# Patient Record
Sex: MALE | Race: WHITE | NOT HISPANIC OR LATINO | URBAN - METROPOLITAN AREA
[De-identification: names, ages, dates, MRNs, and addresses within clinical notes are randomized per-mention and may not be internally consistent; named-entity substitution may affect disease eponyms.]

---

## 2018-01-10 ENCOUNTER — EMERGENCY (EMERGENCY)
Facility: HOSPITAL | Age: 78
LOS: 1 days | Discharge: HOME | End: 2018-01-10
Attending: EMERGENCY MEDICINE | Admitting: EMERGENCY MEDICINE
Payer: MEDICARE

## 2018-01-10 VITALS
SYSTOLIC BLOOD PRESSURE: 153 MMHG | RESPIRATION RATE: 20 BRPM | OXYGEN SATURATION: 97 % | DIASTOLIC BLOOD PRESSURE: 91 MMHG | HEART RATE: 70 BPM

## 2018-01-10 VITALS
RESPIRATION RATE: 20 BRPM | OXYGEN SATURATION: 99 % | WEIGHT: 179.9 LBS | HEART RATE: 87 BPM | DIASTOLIC BLOOD PRESSURE: 99 MMHG | SYSTOLIC BLOOD PRESSURE: 186 MMHG | HEIGHT: 71 IN | TEMPERATURE: 98 F

## 2018-01-10 LAB
ALBUMIN SERPL ELPH-MCNC: 3.7 G/DL — SIGNIFICANT CHANGE UP (ref 3.3–5)
ALP SERPL-CCNC: 125 U/L — HIGH (ref 30–120)
ALT FLD-CCNC: 26 U/L DA — SIGNIFICANT CHANGE UP (ref 10–60)
ANION GAP SERPL CALC-SCNC: 6 MMOL/L — SIGNIFICANT CHANGE UP (ref 5–17)
AST SERPL-CCNC: 26 U/L — SIGNIFICANT CHANGE UP (ref 10–40)
BASOPHILS # BLD AUTO: 0.1 K/UL — SIGNIFICANT CHANGE UP (ref 0–0.2)
BASOPHILS NFR BLD AUTO: 1.1 % — SIGNIFICANT CHANGE UP (ref 0–2)
BILIRUB SERPL-MCNC: 0.4 MG/DL — SIGNIFICANT CHANGE UP (ref 0.2–1.2)
BUN SERPL-MCNC: 16 MG/DL — SIGNIFICANT CHANGE UP (ref 7–23)
CALCIUM SERPL-MCNC: 10.1 MG/DL — SIGNIFICANT CHANGE UP (ref 8.4–10.5)
CHLORIDE SERPL-SCNC: 105 MMOL/L — SIGNIFICANT CHANGE UP (ref 96–108)
CK MB CFR SERPL CALC: 3.1 NG/ML — SIGNIFICANT CHANGE UP (ref 0–3.6)
CK SERPL-CCNC: 132 U/L — SIGNIFICANT CHANGE UP (ref 39–308)
CO2 SERPL-SCNC: 29 MMOL/L — SIGNIFICANT CHANGE UP (ref 22–31)
CREAT SERPL-MCNC: 1.14 MG/DL — SIGNIFICANT CHANGE UP (ref 0.5–1.3)
EOSINOPHIL # BLD AUTO: 0.1 K/UL — SIGNIFICANT CHANGE UP (ref 0–0.5)
EOSINOPHIL NFR BLD AUTO: 1.2 % — SIGNIFICANT CHANGE UP (ref 0–6)
GLUCOSE SERPL-MCNC: 125 MG/DL — HIGH (ref 70–99)
HCT VFR BLD CALC: 42.4 % — SIGNIFICANT CHANGE UP (ref 39–50)
HGB BLD-MCNC: 13.7 G/DL — SIGNIFICANT CHANGE UP (ref 13–17)
LYMPHOCYTES # BLD AUTO: 1.8 K/UL — SIGNIFICANT CHANGE UP (ref 1–3.3)
LYMPHOCYTES # BLD AUTO: 20.3 % — SIGNIFICANT CHANGE UP (ref 13–44)
MCHC RBC-ENTMCNC: 24.6 PG — LOW (ref 27–34)
MCHC RBC-ENTMCNC: 32.4 GM/DL — SIGNIFICANT CHANGE UP (ref 32–36)
MCV RBC AUTO: 76 FL — LOW (ref 80–100)
MONOCYTES # BLD AUTO: 0.6 K/UL — SIGNIFICANT CHANGE UP (ref 0–0.9)
MONOCYTES NFR BLD AUTO: 6.4 % — SIGNIFICANT CHANGE UP (ref 2–14)
NEUTROPHILS # BLD AUTO: 6.4 K/UL — SIGNIFICANT CHANGE UP (ref 1.8–7.4)
NEUTROPHILS NFR BLD AUTO: 70.9 % — SIGNIFICANT CHANGE UP (ref 43–77)
NT-PROBNP SERPL-SCNC: 950 PG/ML — HIGH (ref 0–450)
PLATELET # BLD AUTO: 240 K/UL — SIGNIFICANT CHANGE UP (ref 150–400)
POTASSIUM SERPL-MCNC: 4 MMOL/L — SIGNIFICANT CHANGE UP (ref 3.5–5.3)
POTASSIUM SERPL-SCNC: 4 MMOL/L — SIGNIFICANT CHANGE UP (ref 3.5–5.3)
PROT SERPL-MCNC: 7.5 G/DL — SIGNIFICANT CHANGE UP (ref 6–8.3)
RBC # BLD: 5.58 M/UL — SIGNIFICANT CHANGE UP (ref 4.2–5.8)
RBC # FLD: 14.3 % — SIGNIFICANT CHANGE UP (ref 10.3–14.5)
SODIUM SERPL-SCNC: 140 MMOL/L — SIGNIFICANT CHANGE UP (ref 135–145)
TROPONIN I SERPL-MCNC: 0.03 NG/ML — SIGNIFICANT CHANGE UP (ref 0.02–0.06)
WBC # BLD: 9 K/UL — SIGNIFICANT CHANGE UP (ref 3.8–10.5)
WBC # FLD AUTO: 9 K/UL — SIGNIFICANT CHANGE UP (ref 3.8–10.5)

## 2018-01-10 PROCEDURE — 82553 CREATINE MB FRACTION: CPT

## 2018-01-10 PROCEDURE — 84484 ASSAY OF TROPONIN QUANT: CPT

## 2018-01-10 PROCEDURE — 96374 THER/PROPH/DIAG INJ IV PUSH: CPT

## 2018-01-10 PROCEDURE — 82550 ASSAY OF CK (CPK): CPT

## 2018-01-10 PROCEDURE — 99284 EMERGENCY DEPT VISIT MOD MDM: CPT

## 2018-01-10 PROCEDURE — 71046 X-RAY EXAM CHEST 2 VIEWS: CPT | Mod: 26

## 2018-01-10 PROCEDURE — 71046 X-RAY EXAM CHEST 2 VIEWS: CPT

## 2018-01-10 PROCEDURE — 93010 ELECTROCARDIOGRAM REPORT: CPT

## 2018-01-10 PROCEDURE — 93005 ELECTROCARDIOGRAM TRACING: CPT

## 2018-01-10 PROCEDURE — 83880 ASSAY OF NATRIURETIC PEPTIDE: CPT

## 2018-01-10 PROCEDURE — 99284 EMERGENCY DEPT VISIT MOD MDM: CPT | Mod: 25

## 2018-01-10 PROCEDURE — 80053 COMPREHEN METABOLIC PANEL: CPT

## 2018-01-10 PROCEDURE — 85027 COMPLETE CBC AUTOMATED: CPT

## 2018-01-10 RX ORDER — LABETALOL HCL 100 MG
10 TABLET ORAL ONCE
Qty: 0 | Refills: 0 | Status: COMPLETED | OUTPATIENT
Start: 2018-01-10 | End: 2018-01-10

## 2018-01-10 RX ADMIN — Medication 10 MILLIGRAM(S): at 19:02

## 2018-01-10 NOTE — ED PROVIDER NOTE - OBJECTIVE STATEMENT
76 y/o M with hx of HTN, HLD presents with c/o shortness of breath x 3 days. Pt states that his symptoms are intermittent and feels that way when he is home and sees his  wife's pictures on the walls. Pt states that he feels better now in the ED. Pt states that he went to [facility] MD today, had cxr and given lisinopril 40mg daily for high BP and sent to ED. Pt states that he filled the rx and took one dose today. Pt states that he lives alone at home. Denies SI/HI, cough, fever, chills, n/v, CP, recent travel, sick contacts or other symptoms. 76 y/o M with hx of HTN, HLD presents with c/o shortness of breath x 3 days. Pt states that his symptoms are intermittent and feels that way when he is home and sees his  wife's pictures on the walls. Pt states that he feels better now in the ED. Pt states that he went to [facility] MD today, had cxr and given lisinopril 40mg daily for high BP and sent to ED. Pt states that he filled the rx and took one dose today. Pt states that he lives alone at home. Denies SI/HI, cough, fever, chills, n/v, CP, recent travel, headache, dizziness, vision changes, numbness, tingling, weakness, sick contacts or other symptoms.

## 2018-01-10 NOTE — ED ADULT NURSE REASSESSMENT NOTE - NS ED NURSE REASSESS COMMENT FT1
labetalol 10mg administered secondary to elevated b/p. Placed on continuous cardiac monitoring. NSR 70's noted. See VS
B/P DECREASED AS PER DR CABRERA PT MAY LEAVE WITH D/C INSTRUCTIONS
PT STATES I JUST REALLY MISS MY WIFE SHE TOOK GOOD CARE OF ME IF ISAID I WAS HUNGRY EVEN IF IT WAS TWO AM SHE WOULD GET UP AND COOK ME BALL AND EGGS/ PT GIVEN REASSURANCE/ REEDUCATED CONCERNING MEDICATION COMPLIANCE /PT STATES I WILL GO HOME AND TAKE MY MEDICINE FROM NOW ON I KNOW THAT'S WHAT SHE WOULD OF WANTED ME TO DO

## 2018-01-10 NOTE — ED PROVIDER NOTE - MEDICAL DECISION MAKING DETAILS
76 yo m with SOB x 3 days, intermittent and only when seeing his  wife's pictures at home, no si/hi; lives alone, ex-smoker, lungs cta b, pt took his htn med today; will get labs, one set cards, ekg, cxr, , re-assess

## 2018-01-10 NOTE — ED PROVIDER NOTE - PROGRESS NOTE DETAILS
Pt seen by Vazquez, pt given list of private agencies for HHA, but pt refused and pt does not meet medicare qualifications. Pt examined by ED attending, Dr. Mims who agreed with disposition and plan. Pt seen by Vazquez pt given list of private agencies for HHA, but pt refused and pt does not meet medicare qualifications.  Pt has psychiatrist that he sees in [place]. Will have give psych clinic information if another psychiatrist preferred. Pt seen by , Vazquez pt given list of private agencies for HHA, but pt refused and pt does not meet medicare qualifications.  Pt has psychiatrist that he sees in [place]. Will have give psych clinic information if another psychiatrist preferred. Pt did not want to talk to psychiatrist in ED. Pt re-assessed, feeling better. will d/c home and f/u pmd and psychiatry clinic. Pt with elevated BP in ED, pt is noncompliant with his HTN meds and does not see his PMD regularly. Pt took lisinopril 40mg this am. Will give one dose of IV labetalol in ED since /115 and d/c home, f/u pmd , educated on medication compliance and risk of untreated HTN explained to pt - pt understood and agreed to f/u with pmd and be compliant with meds. Pt seen by , Vazquez, pt given list of private agencies for HHA, but pt refused and pt does not meet medicare qualifications.  Pt has psychiatrist that he sees in [place]. Will have give psych clinic information if another psychiatrist preferred.   I spoke to psychiatrist, Dr. Moser who advised pt does not need any acute psych intervention at this time if no complaints of SI/HI. Pt can f/u with his outpt psychiatrist. Pt also given list of other psych facilities nearby. Pt with elevated BP in ED, pt is noncompliant with his HTN meds and does not see his PMD regularly. Pt took lisinopril 40mg this am. Will give one dose of IV labetalol in ED since /115, monitor and d/c home, f/u pmd , educated on medication compliance and risk of untreated HTN explained to pt - pt understood and agreed to f/u with pmd and be compliant with meds.

## 2018-01-10 NOTE — ED PROVIDER NOTE - CARE PLAN
Principal Discharge DX:	Shortness of breath Principal Discharge DX:	Shortness of breath  Secondary Diagnosis:	Depression Principal Discharge DX:	Shortness of breath  Secondary Diagnosis:	Depression  Secondary Diagnosis:	Hypertension

## 2018-01-10 NOTE — ED ADULT NURSE NOTE - OBJECTIVE STATEMENT
Strong peripheral pulses complains of shortness of breath x 3 days which he thinks is related to panic attacks.  pt states he has been thinking about his  wife a lot and finds himself SOB. No labored breathing or use of accessory muscle noted, O2 Saturation 99. Pt speaks clearly in complete sentences. history of htn and is adherent with his medication (lisinopril).  Plan of care discussed with MD and pt. Pending MD evaluation and orders.

## 2018-01-10 NOTE — ED PROVIDER NOTE - ATTENDING CONTRIBUTION TO CARE
Pt is a 76 yo male who presents to the ED with a cc of SOB.  PMHx of HTN, HLD, depression, anxiety.  Pt reports that he has been experiencing SOB over the last 3 days.  He reports that he experiences these symptoms when he is at home and looks at pictures of his  wife.  Pt is tearful when talking about his wife.   He reports that he has been depressed since her passing but denies SI/HI.  Pt further admits to being noncompliant with his medications.  Denies fever, chills, N/V, CP, cough, abd pain, HA, visual changes, ext numbness/weakness.  He did have his flu vaccine this year.  He was seen at urgent care earlier today and was given prescription for Lisinopril but pt wanted further work up so he came to the ED.  On exam pt resting in bed NAD, PERRL, EOMI, heart RRR, lungs CTA, abd soft NT/ND.  Will check cbc, cmp, BNP, cardiac enzymes, EKG, chest x-ray.  Social wrk consulted to provide lists of home health care agencies as pt lives alone.  Agree with above plan of care.

## 2018-11-02 PROBLEM — E78.5 HYPERLIPIDEMIA, UNSPECIFIED: Chronic | Status: ACTIVE | Noted: 2018-01-10

## 2018-11-02 PROBLEM — I10 ESSENTIAL (PRIMARY) HYPERTENSION: Chronic | Status: ACTIVE | Noted: 2018-01-10

## 2018-11-07 ENCOUNTER — APPOINTMENT (OUTPATIENT)
Dept: RHEUMATOLOGY | Facility: CLINIC | Age: 78
End: 2018-11-07

## 2018-11-07 ENCOUNTER — EMERGENCY (EMERGENCY)
Facility: HOSPITAL | Age: 78
LOS: 1 days | Discharge: HOME | End: 2018-11-07
Attending: EMERGENCY MEDICINE | Admitting: EMERGENCY MEDICINE
Payer: MEDICARE

## 2018-11-07 VITALS
DIASTOLIC BLOOD PRESSURE: 90 MMHG | SYSTOLIC BLOOD PRESSURE: 183 MMHG | TEMPERATURE: 98 F | OXYGEN SATURATION: 98 % | HEART RATE: 65 BPM | RESPIRATION RATE: 16 BRPM

## 2018-11-07 VITALS
DIASTOLIC BLOOD PRESSURE: 124 MMHG | WEIGHT: 175.05 LBS | SYSTOLIC BLOOD PRESSURE: 181 MMHG | TEMPERATURE: 98 F | RESPIRATION RATE: 16 BRPM | HEIGHT: 70 IN | OXYGEN SATURATION: 98 % | HEART RATE: 91 BPM

## 2018-11-07 DIAGNOSIS — F43.21 ADJUSTMENT DISORDER WITH DEPRESSED MOOD: ICD-10-CM

## 2018-11-07 LAB
ALBUMIN SERPL ELPH-MCNC: 3.5 G/DL — SIGNIFICANT CHANGE UP (ref 3.3–5)
ALP SERPL-CCNC: 94 U/L — SIGNIFICANT CHANGE UP (ref 30–120)
ALT FLD-CCNC: 25 U/L DA — SIGNIFICANT CHANGE UP (ref 10–60)
ANION GAP SERPL CALC-SCNC: 8 MMOL/L — SIGNIFICANT CHANGE UP (ref 5–17)
APTT BLD: 25.7 SEC — LOW (ref 27.5–36.3)
AST SERPL-CCNC: 18 U/L — SIGNIFICANT CHANGE UP (ref 10–40)
BASOPHILS # BLD AUTO: 0.04 K/UL — SIGNIFICANT CHANGE UP (ref 0–0.2)
BASOPHILS NFR BLD AUTO: 0.5 % — SIGNIFICANT CHANGE UP (ref 0–2)
BILIRUB SERPL-MCNC: 0.5 MG/DL — SIGNIFICANT CHANGE UP (ref 0.2–1.2)
BUN SERPL-MCNC: 17 MG/DL — SIGNIFICANT CHANGE UP (ref 7–23)
CALCIUM SERPL-MCNC: 9.6 MG/DL — SIGNIFICANT CHANGE UP (ref 8.4–10.5)
CHLORIDE SERPL-SCNC: 105 MMOL/L — SIGNIFICANT CHANGE UP (ref 96–108)
CO2 SERPL-SCNC: 26 MMOL/L — SIGNIFICANT CHANGE UP (ref 22–31)
CREAT SERPL-MCNC: 1.22 MG/DL — SIGNIFICANT CHANGE UP (ref 0.5–1.3)
D DIMER BLD IA.RAPID-MCNC: 157 NG/ML DDU — SIGNIFICANT CHANGE UP
EOSINOPHIL # BLD AUTO: 0.09 K/UL — SIGNIFICANT CHANGE UP (ref 0–0.5)
EOSINOPHIL NFR BLD AUTO: 1 % — SIGNIFICANT CHANGE UP (ref 0–6)
GLUCOSE SERPL-MCNC: 147 MG/DL — HIGH (ref 70–99)
HCT VFR BLD CALC: 37.8 % — LOW (ref 39–50)
HGB BLD-MCNC: 12.2 G/DL — LOW (ref 13–17)
IMM GRANULOCYTES NFR BLD AUTO: 0.3 % — SIGNIFICANT CHANGE UP (ref 0–1.5)
INR BLD: 1.04 RATIO — SIGNIFICANT CHANGE UP (ref 0.88–1.16)
LYMPHOCYTES # BLD AUTO: 1.45 K/UL — SIGNIFICANT CHANGE UP (ref 1–3.3)
LYMPHOCYTES # BLD AUTO: 16.5 % — SIGNIFICANT CHANGE UP (ref 13–44)
MCHC RBC-ENTMCNC: 24.7 PG — LOW (ref 27–34)
MCHC RBC-ENTMCNC: 32.3 GM/DL — SIGNIFICANT CHANGE UP (ref 32–36)
MCV RBC AUTO: 76.7 FL — LOW (ref 80–100)
MONOCYTES # BLD AUTO: 0.46 K/UL — SIGNIFICANT CHANGE UP (ref 0–0.9)
MONOCYTES NFR BLD AUTO: 5.2 % — SIGNIFICANT CHANGE UP (ref 2–14)
NEUTROPHILS # BLD AUTO: 6.72 K/UL — SIGNIFICANT CHANGE UP (ref 1.8–7.4)
NEUTROPHILS NFR BLD AUTO: 76.5 % — SIGNIFICANT CHANGE UP (ref 43–77)
NT-PROBNP SERPL-SCNC: 260 PG/ML — SIGNIFICANT CHANGE UP (ref 0–450)
PLATELET # BLD AUTO: 205 K/UL — SIGNIFICANT CHANGE UP (ref 150–400)
POTASSIUM SERPL-MCNC: 3 MMOL/L — LOW (ref 3.5–5.3)
POTASSIUM SERPL-SCNC: 3 MMOL/L — LOW (ref 3.5–5.3)
PROT SERPL-MCNC: 6.6 G/DL — SIGNIFICANT CHANGE UP (ref 6–8.3)
PROTHROM AB SERPL-ACNC: 11.4 SEC — SIGNIFICANT CHANGE UP (ref 10–12.9)
RBC # BLD: 4.93 M/UL — SIGNIFICANT CHANGE UP (ref 4.2–5.8)
RBC # FLD: 15.9 % — HIGH (ref 10.3–14.5)
SODIUM SERPL-SCNC: 139 MMOL/L — SIGNIFICANT CHANGE UP (ref 135–145)
TROPONIN I SERPL-MCNC: 0.02 NG/ML — LOW (ref 0.02–0.06)
WBC # BLD: 8.79 K/UL — SIGNIFICANT CHANGE UP (ref 3.8–10.5)
WBC # FLD AUTO: 8.79 K/UL — SIGNIFICANT CHANGE UP (ref 3.8–10.5)

## 2018-11-07 PROCEDURE — 71046 X-RAY EXAM CHEST 2 VIEWS: CPT | Mod: 26

## 2018-11-07 PROCEDURE — 93010 ELECTROCARDIOGRAM REPORT: CPT

## 2018-11-07 PROCEDURE — 71046 X-RAY EXAM CHEST 2 VIEWS: CPT

## 2018-11-07 PROCEDURE — 80053 COMPREHEN METABOLIC PANEL: CPT

## 2018-11-07 PROCEDURE — 84484 ASSAY OF TROPONIN QUANT: CPT

## 2018-11-07 PROCEDURE — 85379 FIBRIN DEGRADATION QUANT: CPT

## 2018-11-07 PROCEDURE — 99284 EMERGENCY DEPT VISIT MOD MDM: CPT | Mod: 25

## 2018-11-07 PROCEDURE — 85730 THROMBOPLASTIN TIME PARTIAL: CPT

## 2018-11-07 PROCEDURE — 85027 COMPLETE CBC AUTOMATED: CPT

## 2018-11-07 PROCEDURE — 36415 COLL VENOUS BLD VENIPUNCTURE: CPT

## 2018-11-07 PROCEDURE — 99284 EMERGENCY DEPT VISIT MOD MDM: CPT

## 2018-11-07 PROCEDURE — 93005 ELECTROCARDIOGRAM TRACING: CPT

## 2018-11-07 PROCEDURE — 85610 PROTHROMBIN TIME: CPT

## 2018-11-07 PROCEDURE — 90792 PSYCH DIAG EVAL W/MED SRVCS: CPT | Mod: GT

## 2018-11-07 PROCEDURE — 83880 ASSAY OF NATRIURETIC PEPTIDE: CPT

## 2018-11-07 RX ORDER — ASPIRIN/CALCIUM CARB/MAGNESIUM 324 MG
1 TABLET ORAL
Qty: 0 | Refills: 0 | COMMUNITY

## 2018-11-07 RX ORDER — TIOTROPIUM BROMIDE 18 UG/1
1 CAPSULE ORAL; RESPIRATORY (INHALATION)
Qty: 0 | Refills: 0 | COMMUNITY

## 2018-11-07 RX ORDER — CHOLECALCIFEROL (VITAMIN D3) 125 MCG
1 CAPSULE ORAL
Qty: 0 | Refills: 0 | COMMUNITY

## 2018-11-07 RX ORDER — METOPROLOL TARTRATE 50 MG
50 TABLET ORAL ONCE
Qty: 0 | Refills: 0 | Status: COMPLETED | OUTPATIENT
Start: 2018-11-07 | End: 2018-11-07

## 2018-11-07 RX ADMIN — Medication 50 MILLIGRAM(S): at 14:45

## 2018-11-07 RX ADMIN — Medication 1 MILLIGRAM(S): at 14:45

## 2018-11-07 RX ADMIN — Medication 0.1 MILLIGRAM(S): at 17:57

## 2018-11-07 NOTE — ED PROVIDER NOTE - PROGRESS NOTE DETAILS
Pt examined by ED attending, Dr. Orr who agreed with disposition and plan. Spoke to psychiatrist at telepsych, discussed case, will consult pt soon.  , Vazquez also paged to speak to pt. Spoke to psychiatrist, pt is cleared for discharge, pt has appt with outpt psych next week. Spoke to son, Austin, discussed case and results, he is out of town for work, will take father for outpt psych appt next Tuesday and f/u pmd.

## 2018-11-07 NOTE — ED ADULT NURSE REASSESSMENT NOTE - NS ED NURSE REASSESS COMMENT FT1
Patient agreeable to speak to psychiatrist via tele psych.
Patient's blood pressure became elevated, patient reminiscing about his dead wife. Offered to call  of therapist patient declines. States he has spoken to them before. Priscilla Moser aware. Patient also states he is not sure if he took his blood pressure medication today. Will medicate as per orders.
Patient's blood pressure remains elevated.  Clonidine 0.1 mg. given. Resting in bed without distress.
Contacted   Ever on patient's behalf. Awaiting consult. Patient calmer at this time.

## 2018-11-07 NOTE — ED BEHAVIORAL HEALTH ASSESSMENT NOTE - SUMMARY
77 year-old  male, living alone,  2016, with recent psychiatric history grief 2016 (saw psychiatrist for 1 year), with no other prior psychiatric history, with no prior / current suicidal ideation, with no prior suicide attempt, with no prior in-patient hospitalization, no legal history, self-referred for shortness of breathe. Psychiatry consulted for depression in the setting of grief.    Patient symptoms indicative of Grief reaction with prolonged bereavement. Patient has no hopelessness or suicidal ideation. Patient has no prior suicide attempt. Patient is not manic or psychotic. Patient symptoms not indicated imminent risk for harm to self; not warranting involuntary in-patient hospitalization. Patient to follow-up with

## 2018-11-07 NOTE — ED BEHAVIORAL HEALTH ASSESSMENT NOTE - OTHER PAST PSYCHIATRIC HISTORY (INCLUDE DETAILS REGARDING ONSET, COURSE OF ILLNESS, INPATIENT/OUTPATIENT TREATMENT)
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

## 2018-11-07 NOTE — ED BEHAVIORAL HEALTH ASSESSMENT NOTE - CASE SUMMARY
Per chart review, pre-hospital/ED staff, and Son: the patient is a 76 yo, domiciled alone in a house, retired,  (about 3 years), , Male, Non-caregiver, no formal psychiatric diagnosis, no hx of hospitalizations, hx of making passive suicidal statement to the effect of “if nobody wants to be around here I guess I’ll just die,” no hx of of self injury or suicide attempts, no hx of violence, no hx of arrests, no active substance use, remote hx of alcohol use 25+years ago, no hx of DTS/withdrawal, PMH of enlarged prostate and high blood pressure, BIB self, referred by self, presenting with shortness of breath but became tearful speaking about  wife ( a few years), seeking evaluation in setting of being  a few years ago. Psychiatry consulted for depression in the setting of grief.    Patient symptoms indicative of Grief reaction with prolonged bereavement. Patient initially tearful in the ER however in a better mood later, cracking jokes about having to go to the "QQ doctor" and him needing a girlfriend. Patient has no hopelessness or suicidal ideation. Patient has no prior suicide attempt. Patient is not manic or psychotic. Pt declined voluntary hospitalization and did not meet criteria for involuntary commitment given not an imminent risk to self or others. He will follow up with dr Dickson on  at 13:15pm at the [facility] in [place]. Family was contacted, agreed to disposition did not voice safety concerns.

## 2018-11-07 NOTE — ED PROVIDER NOTE - ATTENDING CONTRIBUTION TO CARE
I, Dr Orr, have personally performed a face to face diagnostic evaluation on this patient with the PA/NP. I have reviewed the PA/NP's note and agree with the history, Physical exam and plan of care, As per history 78 y/o M with hx of HTN, HLD presents with c/o shortness of breath x 1 week. Pt states that his symptoms are intermittent and feels worse when he thinks about his  wife. Pt states that he took his medications this morning. It is non-exertional. Denies chest pain, headache, dizziness, cough, fever, body aches, n/v, vision changes, numbness, tingling, focal weakness or other symptoms. Pt denies SI/HI, drug abuse. Pt was seen for similar symptoms here few months ago.  Pt used to see a psychiatrist but not anymore and is not any psychiatric meds. States that his wife passed away 2 years ago and is still very depressed. Patient still feeling very depress about the death of wife Psych consult noted will discharge patient home fallow up with PMD.

## 2018-11-07 NOTE — ED PROVIDER NOTE - MEDICAL DECISION MAKING DETAILS
76 y/o M with hx of HTN, HLD c/o SOB x 1 week, when he thinks about his  wife, no fever/cp, no si/hi, will get labs, trop, ddimer, cxr, re-assess 78 y/o M with hx of HTN, HLD c/o SOB x 1 week, when he thinks about his  wife, no fever/cp, no si/hi, will get labs, trop, ddimer, cxr, Psych and SW consults, re-assess

## 2018-11-07 NOTE — ED BEHAVIORAL HEALTH ASSESSMENT NOTE - HPI (INCLUDE ILLNESS QUALITY, SEVERITY, DURATION, TIMING, CONTEXT, MODIFYING FACTORS, ASSOCIATED SIGNS AND SYMPTOMS)
77 year-old  male, living alone,  2016, with recent psychiatric history grief 2016 (saw psychiatrist for 1 year), with no other prior psychiatric history, with no prior / current suicidal ideation, with no prior suicide attempt, with no prior in-patient hospitalization, no legal history, self-referred for shortness of breathe. Psychiatry consulted for depression in the setting of grief.    Patient presenting euthymic, reports persistent depressed mood secondary to grief. Reports being  60 years prior wife passing away. Reports experiencing difficulty sleepin-5 hours nightly. Denies disturbances in appetite. Denies other depressive symptoms including hopelessness, poor concentration, guilt, anergia, anhedonia. Denies manic symptoms including elevated mood, increased irritability, mood lability, distractibility, grandiosity, pressured speech, increase in goal-directed activity, or decreased need for sleep. Denies psychotic symptoms including paranoia, ideas of reference, thought insertion/broadcasting, or auditory/visual/olfactory/tactile/gustatory hallucinations. Denies anxiety, excessive worrying. Denies PTSD. Patient is independent. Reports wanting a girlfriend, jokingly. Reports positive therapeutic relationships and strong social supports. Reports future orientation, with motivation to continue outpatient treatment. Engaged in safety planning. 77 year-old  male, living alone,  2016, with recent psychiatric history grief 2016 (saw psychiatrist for 1 year), with no other prior psychiatric history, with no prior / current suicidal ideation, with no prior suicide attempt, with no prior in-patient hospitalization, no legal history, self-referred for shortness of breathe. Psychiatry consulted for depression in the setting of grief.    Patient presenting euthymic, reports persistent depressed mood secondary to grief. Reports being  60 years prior wife passing away. Reports experiencing difficulty sleepin-5 hours nightly. Denies disturbances in appetite. Denies other depressive symptoms including hopelessness, poor concentration, guilt, anergia, anhedonia. Denies manic symptoms including elevated mood, increased irritability, mood lability, distractibility, grandiosity, pressured speech, increase in goal-directed activity, or decreased need for sleep. Denies psychotic symptoms including paranoia, ideas of reference, thought insertion/broadcasting, or auditory/visual/olfactory/tactile/gustatory hallucinations. Denies anxiety, excessive worrying. Denies PTSD. Patient is independent. Reports wanting a girlfriend, jokingly. Reports positive therapeutic relationships and strong social supports. Reports future orientation, with motivation to continue outpatient treatment. Engaged in safety planning.    Son provides collateral, corroborating history. Denies suicide attempt. Reports history of passive suicidal ideation but states it was attention seeking secondary to him feeling lonely since wife passing. Patient otherwise is independent. Denies safety concerns. 77 year-old  male, living alone,  2016, with recent psychiatric history grief 2016 (saw psychiatrist for 1 year), with no other prior psychiatric history, with no prior / current suicidal ideation, with no prior suicide attempt, with no prior in-patient hospitalization, no legal history, self-referred for shortness of breathe. Psychiatry consulted for depression in the setting of grief.    Patient presenting euthymic, reports persistent depressed mood secondary to grief. Reports being  60 years prior wife passing away. Reports experiencing difficulty sleepin-5 hours nightly. Denies disturbances in appetite. Denies other depressive symptoms including hopelessness, poor concentration, guilt, anergia, anhedonia. Denies manic symptoms including elevated mood, increased irritability, mood lability, distractibility, grandiosity, pressured speech, increase in goal-directed activity, or decreased need for sleep. Denies psychotic symptoms including paranoia, ideas of reference, thought insertion/broadcasting, or auditory/visual/olfactory/tactile/gustatory hallucinations. Denies anxiety, excessive worrying. Denies PTSD. Patient is independent. Reports wanting a girlfriend, jokingly. Reports positive therapeutic relationships and strong social supports. Reports future orientation, with motivation to continue outpatient treatment. Engaged in safety planning.    Son provides collateral, corroborating history. Denies suicide attempt. Reports history of passive suicidal ideation but states it was attention seeking secondary to him feeling lonely since wife passing. Patient otherwise is independent. Denies safety concerns.    Austin Darnell, [phone], Son, frequent contact, most recent contact was by phone earlier today, knowledgeable about patient hx and presenting issues, no acute safety concerns, will be taking patient to outpatient Psych appt next week and encouraging him to be in outpatient treatment. Per Son the HPI really starts a few years ago after patient’s wife . At baseline patient lives alone in a house, has 4 adult children, 2 of whom he is close with and speaks to regularly (1 lives locally and 1 lives in [place]), spends time socializing with a neighbor, and is retired. His baseline sx include feeling sad since his Wife  about 3 years ago and having difficulty falling asleep at night since that time, but no other mood/psychotic/anxiety sx, substance use, or thoughts of harming himself/others. Patient is not in treatment, and in the past saw a Psychiatrist 3 times  about 1 year ago due to this sadness and grief that he has felt since losing his Wife. Son states that about 1 year ago he took an anti-depressant briefly, but Son can’t recall the name of the medication. Son states that since patient’s Wife , patient has cried frequently, speaks about missing his Wife and has had difficulty falling asleep at night. Son reports that he has encouraged patient to sell home and either move in with one of his Son’s or move to a smaller home, patient declines and states that his Wife would not have wanted him to move out of this home that they shared. Patient has at times asked Son to move into the home with him but Son explains that he can’t, and patient has on a few occasions over the past few years said something to the effect of “if nobody wants to be around here I guess I’ll just die.” Son states that patient has never expressed any plan or intent, has no hx of self injury or suicide attempts, and states that when patient has made this statement he believes it has been in an attention seeking way to get a rise out of Son and Son has not been concerned that patient is truly having a thought about wanting to hurt himself. Patient has had no change to appetite or ADLS or caring for himself. No other mood/psychotic/anxiety sx, substance use or thoughts of harming others. Son denies safety concerns and states that he will be driving patient to an appointment he scheduled for patient with a Psychiatrist Dr. Dickson on  at 1:15 at [address] [facility].

## 2018-11-07 NOTE — ED ADULT NURSE NOTE - CHPI ED NUR SYMPTOMS NEG
no headache/no hemoptysis/no fever/no wheezing/no chills/no cough/no body aches/no diaphoresis/no chest pain

## 2018-11-07 NOTE — ED PROVIDER NOTE - OBJECTIVE STATEMENT
78 y/o M with hx of HTN, HLD presents with c/o shortness of breath x 1 week. Pt states that his symptoms are intermittent and feels worse when he thinks about his  wife. Pt states that he took his medications this morning. It is non-exertional. Denies chest pain, headache, dizziness, cough, fever, body aches, n/v, vision changes, numbness, tingling, focal weakness or other symptoms. Pt denies SI/HI, does not want to speak to a psychiatrist. 78 y/o M with hx of HTN, HLD presents with c/o shortness of breath x 1 week. Pt states that his symptoms are intermittent and feels worse when he thinks about his  wife. Pt states that he took his medications this morning. It is non-exertional. Denies chest pain, headache, dizziness, cough, fever, body aches, n/v, vision changes, numbness, tingling, focal weakness or other symptoms. Pt denies SI/HI, drug abuse. Pt was seen for similar symptoms here few months ago.  Pt used to see a psychiatrist but not anymore and is not any psychiatric meds. States that his wife passed away 2 years ago and is still very depressed.

## 2018-11-07 NOTE — ED BEHAVIORAL HEALTH ASSESSMENT NOTE - DESCRIPTION
Patient was calm and cooperative in the ED and did not exhibit any aggression. Patient did not require any PRN medications or any physical restraints.     Vital Signs Last 24 Hrs  T(C): 36.7 (07 Nov 2018 12:30), Max: 36.7 (07 Nov 2018 12:30)  T(F): 98 (07 Nov 2018 12:30), Max: 98 (07 Nov 2018 12:30)  HR: 86 (07 Nov 2018 14:42) (75 - 91)  BP: 197/101 (07 Nov 2018 14:42) (151/98 - 197/101)  BP(mean): --  RR: 20 (07 Nov 2018 14:42) (16 - 20)  SpO2: 99% (07 Nov 2018 14:42) (98% - 99%) HTN, HLD As per HPI Patient was calm and cooperative in the ED and did not exhibit any aggression. Patient did not require any PRN medications or any physical restraints.   Patient in ED for ~3hrs prior to Telepsych consult. Per ED PA (ED RN unavailable) and ED documentation, patient presented with complaint of shortness of breath for 1 week and had high blood in ED. Patient was given Patient became tearful when speaking about his wife who is  (passed away a few years ago), stated that he is still depressed about this, and PA states that when speaking about Wife patient’s blood pressure went up. Patient was given Lorazepam 1mg tab oral voluntarily, and Metoprolol succinate 50mg ER tablet 1 tablet. MICHAEL SEPULVEDA met with patient in ED and offered grief group counseling resources, but patient declined. Patient was given a blanket in ED and watched TV. Patient has been cooperative and interactive with staff.     Vital Signs Last 24 Hrs  T(C): 36.7 (2018 12:30), Max: 36.7 (2018 12:30)  T(F): 98 (2018 12:30), Max: 98 (2018 12:30)  HR: 86 (2018 14:42) (75 - 91)  BP: 197/101 (2018 14:42) (151/98 - 197/101)  BP(mean): --  RR: 20 (2018 14:42) (16 - 20)  SpO2: 99% (2018 14:42) (98% - 99%)

## 2018-11-07 NOTE — ED PROVIDER NOTE - CARE PLAN
Principal Discharge DX:	Shortness of breath Principal Discharge DX:	Depression  Secondary Diagnosis:	High blood pressure

## 2018-11-07 NOTE — ED PROVIDER NOTE - CHPI ED SYMPTOMS NEG
no diaphoresis/no wheezing/no hemoptysis/no body aches/no chest pain/no cough/no edema/no fever/no chills/no headache

## 2018-11-07 NOTE — ED BEHAVIORAL HEALTH ASSESSMENT NOTE - RISK ASSESSMENT
Low risk - risk factors including bereavement  protective factors including no suicidal ideation/intent/plan, no suicide attempt, no manic or psychotic symptoms, positive relationships, supports, future orientation, self sufficient

## 2018-11-07 NOTE — ED PROVIDER NOTE - CONDUCTED A DETAILED DISCUSSION WITH PATIENT AND/OR GUARDIAN REGARDING, MDM
radiology results/lab results radiology results/return to ED if symptoms worsen, persist or questions arise/need for outpatient follow-up/lab results

## 2018-11-11 ENCOUNTER — EMERGENCY (EMERGENCY)
Facility: HOSPITAL | Age: 78
LOS: 1 days | Discharge: HOME | End: 2018-11-11
Attending: EMERGENCY MEDICINE | Admitting: EMERGENCY MEDICINE
Payer: MEDICARE

## 2018-11-11 VITALS
SYSTOLIC BLOOD PRESSURE: 166 MMHG | OXYGEN SATURATION: 98 % | HEIGHT: 70 IN | RESPIRATION RATE: 24 BRPM | DIASTOLIC BLOOD PRESSURE: 120 MMHG | TEMPERATURE: 98 F | WEIGHT: 179.9 LBS | HEART RATE: 85 BPM

## 2018-11-11 VITALS
DIASTOLIC BLOOD PRESSURE: 90 MMHG | RESPIRATION RATE: 17 BRPM | TEMPERATURE: 98 F | HEART RATE: 66 BPM | SYSTOLIC BLOOD PRESSURE: 180 MMHG

## 2018-11-11 DIAGNOSIS — R69 ILLNESS, UNSPECIFIED: ICD-10-CM

## 2018-11-11 LAB
ALBUMIN SERPL ELPH-MCNC: 3.5 G/DL — SIGNIFICANT CHANGE UP (ref 3.3–5)
ALP SERPL-CCNC: 96 U/L — SIGNIFICANT CHANGE UP (ref 30–120)
ALT FLD-CCNC: 30 U/L DA — SIGNIFICANT CHANGE UP (ref 10–60)
ANION GAP SERPL CALC-SCNC: 12 MMOL/L — SIGNIFICANT CHANGE UP (ref 5–17)
APAP SERPL-MCNC: <1 UG/ML — LOW (ref 10–30)
APTT BLD: 29.8 SEC — SIGNIFICANT CHANGE UP (ref 27.5–36.3)
AST SERPL-CCNC: 24 U/L — SIGNIFICANT CHANGE UP (ref 10–40)
BASOPHILS # BLD AUTO: 0.04 K/UL — SIGNIFICANT CHANGE UP (ref 0–0.2)
BASOPHILS NFR BLD AUTO: 0.6 % — SIGNIFICANT CHANGE UP (ref 0–2)
BILIRUB SERPL-MCNC: 0.6 MG/DL — SIGNIFICANT CHANGE UP (ref 0.2–1.2)
BUN SERPL-MCNC: 11 MG/DL — SIGNIFICANT CHANGE UP (ref 7–23)
CALCIUM SERPL-MCNC: 10.5 MG/DL — SIGNIFICANT CHANGE UP (ref 8.4–10.5)
CHLORIDE SERPL-SCNC: 105 MMOL/L — SIGNIFICANT CHANGE UP (ref 96–108)
CO2 SERPL-SCNC: 26 MMOL/L — SIGNIFICANT CHANGE UP (ref 22–31)
CREAT SERPL-MCNC: 1.26 MG/DL — SIGNIFICANT CHANGE UP (ref 0.5–1.3)
D DIMER BLD IA.RAPID-MCNC: 376 NG/ML DDU — HIGH
EOSINOPHIL # BLD AUTO: 0.12 K/UL — SIGNIFICANT CHANGE UP (ref 0–0.5)
EOSINOPHIL NFR BLD AUTO: 1.7 % — SIGNIFICANT CHANGE UP (ref 0–6)
ETHANOL SERPL-MCNC: <3 MG/DL — SIGNIFICANT CHANGE UP (ref 0–3)
GLUCOSE SERPL-MCNC: 92 MG/DL — SIGNIFICANT CHANGE UP (ref 70–99)
HCT VFR BLD CALC: 40.5 % — SIGNIFICANT CHANGE UP (ref 39–50)
HGB BLD-MCNC: 13.1 G/DL — SIGNIFICANT CHANGE UP (ref 13–17)
IMM GRANULOCYTES NFR BLD AUTO: 0.3 % — SIGNIFICANT CHANGE UP (ref 0–1.5)
INR BLD: 1.02 RATIO — SIGNIFICANT CHANGE UP (ref 0.88–1.16)
LYMPHOCYTES # BLD AUTO: 1.41 K/UL — SIGNIFICANT CHANGE UP (ref 1–3.3)
LYMPHOCYTES # BLD AUTO: 19.6 % — SIGNIFICANT CHANGE UP (ref 13–44)
MCHC RBC-ENTMCNC: 25 PG — LOW (ref 27–34)
MCHC RBC-ENTMCNC: 32.3 GM/DL — SIGNIFICANT CHANGE UP (ref 32–36)
MCV RBC AUTO: 77.3 FL — LOW (ref 80–100)
MONOCYTES # BLD AUTO: 0.43 K/UL — SIGNIFICANT CHANGE UP (ref 0–0.9)
MONOCYTES NFR BLD AUTO: 6 % — SIGNIFICANT CHANGE UP (ref 2–14)
NEUTROPHILS # BLD AUTO: 5.18 K/UL — SIGNIFICANT CHANGE UP (ref 1.8–7.4)
NEUTROPHILS NFR BLD AUTO: 71.8 % — SIGNIFICANT CHANGE UP (ref 43–77)
NRBC # BLD: 0 /100 WBCS — SIGNIFICANT CHANGE UP (ref 0–0)
PLATELET # BLD AUTO: 256 K/UL — SIGNIFICANT CHANGE UP (ref 150–400)
POTASSIUM SERPL-MCNC: 3.5 MMOL/L — SIGNIFICANT CHANGE UP (ref 3.5–5.3)
POTASSIUM SERPL-SCNC: 3.5 MMOL/L — SIGNIFICANT CHANGE UP (ref 3.5–5.3)
PROT SERPL-MCNC: 7 G/DL — SIGNIFICANT CHANGE UP (ref 6–8.3)
PROTHROM AB SERPL-ACNC: 11.1 SEC — SIGNIFICANT CHANGE UP (ref 10–12.9)
RBC # BLD: 5.24 M/UL — SIGNIFICANT CHANGE UP (ref 4.2–5.8)
RBC # FLD: 15.9 % — HIGH (ref 10.3–14.5)
SALICYLATES SERPL-MCNC: <0.2 MG/DL — LOW (ref 2.8–20)
SODIUM SERPL-SCNC: 143 MMOL/L — SIGNIFICANT CHANGE UP (ref 135–145)
TROPONIN I SERPL-MCNC: 0.01 NG/ML — LOW (ref 0.02–0.06)
WBC # BLD: 7.2 K/UL — SIGNIFICANT CHANGE UP (ref 3.8–10.5)
WBC # FLD AUTO: 7.2 K/UL — SIGNIFICANT CHANGE UP (ref 3.8–10.5)

## 2018-11-11 PROCEDURE — 71275 CT ANGIOGRAPHY CHEST: CPT

## 2018-11-11 PROCEDURE — 84484 ASSAY OF TROPONIN QUANT: CPT

## 2018-11-11 PROCEDURE — 85610 PROTHROMBIN TIME: CPT

## 2018-11-11 PROCEDURE — 71045 X-RAY EXAM CHEST 1 VIEW: CPT | Mod: 26

## 2018-11-11 PROCEDURE — 80053 COMPREHEN METABOLIC PANEL: CPT

## 2018-11-11 PROCEDURE — 36415 COLL VENOUS BLD VENIPUNCTURE: CPT

## 2018-11-11 PROCEDURE — 71275 CT ANGIOGRAPHY CHEST: CPT | Mod: 26

## 2018-11-11 PROCEDURE — 90792 PSYCH DIAG EVAL W/MED SRVCS: CPT | Mod: GT

## 2018-11-11 PROCEDURE — 71045 X-RAY EXAM CHEST 1 VIEW: CPT

## 2018-11-11 PROCEDURE — 99284 EMERGENCY DEPT VISIT MOD MDM: CPT | Mod: 25

## 2018-11-11 PROCEDURE — 85379 FIBRIN DEGRADATION QUANT: CPT

## 2018-11-11 PROCEDURE — 80307 DRUG TEST PRSMV CHEM ANLYZR: CPT

## 2018-11-11 PROCEDURE — 85730 THROMBOPLASTIN TIME PARTIAL: CPT

## 2018-11-11 PROCEDURE — 93005 ELECTROCARDIOGRAM TRACING: CPT

## 2018-11-11 PROCEDURE — 93010 ELECTROCARDIOGRAM REPORT: CPT

## 2018-11-11 PROCEDURE — 99284 EMERGENCY DEPT VISIT MOD MDM: CPT

## 2018-11-11 PROCEDURE — 85027 COMPLETE CBC AUTOMATED: CPT

## 2018-11-11 RX ORDER — METOPROLOL TARTRATE 50 MG
50 TABLET ORAL ONCE
Qty: 0 | Refills: 0 | Status: COMPLETED | OUTPATIENT
Start: 2018-11-11 | End: 2018-11-11

## 2018-11-11 RX ORDER — ASPIRIN/CALCIUM CARB/MAGNESIUM 324 MG
325 TABLET ORAL ONCE
Qty: 0 | Refills: 0 | Status: COMPLETED | OUTPATIENT
Start: 2018-11-11 | End: 2018-11-11

## 2018-11-11 RX ADMIN — Medication 325 MILLIGRAM(S): at 08:47

## 2018-11-11 RX ADMIN — Medication 50 MILLIGRAM(S): at 08:45

## 2018-11-11 NOTE — ED BEHAVIORAL HEALTH ASSESSMENT NOTE - OTHER
euthymic; tearful at times when discussing wife but quickly composed self and was joking with evaluator Kasia Casas grief, recent episode of sadness/anxiety related to grief n/a

## 2018-11-11 NOTE — ED BEHAVIORAL HEALTH ASSESSMENT NOTE - DESCRIPTION
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  Patient in ED for approximately 3 hours prior to telepsychiatry consult. He originally came to the ED as a walk in, unaccompanied, with complaints of shortness of breath. Patient was medically cleared and given home medications (Asprin 325 MG and Lopressor 50 MG) voluntarily. He told ED staff that he has been increasingly lonely, depressed and anxious since his wife passed away three years ago. He was tearful when discussing his wife and children, stating that his son was unable to come visit with him today. No current self-injurious behavior or violence. Patient was seen by telepsychiatry in [facility] [place] on 11/7 for similar presentation as today and did not require psychiatric admission. His mood, speech and affect all appear normal. He was given breakfast and is cooperating with staff/ friendly. No involuntary medications or restraints required. No visitors during ED stay.     Vital Signs Last 24 Hrs  T(C): 36.6 (11 Nov 2018 11:45), Max: 36.8 (11 Nov 2018 08:06)  T(F): 97.8 (11 Nov 2018 11:45), Max: 98.2 (11 Nov 2018 08:06)  HR: 66 (11 Nov 2018 11:45) (64 - 85)  BP: 180/90 (11 Nov 2018 11:45) (166/120 - 190/100)  BP(mean): --  RR: 17 (11 Nov 2018 11:45) (17 - 24)  SpO2: 99% (11 Nov 2018 11:03) (95% - 99%) HTN, HLD

## 2018-11-11 NOTE — ED BEHAVIORAL HEALTH ASSESSMENT NOTE - RELATEDNESS
Pt back from CT. Pt states she cannot feel her right foot.    Upon assessment, pt able to move leg/foot. Wiggle toes. CMS intact. Pt able to feel my fingers on top of her foot.  While in room, pt states the feeling has returned.   Good

## 2018-11-11 NOTE — ED BEHAVIORAL HEALTH ASSESSMENT NOTE - HPI (INCLUDE ILLNESS QUALITY, SEVERITY, DURATION, TIMING, CONTEXT, MODIFYING FACTORS, ASSOCIATED SIGNS AND SYMPTOMS)
Patient is a 77 year-old  male, retired, living alone,  since 2016, with recent psychiatric history of grief since 2016 (saw psychiatrist for 1 year), with no other prior psychiatric history, with no prior / current suicidal ideation, with no prior suicide attempt, with no prior in-patient hospitalization, no legal history, no history violence/aggression, no substance abuse history. PMH HLD, HTN. Self-referred for shortness of breath. Psychiatry consulted for depression in the setting of grief.    Patient presenting euthymic, reporting periodic depressed mood occurring 1x per week secondary to grief. He denies currently feeling depressed. Patient reports being  60 years prior wife passing away and "she did everything for me." He stated over the last month when he thinks about her he becomes sad and "I get short of breath." He is close with his son who he usually calls but son was unable to come see him today so patient self presented to ER. Patient has upcoming psychiatrist appointment . He believes he needs to start speaking with his son about moving to a 55+ living community because he wants to meet people and stay busy.     Patient denies feeling depressed daily. He stated these episodes occur about 1x per week since death of wife. He endorses over the last month having episodes of shortness of breath 1x per week when he thinks about his wife and having trouble falling asleep, sleepin-5 hours nightly. Patient denies any hallucinations, does not report any delusional thought content, denies thought insertion/withdrawal, denies referential thought processes & is not paranoid on interview. Pt is linear, logical, organized and well related. Patient does not report nor exhibit any signs of maryanne, including irritable or elevated mood, grandiosity, pressured speech, risk-taking behaviors, increase in productivity or agitation. Patient denies any other depressive symptoms including anhedonia, changes in energy/concentration/appetite, preoccupation with death or feelings of guilt. Patient adamantly denies SI, intent or plan; denies any HI, violent thoughts.     Patient is independent. Reports wanting a girlfriend, jokingly. Reports positive therapeutic relationships and strong social supports specifically his son who lives near by and his neighbor Madi. He is future orientated, with motivation to start outpatient treatment and move to a 55+ community. He engaged in safety planning. Patient was AOx4, MSE w/n normal limits. However, patient did not recall speaking with tele psychiatrist 18.     Collateral obtained from patient’s son, Austin Darnell [phone], who confirms patient’s current presentation and past psychiatric history. Patient’s son states that patient was seen in [facility] ED on 18 for similar presentation as today (SOB and depression/ anxiety) not requiring psychiatric admission. Today, patient called son stating that he was having difficulty breathing again and wanted son to come over for the day. When son said he could not due to work patient told him he was going to go to the ED to “speak with someone.” Patient’s son notes that patient has been chronically lonely since his wife  3 years ago. He wants to move in with his son but son states that it’s not feasible at this time. Patient has been visited by various family/ friends and son is looking into connecting him with a senior center. Patient’s son reports that patient does not complain of any psychiatric symptoms when he is with others. His main complaints when he is alone are shortness of breath, rapid heartbeat, shaking hands, difficulty sleeping. No other observed symptoms including SI, HI, violence, maryanne, psychosis or substance abuse. Son states that at baseline patient is dependent on children, somewhat needy and becomes easily anxious when family are unable to visit with him multiple times throughout the day. Patient has a reported history of depression in the context of bereavement starting three years ago, no history of suicide attempts or psychiatric hospitalizations. No family history of suicide. No access to weapons or guns. Collateral believes patient is at baseline, is not an acute safety risk and does not require inpatient psychiatric admission at this time. Patient’s son will be taking patient to intake with private psychiatrist, Dr. Dickson, on 18 at 1:15 pm. Son denies safety concerns and states that he will be driving patient to his appointment.

## 2018-11-11 NOTE — ED ADULT NURSE NOTE - NSIMPLEMENTINTERV_GEN_ALL_ED
Implemented All Universal Safety Interventions:  [place] to call system. Call bell, personal items and telephone within reach. Instruct patient to call for assistance. Room bathroom lighting operational. Non-slip footwear when patient is off stretcher. Physically safe environment: no spills, clutter or unnecessary equipment. Stretcher in lowest position, wheels locked, appropriate side rails in place.

## 2018-11-11 NOTE — ED PROVIDER NOTE - OBJECTIVE STATEMENT
76 yo male with hx of depression and anxiety co sob, depression and anxiety for several days. Tearful and sad pt states he has had these episodes frequently since his wife . Seen in ER last week for same. Had psych eval which recommended outpt treatment but hasn't gone yet. Son who lives nearby is away for a few days. Pt denies CP, fever, cough, NVDC, or other symptom. Poor historian cannot name his PCP or medications.

## 2018-11-11 NOTE — ED BEHAVIORAL HEALTH ASSESSMENT NOTE - OTHER PAST PSYCHIATRIC HISTORY (INCLUDE DETAILS REGARDING ONSET, COURSE OF ILLNESS, INPATIENT/OUTPATIENT TREATMENT)
Recent psychiatric history of grief since 2016 (saw psychiatrist for 1 year), no past psychiatric hospitalizations, no reported suicide attempts, no history of self-injurious behavior, no violence, no prior arrests, no substance abuse, no inpatient rehab or withdrawal symptoms. Upcoming new psychiatrist appointment 11/13/18 at 1:15 PM.

## 2018-11-11 NOTE — ED ADULT NURSE REASSESSMENT NOTE - NS ED NURSE REASSESS COMMENT FT1
pt fed breakfast and tolerated calm and cooperative awaiting psych evaluation offers no c/o at present
pt tearful at times denies wanting to hurt self or others upset lonely wife  2 years ago pt denies any pains monitor nsr with rare pvc unifocal  denies dizziness pt pending labs and dispo
pt cleared by psych  pt re evaluated by md and to be d'c/d  pt discharged stable and ambulatory in nad at present d/c instruction reinforced and pt verbalized understanding vital signs as charted pt has appointment with psych Tuesday per pt

## 2018-11-11 NOTE — ED PROVIDER NOTE - MEDICAL DECISION MAKING DETAILS
76 yo male with chronic depression grief reaction co sob for several days. Recently seen in ER for same seen by Psych. Has not had outpt psych fu yet. Plan - med clearance, psych eval.

## 2018-11-11 NOTE — ED BEHAVIORAL HEALTH ASSESSMENT NOTE - RISK ASSESSMENT
patient does not present an imminent risk to self or others as evidence by no suicidal thoughts/plan/intent, no homicidal thoughts, no maryanne/hypomania, no psychosis, does not meet criteria for major depressive episode, no history of suicide attempts, no history of inpatient admissions, no aggression/violence, no legal issues, no substance abuse problems, future oriented, able to safety plan, treatment seeking, supportive family/friend, upcoming psychiatrist appointment and calm/cooperative throughout evaluation.    risk factors- loss of wife in 2016 resulting in prolonged bereavement and grief reaction, elderly  male, lives alone

## 2018-11-11 NOTE — ED PROVIDER NOTE - CHPI ED SYMPTOMS POS
[Good] : ~his/her~  mood as  good [No falls in past year] : Patient reported no falls in the past year [0] : 2) Feeling down, depressed, or hopeless: Not at all (0) [With Family] : lives with family [Student] : student [Single] : single [Fully functional (bathing, dressing, toileting, transferring, walking, feeding)] : Fully functional (bathing, dressing, toileting, transferring, walking, feeding) [Fully functional (using the telephone, shopping, preparing meals, housekeeping, doing laundry, using] : Fully functional and needs no help or supervision to perform IADLs (using the telephone, shopping, preparing meals, housekeeping, doing laundry, using transportation, managing medications and managing finances) [Smoke Detector] : smoke detector [Seat Belt] :  uses seat belt [Discussed at today's visit] : Advance Directives Discussed at today's visit [] : No [[id number]] : 0 [Reports changes in dental health] : Reports no changes in dental health ANXIETY/DEPRESSION/SADNESS

## 2018-11-11 NOTE — ED PROVIDER NOTE - CHPI ED SYMPTOMS NEG
no change in level of consciousness/no hallucinations/no paranoia/no disorientation/no weakness/no suicidal/no agitation/no homicidal/no weight loss

## 2018-11-11 NOTE — ED BEHAVIORAL HEALTH ASSESSMENT NOTE - SUMMARY
Patient is a 77 year-old  male, retired, living alone,  2016, with recent psychiatric history grief 2016 (saw psychiatrist for 1 year), with no other prior psychiatric history, with no prior / current suicidal ideation, with no prior suicide attempt, with no prior in-patient hospitalization, no legal history, no history violence/aggression, no substance abuse history. PMH HLD, HTN. Self-referred for shortness of breath. Psychiatry consulted for depression in the setting of grief.    Patient came to the ER in the context of SOB after feeling sad when thinking about wife and developed SOB. He stated he has been having these symptoms 1x per week over the last month where he becomes short of breath and sad when remembering wife. Per son and past chart review patient has had depressed mood over the last 3 years in the context of bereavement since death of wife. His symptoms are indicative of Grief reaction with prolonged bereavement. Patient denies SI/HI/SIB/intent/plan. He does not meet criteria for major depressive episode and is not acutely manic or psychotic. Patient is not seeking and refused inpatient psychiatric admission. He does not meet criteria for involuntary inpatient admission. Patient is future oriented, able to safety plan and treatment seeking. Patient to be discharged and will follow up with upcoming psychiatrist appointment 11/13/18 and son will drive him. Extensive safety planning performed. Patient and family agreeing verbally to return patient to ER or call 911 if symptoms worsen or patient has urges to harm self or others.

## 2018-11-13 ENCOUNTER — APPOINTMENT (OUTPATIENT)
Dept: PSYCHIATRY | Facility: CLINIC | Age: 78
End: 2018-11-13
Payer: MEDICARE

## 2018-11-13 DIAGNOSIS — I10 ESSENTIAL (PRIMARY) HYPERTENSION: ICD-10-CM

## 2018-11-13 DIAGNOSIS — F41.9 ANXIETY DISORDER, UNSPECIFIED: ICD-10-CM

## 2018-11-13 DIAGNOSIS — F32.9 ANXIETY DISORDER, UNSPECIFIED: ICD-10-CM

## 2018-11-13 PROCEDURE — 99205 OFFICE O/P NEW HI 60 MIN: CPT

## 2018-11-13 RX ORDER — CLONAZEPAM 0.5 MG/1
0.5 TABLET ORAL DAILY
Qty: 30 | Refills: 0 | Status: ACTIVE | COMMUNITY
Start: 2018-11-13 | End: 1900-01-01

## 2018-11-13 RX ORDER — ENALAPRIL MALEATE 20 MG/1
20 TABLET ORAL
Refills: 0 | Status: ACTIVE | COMMUNITY

## 2018-12-04 ENCOUNTER — APPOINTMENT (OUTPATIENT)
Dept: PSYCHIATRY | Facility: CLINIC | Age: 78
End: 2018-12-04

## 2018-12-11 ENCOUNTER — APPOINTMENT (OUTPATIENT)
Dept: PSYCHIATRY | Facility: CLINIC | Age: 78
End: 2018-12-11
Payer: MEDICARE

## 2018-12-11 PROCEDURE — 99213 OFFICE O/P EST LOW 20 MIN: CPT

## 2018-12-11 RX ORDER — MIRTAZAPINE 7.5 MG/1
7.5 TABLET, FILM COATED ORAL
Qty: 30 | Refills: 2 | Status: ACTIVE | COMMUNITY
Start: 2018-11-13 | End: 1900-01-01

## 2019-02-05 ENCOUNTER — APPOINTMENT (OUTPATIENT)
Dept: PSYCHIATRY | Facility: CLINIC | Age: 79
End: 2019-02-05

## 2019-04-26 ENCOUNTER — INPATIENT (INPATIENT)
Facility: HOSPITAL | Age: 79
LOS: 6 days | Discharge: HOME HEALTH | DRG: 305 | End: 2019-05-03
Attending: HOSPITALIST | Admitting: HOSPITALIST
Payer: MEDICARE

## 2019-04-26 VITALS
SYSTOLIC BLOOD PRESSURE: 210 MMHG | WEIGHT: 169.98 LBS | TEMPERATURE: 98 F | OXYGEN SATURATION: 97 % | RESPIRATION RATE: 26 BRPM | DIASTOLIC BLOOD PRESSURE: 110 MMHG | HEART RATE: 92 BPM | HEIGHT: 70 IN

## 2019-04-26 DIAGNOSIS — I16.0 HYPERTENSIVE URGENCY: ICD-10-CM

## 2019-04-26 LAB
ALBUMIN SERPL ELPH-MCNC: 3.6 G/DL — SIGNIFICANT CHANGE UP (ref 3.3–5)
ALP SERPL-CCNC: 120 U/L — SIGNIFICANT CHANGE UP (ref 30–120)
ALT FLD-CCNC: 26 U/L DA — SIGNIFICANT CHANGE UP (ref 10–60)
ANION GAP SERPL CALC-SCNC: 10 MMOL/L — SIGNIFICANT CHANGE UP (ref 5–17)
AST SERPL-CCNC: 23 U/L — SIGNIFICANT CHANGE UP (ref 10–40)
BASOPHILS # BLD AUTO: 0.06 K/UL — SIGNIFICANT CHANGE UP (ref 0–0.2)
BASOPHILS NFR BLD AUTO: 0.6 % — SIGNIFICANT CHANGE UP (ref 0–2)
BILIRUB SERPL-MCNC: 0.4 MG/DL — SIGNIFICANT CHANGE UP (ref 0.2–1.2)
BUN SERPL-MCNC: 13 MG/DL — SIGNIFICANT CHANGE UP (ref 7–23)
CALCIUM SERPL-MCNC: 9.7 MG/DL — SIGNIFICANT CHANGE UP (ref 8.4–10.5)
CHLORIDE SERPL-SCNC: 105 MMOL/L — SIGNIFICANT CHANGE UP (ref 96–108)
CO2 SERPL-SCNC: 26 MMOL/L — SIGNIFICANT CHANGE UP (ref 22–31)
CREAT SERPL-MCNC: 1.31 MG/DL — HIGH (ref 0.5–1.3)
EOSINOPHIL # BLD AUTO: 0.11 K/UL — SIGNIFICANT CHANGE UP (ref 0–0.5)
EOSINOPHIL NFR BLD AUTO: 1.1 % — SIGNIFICANT CHANGE UP (ref 0–6)
GLUCOSE SERPL-MCNC: 93 MG/DL — SIGNIFICANT CHANGE UP (ref 70–99)
HCT VFR BLD CALC: 40.1 % — SIGNIFICANT CHANGE UP (ref 39–50)
HGB BLD-MCNC: 12.7 G/DL — LOW (ref 13–17)
IMM GRANULOCYTES NFR BLD AUTO: 0.4 % — SIGNIFICANT CHANGE UP (ref 0–1.5)
LIDOCAIN IGE QN: 101 U/L — SIGNIFICANT CHANGE UP (ref 73–393)
LYMPHOCYTES # BLD AUTO: 2.24 K/UL — SIGNIFICANT CHANGE UP (ref 1–3.3)
LYMPHOCYTES # BLD AUTO: 23.3 % — SIGNIFICANT CHANGE UP (ref 13–44)
MCHC RBC-ENTMCNC: 24.5 PG — LOW (ref 27–34)
MCHC RBC-ENTMCNC: 31.7 GM/DL — LOW (ref 32–36)
MCV RBC AUTO: 77.3 FL — LOW (ref 80–100)
MONOCYTES # BLD AUTO: 0.64 K/UL — SIGNIFICANT CHANGE UP (ref 0–0.9)
MONOCYTES NFR BLD AUTO: 6.7 % — SIGNIFICANT CHANGE UP (ref 2–14)
NEUTROPHILS # BLD AUTO: 6.53 K/UL — SIGNIFICANT CHANGE UP (ref 1.8–7.4)
NEUTROPHILS NFR BLD AUTO: 67.9 % — SIGNIFICANT CHANGE UP (ref 43–77)
NRBC # BLD: 0 /100 WBCS — SIGNIFICANT CHANGE UP (ref 0–0)
PLATELET # BLD AUTO: 264 K/UL — SIGNIFICANT CHANGE UP (ref 150–400)
POTASSIUM SERPL-MCNC: 3.3 MMOL/L — LOW (ref 3.5–5.3)
POTASSIUM SERPL-SCNC: 3.3 MMOL/L — LOW (ref 3.5–5.3)
PROT SERPL-MCNC: 6.9 G/DL — SIGNIFICANT CHANGE UP (ref 6–8.3)
RBC # BLD: 5.19 M/UL — SIGNIFICANT CHANGE UP (ref 4.2–5.8)
RBC # FLD: 15.7 % — HIGH (ref 10.3–14.5)
SODIUM SERPL-SCNC: 141 MMOL/L — SIGNIFICANT CHANGE UP (ref 135–145)
TROPONIN I SERPL-MCNC: 0.05 NG/ML — SIGNIFICANT CHANGE UP (ref 0.02–0.06)
TROPONIN I SERPL-MCNC: 0.05 NG/ML — SIGNIFICANT CHANGE UP (ref 0.02–0.06)
WBC # BLD: 9.62 K/UL — SIGNIFICANT CHANGE UP (ref 3.8–10.5)
WBC # FLD AUTO: 9.62 K/UL — SIGNIFICANT CHANGE UP (ref 3.8–10.5)

## 2019-04-26 PROCEDURE — 93010 ELECTROCARDIOGRAM REPORT: CPT

## 2019-04-26 PROCEDURE — 71045 X-RAY EXAM CHEST 1 VIEW: CPT | Mod: 26

## 2019-04-26 PROCEDURE — 70450 CT HEAD/BRAIN W/O DYE: CPT | Mod: 26

## 2019-04-26 PROCEDURE — 99285 EMERGENCY DEPT VISIT HI MDM: CPT

## 2019-04-26 PROCEDURE — 99222 1ST HOSP IP/OBS MODERATE 55: CPT | Mod: AI

## 2019-04-26 RX ORDER — METOPROLOL TARTRATE 50 MG
1 TABLET ORAL
Qty: 0 | Refills: 0 | COMMUNITY

## 2019-04-26 RX ORDER — HYDRALAZINE HCL 50 MG
25 TABLET ORAL EVERY 8 HOURS
Qty: 0 | Refills: 0 | Status: DISCONTINUED | OUTPATIENT
Start: 2019-04-26 | End: 2019-05-03

## 2019-04-26 RX ORDER — ASPIRIN/CALCIUM CARB/MAGNESIUM 324 MG
81 TABLET ORAL DAILY
Qty: 0 | Refills: 0 | Status: DISCONTINUED | OUTPATIENT
Start: 2019-04-26 | End: 2019-05-03

## 2019-04-26 RX ORDER — METOPROLOL TARTRATE 50 MG
50 TABLET ORAL ONCE
Qty: 0 | Refills: 0 | Status: COMPLETED | OUTPATIENT
Start: 2019-04-26 | End: 2019-04-26

## 2019-04-26 RX ORDER — HALOPERIDOL DECANOATE 100 MG/ML
2 INJECTION INTRAMUSCULAR EVERY 12 HOURS
Qty: 0 | Refills: 0 | Status: DISCONTINUED | OUTPATIENT
Start: 2019-04-26 | End: 2019-05-03

## 2019-04-26 RX ORDER — ACETAMINOPHEN 500 MG
650 TABLET ORAL EVERY 6 HOURS
Qty: 0 | Refills: 0 | Status: DISCONTINUED | OUTPATIENT
Start: 2019-04-26 | End: 2019-05-03

## 2019-04-26 RX ORDER — ALPRAZOLAM 0.25 MG
0.25 TABLET ORAL ONCE
Qty: 0 | Refills: 0 | Status: DISCONTINUED | OUTPATIENT
Start: 2019-04-26 | End: 2019-04-26

## 2019-04-26 RX ORDER — ASPIRIN/CALCIUM CARB/MAGNESIUM 324 MG
1 TABLET ORAL
Qty: 0 | Refills: 0 | COMMUNITY

## 2019-04-26 RX ORDER — ALPRAZOLAM 0.25 MG
0.25 TABLET ORAL EVERY 6 HOURS
Qty: 0 | Refills: 0 | Status: DISCONTINUED | OUTPATIENT
Start: 2019-04-26 | End: 2019-04-29

## 2019-04-26 RX ORDER — LABETALOL HCL 100 MG
10 TABLET ORAL ONCE
Qty: 0 | Refills: 0 | Status: COMPLETED | OUTPATIENT
Start: 2019-04-26 | End: 2019-04-26

## 2019-04-26 RX ORDER — POTASSIUM CHLORIDE 20 MEQ
40 PACKET (EA) ORAL ONCE
Qty: 0 | Refills: 0 | Status: COMPLETED | OUTPATIENT
Start: 2019-04-26 | End: 2019-04-26

## 2019-04-26 RX ORDER — ALPRAZOLAM 0.25 MG
0.25 TABLET ORAL EVERY 12 HOURS
Qty: 0 | Refills: 0 | Status: DISCONTINUED | OUTPATIENT
Start: 2019-04-26 | End: 2019-04-26

## 2019-04-26 RX ORDER — AMLODIPINE BESYLATE 2.5 MG/1
5 TABLET ORAL ONCE
Qty: 0 | Refills: 0 | Status: COMPLETED | OUTPATIENT
Start: 2019-04-26 | End: 2019-04-26

## 2019-04-26 RX ORDER — HEPARIN SODIUM 5000 [USP'U]/ML
5000 INJECTION INTRAVENOUS; SUBCUTANEOUS EVERY 12 HOURS
Qty: 0 | Refills: 0 | Status: DISCONTINUED | OUTPATIENT
Start: 2019-04-26 | End: 2019-05-03

## 2019-04-26 RX ORDER — LABETALOL HCL 100 MG
100 TABLET ORAL
Qty: 0 | Refills: 0 | Status: DISCONTINUED | OUTPATIENT
Start: 2019-04-26 | End: 2019-04-29

## 2019-04-26 RX ADMIN — Medication 0.25 MILLIGRAM(S): at 23:46

## 2019-04-26 RX ADMIN — Medication 0.25 MILLIGRAM(S): at 18:04

## 2019-04-26 RX ADMIN — Medication 25 MILLIGRAM(S): at 22:52

## 2019-04-26 RX ADMIN — Medication 81 MILLIGRAM(S): at 22:52

## 2019-04-26 RX ADMIN — Medication 40 MILLIEQUIVALENT(S): at 22:52

## 2019-04-26 RX ADMIN — AMLODIPINE BESYLATE 5 MILLIGRAM(S): 2.5 TABLET ORAL at 22:52

## 2019-04-26 RX ADMIN — Medication 10 MILLIGRAM(S): at 21:58

## 2019-04-26 RX ADMIN — Medication 100 MILLIGRAM(S): at 22:52

## 2019-04-26 RX ADMIN — Medication 50 MILLIGRAM(S): at 18:04

## 2019-04-26 NOTE — ED PROVIDER NOTE - CONSTITUTIONAL, MLM
normal... Well appearing, well nourished, awake, alert, oriented to person, place, time/situation and in no apparent distress but tearful at bedside.

## 2019-04-26 NOTE — H&P ADULT - HISTORY OF PRESENT ILLNESS
77 y/o M pt w/ PMHx HTN, HLD, depression, anxiety admitted for HTNive urgency.  Given Labetalol iv and xanax with improvement in anxiety and BP.  Prior ER visits for similar symptoms.  Currently getting echo  No current cardiopulmonary symptoms or other complaints.  appears baseline  not anxious anymore.  Does not remember meds.  likely poor compliance.  No headache

## 2019-04-26 NOTE — ED PROVIDER NOTE - CLINICAL SUMMARY MEDICAL DECISION MAKING FREE TEXT BOX
79 y/o M presenting with anxiety and uncontrolled HTN, noncompliant with medications. Will obtain screening labs, EKG, and will medicate for symptoms. Pt is well known to the emergency department for frequent visits for similar complaints.

## 2019-04-26 NOTE — H&P ADULT - ASSESSMENT
78 male with    1. HTNive urgency: Some improvement with Labetalol iv - continue PO dosing - titrate as needed.  SBP goal 140-150s acceptable for now.  May consider Hydralazine or Amlodipine for optimal BP control.  f/up echo  vitals q4  telemonitor  CT brain and CXR unremarkable    2. Anxiety: Xanax prn.  outpt psych f/up per prior evals.    3. Hypokalemia: repleted    4. IMPROVE VTE Individual Risk Assessment          RISK                                                          Points    [  ] Previous VTE                                                3    [  ] Thrombophilia                                             2    [  ] Lower limb paralysis                                    2        (unable to hold up >15 seconds)      [  ] Current Cancer                                             2         (within 6 months)    [  ] Immobilization > 24 hrs                              1    [  ] ICU/CCU stay > 24 hours                            1    [x  ] Age > 60                                                    1    IMPROVE VTE Score 1  Heparin SQ for dvt ppx    5. Dispo: home in 1-2 days pending clinical improvement.  consider SW eval prior to discharge.

## 2019-04-26 NOTE — ED PROVIDER NOTE - ENMT, MLM
Airway patent, Nasal mucosa clear. Mouth with normal mucosa. Throat has no vesicles, no oropharyngeal exudates and uvula is midline.  Normocephalic, atraumatic

## 2019-04-26 NOTE — H&P ADULT - NSHPPHYSICALEXAM_GEN_ALL_CORE
PHYSICAL EXAM:  GENERAL: No apparent distress  HEAD:  Atraumatic, Normocephalic  EYES: conjunctiva and sclera clear  ENMT: Moist mucous membranes  NECK: Supple  CHEST/LUNG: Clear to auscultation; no rales/wheeze or rubs  HEART: Regular rate and rhythm, no murmurs, rubs or gallops  ABDOMEN: Soft, Nontender, Nondistended; Bowel sounds present  EXTREMITIES:  No clubbing, cyanosis or edema  SKIN: No rashes or lesions  NERVOUS SYSTEM:  Alert & Oriented; Bilateral lower extremity mobile, sensation to light touch intact

## 2019-04-26 NOTE — ED PROVIDER NOTE - PROGRESS NOTE DETAILS
recd sign out, labs check, bp not controlled, plan to admit  patient recd meds with some improvement, enzok jaime hospitalist plan to admit

## 2019-04-26 NOTE — ED ADULT NURSE NOTE - CHPI ED NUR SYMPTOMS NEG
no back pain/no vomiting/no nausea/no fever/no dizziness/no chest pain/no syncope/no shortness of breath/no congestion/no diaphoresis

## 2019-04-26 NOTE — ED ADULT NURSE NOTE - NSIMPLEMENTINTERV_GEN_ALL_ED
Implemented All Universal Safety Interventions:  [place] to call system. Call bell, personal items and telephone within reach. Instruct patient to call for assistance. Room bathroom lighting operational. Non-slip footwear when patient is off stretcher. Physically safe environment: no spills, clutter or unnecessary equipment. Stretcher in lowest position, wheels locked, appropriate side rails in place.

## 2019-04-26 NOTE — H&P ADULT - NSHPLABSRESULTS_GEN_ALL_CORE
WBC Count: 9.62 K/uL (04-26 @ 18:10)  Platelet Count - Automated: 264 K/uL (04-26 @ 18:10)  Hematocrit: 40.1 % (04-26 @ 18:10)  RBC Count: 5.19 M/uL (04-26 @ 18:10)  Hemoglobin: 12.7 g/dL <L> (04-26 @ 18:10)      04-26    141  |  105  |  13  ----------------------------<  93  3.3<L>   |  26  |  1.31<H>    Ca    9.7      26 Apr 2019 18:10    TPro  6.9  /  Alb  3.6  /  TBili  0.4  /  DBili  x   /  AST  23  /  ALT  26  /  AlkPhos  120  04-26          CARDIAC MARKERS ( 26 Apr 2019 21:23 )  .050 ng/mL / x     / x     / x     / x      CARDIAC MARKERS ( 26 Apr 2019 18:10 )  .050 ng/mL / x     / x     / x     / x                    Alkaline Phosphatase, Serum: 120 U/L (04-26 @ 18:10)  Bilirubin Total, Serum: 0.4 mg/dL (04-26 @ 18:10)  Aspartate Aminotransferase (AST/SGOT): 23 U/L (04-26 @ 18:10)  Albumin, Serum: 3.6 g/dL (04-26 @ 18:10)  Alanine Aminotransferase (ALT/SGPT): 26 U/L DA (04-26 @ 18:10)            EKG: LVH

## 2019-04-26 NOTE — ED ADULT TRIAGE NOTE - ARRIVAL FROM
Patient has upcoming visit 4/8 with  Mitzi JACKSON    Last seen in office 2/20/19    Will await office visit 4/8 to place order because at last office visit note states:    1. CLEARLY IN REVIEW OF HIS PATHOLOGY, ONGOING MANAGEMENT IS APPROPRIATE AND NECESSARY  2. WE AGREE SURGICAL INTERVENTIONS FOR THE SPINE ARE UNNECESSARY AT THIS TIME  3. HE IS OPPOSED TO ANY SIGNIFICANT MEDICATION THERAPY WHICH I FULLY SUPPORT   urgent Care

## 2019-04-27 LAB
ALBUMIN SERPL ELPH-MCNC: 3.3 G/DL — SIGNIFICANT CHANGE UP (ref 3.3–5)
ALP SERPL-CCNC: 106 U/L — SIGNIFICANT CHANGE UP (ref 30–120)
ALT FLD-CCNC: 23 U/L DA — SIGNIFICANT CHANGE UP (ref 10–60)
ANION GAP SERPL CALC-SCNC: 9 MMOL/L — SIGNIFICANT CHANGE UP (ref 5–17)
AST SERPL-CCNC: 21 U/L — SIGNIFICANT CHANGE UP (ref 10–40)
BILIRUB SERPL-MCNC: 0.3 MG/DL — SIGNIFICANT CHANGE UP (ref 0.2–1.2)
BUN SERPL-MCNC: 14 MG/DL — SIGNIFICANT CHANGE UP (ref 7–23)
CALCIUM SERPL-MCNC: 9.4 MG/DL — SIGNIFICANT CHANGE UP (ref 8.4–10.5)
CHLORIDE SERPL-SCNC: 105 MMOL/L — SIGNIFICANT CHANGE UP (ref 96–108)
CK SERPL-CCNC: 92 U/L — SIGNIFICANT CHANGE UP (ref 39–308)
CO2 SERPL-SCNC: 26 MMOL/L — SIGNIFICANT CHANGE UP (ref 22–31)
CREAT SERPL-MCNC: 1.26 MG/DL — SIGNIFICANT CHANGE UP (ref 0.5–1.3)
GLUCOSE SERPL-MCNC: 108 MG/DL — HIGH (ref 70–99)
HCT VFR BLD CALC: 37.3 % — LOW (ref 39–50)
HGB BLD-MCNC: 11.9 G/DL — LOW (ref 13–17)
MCHC RBC-ENTMCNC: 25 PG — LOW (ref 27–34)
MCHC RBC-ENTMCNC: 31.9 GM/DL — LOW (ref 32–36)
MCV RBC AUTO: 78.4 FL — LOW (ref 80–100)
NRBC # BLD: 0 /100 WBCS — SIGNIFICANT CHANGE UP (ref 0–0)
PLATELET # BLD AUTO: 227 K/UL — SIGNIFICANT CHANGE UP (ref 150–400)
POTASSIUM SERPL-MCNC: 3.6 MMOL/L — SIGNIFICANT CHANGE UP (ref 3.5–5.3)
POTASSIUM SERPL-SCNC: 3.6 MMOL/L — SIGNIFICANT CHANGE UP (ref 3.5–5.3)
PROT SERPL-MCNC: 6.2 G/DL — SIGNIFICANT CHANGE UP (ref 6–8.3)
RBC # BLD: 4.76 M/UL — SIGNIFICANT CHANGE UP (ref 4.2–5.8)
RBC # FLD: 15.9 % — HIGH (ref 10.3–14.5)
SODIUM SERPL-SCNC: 140 MMOL/L — SIGNIFICANT CHANGE UP (ref 135–145)
TROPONIN I SERPL-MCNC: 0.03 NG/ML — SIGNIFICANT CHANGE UP (ref 0.02–0.06)
WBC # BLD: 8.57 K/UL — SIGNIFICANT CHANGE UP (ref 3.8–10.5)
WBC # FLD AUTO: 8.57 K/UL — SIGNIFICANT CHANGE UP (ref 3.8–10.5)

## 2019-04-27 PROCEDURE — 99233 SBSQ HOSP IP/OBS HIGH 50: CPT | Mod: GC

## 2019-04-27 RX ORDER — LABETALOL HCL 100 MG
10 TABLET ORAL EVERY 12 HOURS
Qty: 0 | Refills: 0 | Status: DISCONTINUED | OUTPATIENT
Start: 2019-04-27 | End: 2019-04-27

## 2019-04-27 RX ORDER — HYDRALAZINE HCL 50 MG
10 TABLET ORAL EVERY 12 HOURS
Qty: 0 | Refills: 0 | Status: DISCONTINUED | OUTPATIENT
Start: 2019-04-27 | End: 2019-05-03

## 2019-04-27 RX ORDER — INFLUENZA VIRUS VACCINE 15; 15; 15; 15 UG/.5ML; UG/.5ML; UG/.5ML; UG/.5ML
0.5 SUSPENSION INTRAMUSCULAR ONCE
Qty: 0 | Refills: 0 | Status: COMPLETED | OUTPATIENT
Start: 2019-04-27 | End: 2019-04-27

## 2019-04-27 RX ADMIN — HEPARIN SODIUM 5000 UNIT(S): 5000 INJECTION INTRAVENOUS; SUBCUTANEOUS at 06:23

## 2019-04-27 RX ADMIN — Medication 1 MILLIGRAM(S): at 19:56

## 2019-04-27 RX ADMIN — Medication 25 MILLIGRAM(S): at 06:24

## 2019-04-27 RX ADMIN — Medication 0.25 MILLIGRAM(S): at 11:22

## 2019-04-27 RX ADMIN — Medication 81 MILLIGRAM(S): at 11:24

## 2019-04-27 RX ADMIN — HEPARIN SODIUM 5000 UNIT(S): 5000 INJECTION INTRAVENOUS; SUBCUTANEOUS at 17:48

## 2019-04-27 RX ADMIN — Medication 100 MILLIGRAM(S): at 17:49

## 2019-04-27 RX ADMIN — Medication 25 MILLIGRAM(S): at 21:58

## 2019-04-27 RX ADMIN — Medication 25 MILLIGRAM(S): at 15:19

## 2019-04-27 RX ADMIN — Medication 0.25 MILLIGRAM(S): at 18:23

## 2019-04-27 RX ADMIN — Medication 0.25 MILLIGRAM(S): at 05:00

## 2019-04-27 RX ADMIN — Medication 100 MILLIGRAM(S): at 06:24

## 2019-04-27 NOTE — PROGRESS NOTE ADULT - ASSESSMENT
78 male with    1. Hypertensive urgency: improved.   SBP goal 140-150s acceptable for now.  May consider Hydralazine or Amlodipine for optimal BP control.  f/up echo  Continue with telemonitor  CT brain and CXR unremarkable    2. Anxiety: Xanax prn.  outpt psych evaluation awaiting. .    3. Hypokalemia: replaced.    4. IMPROVE VTE Individual Risk Assessment          RISK                                                          Points    [  ] Previous VTE                                                3    [  ] Thrombophilia                                             2    [  ] Lower limb paralysis                                    2        (unable to hold up >15 seconds)      [  ] Current Cancer                                             2         (within 6 months)    [  ] Immobilization > 24 hrs                              1    [  ] ICU/CCU stay > 24 hours                            1    [x  ] Age > 60                                                    1    IMPROVE VTE Score 1  Heparin SQ for dvt ppx    5. Disposition. : home in 1-2 days pending clinical improvement.

## 2019-04-27 NOTE — PATIENT PROFILE ADULT - NSPROMUTANXFEARADDRESSFT_GEN_A_NUR
Encouraged to verbalize concerns/anxiety about his health condition.Need somebody to remind him to take meds and follow-up with PMD

## 2019-04-27 NOTE — PATIENT PROFILE ADULT - NSPROMUTANXFEARFT_GEN_A_NUR
Patient anxious re-high B/P not taking meds for days.Also concern about his car park somewhere he canit remember

## 2019-04-27 NOTE — PROGRESS NOTE ADULT - SUBJECTIVE AND OBJECTIVE BOX
Patient is a 78y old  Male who presents with a chief complaint of elevated BP (26 Apr 2019 23:18)      INTERVAL HPI/OVERNIGHT EVENTS: no  acute overnight events , pt is seen and examined at the bed side.     MEDICATIONS  (STANDING):  aspirin  chewable 81 milliGRAM(s) Oral daily  heparin  Injectable 5000 Unit(s) SubCutaneous every 12 hours  hydrALAZINE 25 milliGRAM(s) Oral every 8 hours  influenza   Vaccine 0.5 milliLiter(s) IntraMuscular once  labetalol 100 milliGRAM(s) Oral two times a day    MEDICATIONS  (PRN):  acetaminophen   Tablet .. 650 milliGRAM(s) Oral every 6 hours PRN Moderate Pain (4 - 6)  ALPRAZolam 0.25 milliGRAM(s) Oral every 6 hours PRN anxiety  haloperidol    Injectable 2 milliGRAM(s) IntraMuscular every 12 hours PRN agitation  hydrALAZINE Injectable 10 milliGRAM(s) IV Push every 12 hours PRN SBP>180  LORazepam   Injectable 1 milliGRAM(s) IntraMuscular every 12 hours PRN Agitation      Allergies    Novocain (Unknown)    Intolerances        REVIEW OF SYSTEMS:  CONSTITUTIONAL: No fever or chills  HEENT:  No headache, no sore throat  RESPIRATORY: No cough, wheezing, or shortness of breath  CARDIOVASCULAR: No chest pain, palpitations, or leg swelling  GASTROINTESTINAL: No nausea, vomiting, or diarrhea  GENITOURINARY: No dysuria, frequency, or hematuria  NEUROLOGICAL: no focal weakness or dizziness  SKIN:  No rashes or lesions   MUSCULOSKELETAL: no myalgias   PSYCHIATRIC: No depression or anxiety      Vital Signs Last 24 Hrs  T(C): 36.4 (27 Apr 2019 14:53), Max: 37.3 (27 Apr 2019 01:13)  T(F): 97.6 (27 Apr 2019 14:53), Max: 99.1 (27 Apr 2019 01:13)  HR: 74 (27 Apr 2019 14:53) (70 - 94)  BP: 151/84 (27 Apr 2019 14:53) (137/80 - 220/110)  BP(mean): 100 (26 Apr 2019 22:00) (100 - 132)  RR: 21 (27 Apr 2019 14:53) (15 - 26)  SpO2: 96% (27 Apr 2019 14:53) (96% - 99%)    PHYSICAL EXAM:  GENERAL: NAD  HEENT:  EOMI, mmm  CHEST/LUNG:  CTA b/l, no rales, wheezes, or rhonchi  HEART:  RRR, S1, S2, []murmur  ABDOMEN:  BS+, soft, NT, ND  EXTREMITIES: no edema or calf tenderness  NERVOUS SYSTEM: AA&Ox3 , no focal neurological deficit.     DIET:     Cultures:     LABS:                        11.9   8.57  )-----------( 227      ( 27 Apr 2019 07:17 )             37.3     CBC Full  -  ( 27 Apr 2019 07:17 )  WBC Count : 8.57 K/uL  Hemoglobin : 11.9 g/dL  Hematocrit : 37.3 %  Platelet Count - Automated : 227 K/uL  Mean Cell Volume : 78.4 fl  Mean Cell Hemoglobin : 25.0 pg  Mean Cell Hemoglobin Concentration : 31.9 gm/dL  Auto Neutrophil # : x  Auto Lymphocyte # : x  Auto Monocyte # : x  Auto Eosinophil # : x  Auto Basophil # : x  Auto Neutrophil % : x  Auto Lymphocyte % : x  Auto Monocyte % : x  Auto Eosinophil % : x  Auto Basophil % : x    27 Apr 2019 07:17    140    |  105    |  14     ----------------------------<  108    3.6     |  26     |  1.26     Ca    9.4        27 Apr 2019 07:17    TPro  6.2    /  Alb  3.3    /  TBili  0.3    /  DBili  x      /  AST  21     /  ALT  23     /  AlkPhos  106    27 Apr 2019 07:17        CAPILLARY BLOOD GLUCOSE              RADIOLOGY & ADDITIONAL TESTS:    Personally reviewed.     Consultant(s) Notes Reviewed:  [x] YES  [ ] NO    Care Discussed with [ ] Consultants  [x] Patient  [ ] Family  [x]      [ ] Other; RN  DVT ppx  Advanced directive

## 2019-04-28 PROCEDURE — 99233 SBSQ HOSP IP/OBS HIGH 50: CPT | Mod: GC

## 2019-04-28 RX ORDER — AMLODIPINE BESYLATE 2.5 MG/1
10 TABLET ORAL DAILY
Qty: 0 | Refills: 0 | Status: DISCONTINUED | OUTPATIENT
Start: 2019-04-28 | End: 2019-05-03

## 2019-04-28 RX ADMIN — Medication 20 MILLIGRAM(S): at 17:37

## 2019-04-28 RX ADMIN — HEPARIN SODIUM 5000 UNIT(S): 5000 INJECTION INTRAVENOUS; SUBCUTANEOUS at 17:36

## 2019-04-28 RX ADMIN — Medication 81 MILLIGRAM(S): at 12:23

## 2019-04-28 RX ADMIN — HEPARIN SODIUM 5000 UNIT(S): 5000 INJECTION INTRAVENOUS; SUBCUTANEOUS at 05:09

## 2019-04-28 RX ADMIN — Medication 25 MILLIGRAM(S): at 05:09

## 2019-04-28 RX ADMIN — Medication 25 MILLIGRAM(S): at 21:25

## 2019-04-28 RX ADMIN — AMLODIPINE BESYLATE 10 MILLIGRAM(S): 2.5 TABLET ORAL at 12:23

## 2019-04-28 RX ADMIN — Medication 100 MILLIGRAM(S): at 17:37

## 2019-04-28 RX ADMIN — Medication 100 MILLIGRAM(S): at 05:09

## 2019-04-28 RX ADMIN — Medication 25 MILLIGRAM(S): at 15:36

## 2019-04-28 RX ADMIN — Medication 1.25 MILLIGRAM(S): at 09:01

## 2019-04-28 RX ADMIN — Medication 0.25 MILLIGRAM(S): at 17:32

## 2019-04-28 NOTE — PROGRESS NOTE ADULT - ASSESSMENT
78 male with    1. Hypertensive urgency:  BP was very high this am , Enalapril iv was given .   SBP goal 140-150s .  Cardiology evaluation appreciated. Recommendation as follows   - Continue enalapril 20 mg bid  - Continue labetalol 100 mg bid  - Start amlodipine 10 mg daily  - Continue hydralazine 25 mg tid  - If BP improves, wean off hydralazine and increase dose of labetalol  - Echo with normal LV systolic function, no significant valve issues.   Continue with telemonitor  CT brain and CXR unremarkable    2. Anxiety: Xanax prn.  psych evaluation awaiting. .    3. Hypokalemia: replaced.    4. IMPROVE VTE Individual Risk Assessment          RISK                                                          Points    [  ] Previous VTE                                                3    [  ] Thrombophilia                                             2    [  ] Lower limb paralysis                                    2        (unable to hold up >15 seconds)      [  ] Current Cancer                                             2         (within 6 months)    [  ] Immobilization > 24 hrs                              1    [  ] ICU/CCU stay > 24 hours                            1    [x  ] Age > 60                                                    1    IMPROVE VTE Score 1  Heparin SQ for dvt ppx    5. Disposition. : home in 1-2 days pending clinical improvement.

## 2019-04-28 NOTE — CONSULT NOTE ADULT - ASSESSMENT
The patient is a 78 year old male with a history of HTN, HL, depression, anxiety who is admitted with shortness of breath and hypertensive urgency.    Plan:  - Continue enalapril 20 mg bid  - Continue labetalol 100 mg bid  - Start amlodipine 10 mg daily  - Continue hydralazine 25 mg tid  - If BP improves, wean off hydralazine and increase dose of labetalol  - Echo with normal LV systolic function, no signficant valve issues  - Outpatient follow-up for BP control

## 2019-04-28 NOTE — CONSULT NOTE ADULT - SUBJECTIVE AND OBJECTIVE BOX
History of Present Illness: The patient is a 78 year old male with a history of HTN, HL, depression, anxiety who is admitted with shortness of breath and hypertensive urgency. He noted the day of presentation feeling short of breath with minimal exertion. No chest pain, palpitations, lower extremity edema, nausea. He had not taken his blood pressure medications in 2 weeks.    Past Medical/Surgical History:  HTN, HL, depression, anxiety    Medications:  MEDICATIONS  (STANDING):  amLODIPine   Tablet 10 milliGRAM(s) Oral daily  aspirin  chewable 81 milliGRAM(s) Oral daily  enalapril 20 milliGRAM(s) Oral two times a day  heparin  Injectable 5000 Unit(s) SubCutaneous every 12 hours  hydrALAZINE 25 milliGRAM(s) Oral every 8 hours  influenza   Vaccine 0.5 milliLiter(s) IntraMuscular once  labetalol 100 milliGRAM(s) Oral two times a day    MEDICATIONS  (PRN):  acetaminophen   Tablet .. 650 milliGRAM(s) Oral every 6 hours PRN Moderate Pain (4 - 6)  ALPRAZolam 0.25 milliGRAM(s) Oral every 6 hours PRN anxiety  haloperidol    Injectable 2 milliGRAM(s) IntraMuscular every 12 hours PRN agitation  hydrALAZINE Injectable 10 milliGRAM(s) IV Push every 12 hours PRN SBP>180  LORazepam   Injectable 1 milliGRAM(s) IntraMuscular every 12 hours PRN Agitation      Family History: Non-contributory family history of premature cardiovascular atherosclerotic disease    Social History: No tobacco, alcohol or drug use    Review of Systems:  General: No fevers, chills, weight loss or gain  Skin: No rashes, color changes  Cardiovascular: No chest pain, orthopnea  Respiratory: No shortness of breath, cough  Gastrointestinal: No nausea, abdominal pain  Genitourinary: No incontinence, pain with urination  Musculoskeletal: No pain, swelling, decreased range of motion  Neurological: No headache, weakness  Psychiatric: No depression, anxiety  Endocrine: No weight loss or gain, increased thirst  All other systems are comprehensively negative.    Physical Exam:  Vitals:        Vital Signs Last 24 Hrs  T(C): 36.8 (28 Apr 2019 07:44), Max: 37.1 (27 Apr 2019 23:15)  T(F): 98.2 (28 Apr 2019 07:44), Max: 98.8 (27 Apr 2019 23:15)  HR: 84 (28 Apr 2019 07:44) (73 - 87)  BP: 193/103 (28 Apr 2019 07:44) (146/95 - 193/103)  BP(mean): --  RR: 16 (28 Apr 2019 07:44) (16 - 21)  SpO2: 99% (28 Apr 2019 07:44) (96% - 99%)  General: NAD  HEENT: MMM  Neck: No JVD, no carotid bruit  Lungs: CTAB  CV: RRR, nl S1/S2, no M/R/G  Abdomen: S/NT/ND, +BS  Extremities: No LE edema, no cyanosis  Neuro: AAOx3, non-focal  Skin: No rash    Labs:                        11.9   8.57  )-----------( 227      ( 27 Apr 2019 07:17 )             37.3     04-27    140  |  105  |  14  ----------------------------<  108<H>  3.6   |  26  |  1.26    Ca    9.4      27 Apr 2019 07:17    TPro  6.2  /  Alb  3.3  /  TBili  0.3  /  DBili  x   /  AST  21  /  ALT  23  /  AlkPhos  106  04-27    CARDIAC MARKERS ( 27 Apr 2019 07:17 )  .034 ng/mL / x     / 92 U/L / x     / x      CARDIAC MARKERS ( 26 Apr 2019 21:23 )  .050 ng/mL / x     / x     / x     / x      CARDIAC MARKERS ( 26 Apr 2019 18:10 )  .050 ng/mL / x     / x     / x     / x              ECG: NSR, normal axis, LVH

## 2019-04-28 NOTE — PROGRESS NOTE ADULT - SUBJECTIVE AND OBJECTIVE BOX
Patient is a 78y old  Male who presents with a chief complaint of elevated BP (27 Apr 2019 15:48)      INTERVAL HPI/OVERNIGHT EVENTS: no acute overnight events.     MEDICATIONS  (STANDING):  amLODIPine   Tablet 10 milliGRAM(s) Oral daily  aspirin  chewable 81 milliGRAM(s) Oral daily  enalapril 20 milliGRAM(s) Oral two times a day  heparin  Injectable 5000 Unit(s) SubCutaneous every 12 hours  hydrALAZINE 25 milliGRAM(s) Oral every 8 hours  influenza   Vaccine 0.5 milliLiter(s) IntraMuscular once  labetalol 100 milliGRAM(s) Oral two times a day    MEDICATIONS  (PRN):  acetaminophen   Tablet .. 650 milliGRAM(s) Oral every 6 hours PRN Moderate Pain (4 - 6)  ALPRAZolam 0.25 milliGRAM(s) Oral every 6 hours PRN anxiety  haloperidol    Injectable 2 milliGRAM(s) IntraMuscular every 12 hours PRN agitation  hydrALAZINE Injectable 10 milliGRAM(s) IV Push every 12 hours PRN SBP>180  LORazepam   Injectable 1 milliGRAM(s) IntraMuscular every 12 hours PRN Agitation      Allergies    Novocain (Unknown)    Intolerances        REVIEW OF SYSTEMS:  CONSTITUTIONAL: No fever or chills  HEENT:  No headache, no sore throat  RESPIRATORY: No cough, wheezing, or shortness of breath  CARDIOVASCULAR: No chest pain, palpitations, or leg swelling  GASTROINTESTINAL: No nausea, vomiting, or diarrhea  GENITOURINARY: No dysuria, frequency, or hematuria  NEUROLOGICAL: no focal weakness or dizziness  SKIN:  No rashes or lesions   MUSCULOSKELETAL: no myalgias   PSYCHIATRIC: No depression or anxiety      Vital Signs Last 24 Hrs  T(C): 36.8 (28 Apr 2019 07:44), Max: 37.1 (27 Apr 2019 23:15)  T(F): 98.2 (28 Apr 2019 07:44), Max: 98.8 (27 Apr 2019 23:15)  HR: 84 (28 Apr 2019 07:44) (73 - 87)  BP: 193/103 (28 Apr 2019 07:44) (146/95 - 193/103)  BP(mean): --  RR: 16 (28 Apr 2019 07:44) (16 - 21)  SpO2: 99% (28 Apr 2019 07:44) (96% - 99%)    PHYSICAL EXAM:  GENERAL: NAD  HEENT:  EOMI, mmm  CHEST/LUNG:  CTA b/l, no rales, wheezes, or rhonchi  HEART:  RRR, S1, S2, []murmur  ABDOMEN:  BS+, soft, NT, ND  EXTREMITIES: no edema or calf tenderness  NERVOUS SYSTEM: AA&Ox3    DIET:     Cultures:     LABS:    CBC Full  -  ( 27 Apr 2019 07:17 )  WBC Count : 8.57 K/uL  Hemoglobin : 11.9 g/dL  Hematocrit : 37.3 %  Platelet Count - Automated : 227 K/uL  Mean Cell Volume : 78.4 fl  Mean Cell Hemoglobin : 25.0 pg  Mean Cell Hemoglobin Concentration : 31.9 gm/dL  Auto Neutrophil # : x  Auto Lymphocyte # : x  Auto Monocyte # : x  Auto Eosinophil # : x  Auto Basophil # : x  Auto Neutrophil % : x  Auto Lymphocyte % : x  Auto Monocyte % : x  Auto Eosinophil % : x  Auto Basophil % : x      Ca    9.4        27 Apr 2019 07:17          CAPILLARY BLOOD GLUCOSE              RADIOLOGY & ADDITIONAL TESTS:    Personally reviewed.     Consultant(s) Notes Reviewed:  [x] YES  [ ] NO    Care Discussed with [ ] Consultants  [x] Patient  [ ] Family  [x]      [ ] Other; RN  DVT ppx  Advanced directive

## 2019-04-29 ENCOUNTER — TRANSCRIPTION ENCOUNTER (OUTPATIENT)
Age: 79
End: 2019-04-29

## 2019-04-29 LAB
ANION GAP SERPL CALC-SCNC: 7 MMOL/L — SIGNIFICANT CHANGE UP (ref 5–17)
BUN SERPL-MCNC: 11 MG/DL — SIGNIFICANT CHANGE UP (ref 7–23)
CALCIUM SERPL-MCNC: 9.9 MG/DL — SIGNIFICANT CHANGE UP (ref 8.4–10.5)
CHLORIDE SERPL-SCNC: 106 MMOL/L — SIGNIFICANT CHANGE UP (ref 96–108)
CO2 SERPL-SCNC: 30 MMOL/L — SIGNIFICANT CHANGE UP (ref 22–31)
CREAT SERPL-MCNC: 1.1 MG/DL — SIGNIFICANT CHANGE UP (ref 0.5–1.3)
GLUCOSE SERPL-MCNC: 99 MG/DL — SIGNIFICANT CHANGE UP (ref 70–99)
HCT VFR BLD CALC: 39.1 % — SIGNIFICANT CHANGE UP (ref 39–50)
HGB BLD-MCNC: 12.2 G/DL — LOW (ref 13–17)
MCHC RBC-ENTMCNC: 24.9 PG — LOW (ref 27–34)
MCHC RBC-ENTMCNC: 31.2 GM/DL — LOW (ref 32–36)
MCV RBC AUTO: 79.8 FL — LOW (ref 80–100)
NRBC # BLD: 0 /100 WBCS — SIGNIFICANT CHANGE UP (ref 0–0)
PLATELET # BLD AUTO: 224 K/UL — SIGNIFICANT CHANGE UP (ref 150–400)
POTASSIUM SERPL-MCNC: 4.2 MMOL/L — SIGNIFICANT CHANGE UP (ref 3.5–5.3)
POTASSIUM SERPL-SCNC: 4.2 MMOL/L — SIGNIFICANT CHANGE UP (ref 3.5–5.3)
RBC # BLD: 4.9 M/UL — SIGNIFICANT CHANGE UP (ref 4.2–5.8)
RBC # FLD: 16.2 % — HIGH (ref 10.3–14.5)
SODIUM SERPL-SCNC: 143 MMOL/L — SIGNIFICANT CHANGE UP (ref 135–145)
WBC # BLD: 8.17 K/UL — SIGNIFICANT CHANGE UP (ref 3.8–10.5)
WBC # FLD AUTO: 8.17 K/UL — SIGNIFICANT CHANGE UP (ref 3.8–10.5)

## 2019-04-29 PROCEDURE — 90792 PSYCH DIAG EVAL W/MED SRVCS: CPT

## 2019-04-29 PROCEDURE — 99233 SBSQ HOSP IP/OBS HIGH 50: CPT

## 2019-04-29 RX ORDER — CLONAZEPAM 1 MG
0.25 TABLET ORAL
Qty: 0 | Refills: 0 | Status: DISCONTINUED | OUTPATIENT
Start: 2019-04-29 | End: 2019-05-01

## 2019-04-29 RX ORDER — LABETALOL HCL 100 MG
1 TABLET ORAL
Qty: 60 | Refills: 0
Start: 2019-04-29 | End: 2019-05-28

## 2019-04-29 RX ORDER — MIRTAZAPINE 45 MG/1
15 TABLET, ORALLY DISINTEGRATING ORAL AT BEDTIME
Qty: 0 | Refills: 0 | Status: DISCONTINUED | OUTPATIENT
Start: 2019-04-29 | End: 2019-05-01

## 2019-04-29 RX ORDER — AMLODIPINE BESYLATE 2.5 MG/1
1 TABLET ORAL
Qty: 30 | Refills: 0
Start: 2019-04-29 | End: 2019-05-28

## 2019-04-29 RX ORDER — ASPIRIN/CALCIUM CARB/MAGNESIUM 324 MG
1 TABLET ORAL
Qty: 0 | Refills: 0 | DISCHARGE
Start: 2019-04-29

## 2019-04-29 RX ORDER — LABETALOL HCL 100 MG
200 TABLET ORAL
Qty: 0 | Refills: 0 | Status: DISCONTINUED | OUTPATIENT
Start: 2019-04-29 | End: 2019-05-03

## 2019-04-29 RX ORDER — CLONAZEPAM 1 MG
0.25 TABLET ORAL THREE TIMES A DAY
Qty: 0 | Refills: 0 | Status: DISCONTINUED | OUTPATIENT
Start: 2019-04-29 | End: 2019-04-29

## 2019-04-29 RX ORDER — HYDRALAZINE HCL 50 MG
1 TABLET ORAL
Qty: 90 | Refills: 0
Start: 2019-04-29 | End: 2019-05-28

## 2019-04-29 RX ADMIN — Medication 200 MILLIGRAM(S): at 18:15

## 2019-04-29 RX ADMIN — HEPARIN SODIUM 5000 UNIT(S): 5000 INJECTION INTRAVENOUS; SUBCUTANEOUS at 06:05

## 2019-04-29 RX ADMIN — AMLODIPINE BESYLATE 10 MILLIGRAM(S): 2.5 TABLET ORAL at 06:06

## 2019-04-29 RX ADMIN — Medication 20 MILLIGRAM(S): at 06:06

## 2019-04-29 RX ADMIN — Medication 100 MILLIGRAM(S): at 06:06

## 2019-04-29 RX ADMIN — Medication 0.25 MILLIGRAM(S): at 11:38

## 2019-04-29 RX ADMIN — Medication 0.25 MILLIGRAM(S): at 15:33

## 2019-04-29 RX ADMIN — Medication 25 MILLIGRAM(S): at 06:06

## 2019-04-29 RX ADMIN — Medication 25 MILLIGRAM(S): at 13:49

## 2019-04-29 RX ADMIN — Medication 25 MILLIGRAM(S): at 20:30

## 2019-04-29 RX ADMIN — MIRTAZAPINE 15 MILLIGRAM(S): 45 TABLET, ORALLY DISINTEGRATING ORAL at 20:30

## 2019-04-29 RX ADMIN — Medication 81 MILLIGRAM(S): at 11:37

## 2019-04-29 RX ADMIN — HEPARIN SODIUM 5000 UNIT(S): 5000 INJECTION INTRAVENOUS; SUBCUTANEOUS at 18:15

## 2019-04-29 RX ADMIN — Medication 20 MILLIGRAM(S): at 18:15

## 2019-04-29 RX ADMIN — Medication 0.25 MILLIGRAM(S): at 20:29

## 2019-04-29 NOTE — DISCHARGE NOTE PROVIDER - NSDCHHNEEDSERVICE_GEN_ALL_CORE
Other, specify.../Observation and assessment/Medication teaching and assessment/Teaching and training

## 2019-04-29 NOTE — BEHAVIORAL HEALTH ASSESSMENT NOTE - NSBHCHARTREVIEWLAB_PSY_A_CORE FT
12.2   8.17  )-----------( 224      ( 29 Apr 2019 07:18 )             39.1   04-29    143  |  106  |  11  ----------------------------<  99  4.2   |  30  |  1.10    Ca    9.9      29 Apr 2019 07:18

## 2019-04-29 NOTE — DISCHARGE NOTE PROVIDER - NSDCCPCAREPLAN_GEN_ALL_CORE_FT
PRINCIPAL DISCHARGE DIAGNOSIS  Diagnosis: Hypertensive urgency  Assessment and Plan of Treatment: continue with current medications.  outpatient follow up with cardiology

## 2019-04-29 NOTE — DISCHARGE NOTE PROVIDER - PROVIDER TOKENS
PROVIDER:[TOKEN:[[id number]]],FREE:[LAST:[carri],FIRST:[akashto],PHONE:[(   )    -],FAX:[(   )    -]]

## 2019-04-29 NOTE — PROGRESS NOTE ADULT - SUBJECTIVE AND OBJECTIVE BOX
Chief Complaint: Shortness of breath    Interval Events: Hypertensive yesterday; improved today    Review of Systems:  General: No fevers, chills, weight loss or gain  Skin: No rashes, color changes  Cardiovascular: No chest pain, orthopnea  Respiratory: No shortness of breath, cough  Gastrointestinal: No nausea, abdominal pain  Genitourinary: No incontinence, pain with urination  Musculoskeletal: No pain, swelling, decreased range of motion  Neurological: No headache, weakness  Psychiatric: No depression, anxiety  Endocrine: No weight loss or gain, increased thirst  All other systems are comprehensively negative.    Physical Exam:  Vitals:        Vital Signs Last 24 Hrs  T(C): 36.7 (29 Apr 2019 07:36), Max: 36.7 (29 Apr 2019 07:36)  T(F): 98.1 (29 Apr 2019 07:36), Max: 98.1 (29 Apr 2019 07:36)  HR: 81 (29 Apr 2019 07:36) (76 - 92)  BP: 153/85 (29 Apr 2019 07:36) (146/72 - 184/106)  BP(mean): --  RR: 16 (29 Apr 2019 07:36) (16 - 17)  SpO2: 99% (29 Apr 2019 07:36) (96% - 99%)  General: NAD  HEENT: MMM  Neck: No JVD, no carotid bruit  Lungs: CTAB  CV: RRR, nl S1/S2, no M/R/G  Abdomen: S/NT/ND, +BS  Extremities: No LE edema, no cyanosis  Neuro: AAOx3, non-focal  Skin: No rash    Labs:                        12.2   8.17  )-----------( 224      ( 29 Apr 2019 07:18 )             39.1     04-29    143  |  106  |  11  ----------------------------<  99  4.2   |  30  |  1.10    Ca    9.9      29 Apr 2019 07:18              Telemetry: Sinus rhythm, atrial run with aberrancy

## 2019-04-29 NOTE — BEHAVIORAL HEALTH ASSESSMENT NOTE - HPI (INCLUDE ILLNESS QUALITY, SEVERITY, DURATION, TIMING, CONTEXT, MODIFYING FACTORS, ASSOCIATED SIGNS AND SYMPTOMS)
Patient is a 77 year-old  male, retired, living alone,  since 2016, with a history of depression since his wife passed away three years ago, with no prior / current suicidal ideation, with no history of suicidality, with no prior in-patient hospitalization, no legal history, no history violence/aggression, no substance abuse history admitted to medicine for uncontrolled hypertension. PMH HLD, HTN.  Psychiatry consulted for depression and anxiety since pt has been taking Xanax 0.25mg every six hours as a prn while hospitalized.     Patient presenting euthymic, reporting periodic depressed mood since the death of his wife. He denies currently feeling depressed. Patient reports being  60 years prior wife passing away and missed her very much.  Patient denies feeling depressed daily. Patient denies any hallucinations, does not report any delusional thought content, denies thought insertion/withdrawal, denies referential thought processes & is not paranoid on interview. Pt is linear, logical, organized and well related. Patient does not report nor exhibit any signs of maryanne, including irritable or elevated mood, grandiosity, pressured speech, risk-taking behaviors, increase in productivity or agitation. Patient denies any other depressive symptoms including anhedonia, changes in energy/concentration/appetite, preoccupation with death or feelings of guilt. Patient adamantly denies SI, intent or plan; denies any HI, violent thoughts.  Patient was AOx4, MSE w/n normal limits.   Collateral obtained from patient’s son, Austin Darnell [phone] previously, who confirmed patient’s current presentation and past psychiatric history. Pt has been referred to Dr Dickson at [facility] outpt is the past.

## 2019-04-29 NOTE — DISCHARGE NOTE PROVIDER - NSDCFUADDAPPT_GEN_ALL_CORE_FT
Dr Jim Hawkins (psychiatrist)   [phone] may call for an appointment.   [address]  [address]  [place], [place]

## 2019-04-29 NOTE — BEHAVIORAL HEALTH ASSESSMENT NOTE - SUMMARY
Patient is a 77 year-old  male, retired, living alone,  since 2016, with a history of depression since his wife passed away three years ago, with no prior / current suicidal ideation, with no history of suicidality, with no prior in-patient hospitalization, no legal history, no history violence/aggression, no substance abuse history admitted to medicine for uncontrolled hypertension. PMH HLD, HTN.  Psychiatry consulted for depression and anxiety since pt has been taking Xanax 0.25mg every six hours as a prn while hospitalized.     Patient presenting euthymic, reporting periodic depressed mood since the death of his wife. He denies currently feeling depressed. Patient reports being  60 years prior wife passing away and missed her very much.  Patient denies feeling depressed daily. Patient denies any hallucinations, does not report any delusional thought content, denies thought insertion/withdrawal, denies referential thought processes & is not paranoid on interview. Pt is linear, logical, organized and well related. Patient does not report nor exhibit any signs of maryanne, including irritable or elevated mood, grandiosity, pressured speech, risk-taking behaviors, increase in productivity or agitation. Patient denies any other depressive symptoms including anhedonia, changes in energy/concentration/appetite, preoccupation with death or feelings of guilt. Patient adamantly denies SI, intent or plan; denies any HI, violent thoughts.  Patient was AOx4, MSE w/n normal limits.   Collateral obtained from patient’s son, Austin Darnell [phone] previously, who confirmed patient’s current presentation and past psychiatric history. Pt has been referred to Dr Dickson at [facility] outpt is the past. Patient is a 77 year-old  male, retired, living alone,  since 2016, with a history of depression since his wife passed away three years ago, with no prior / current suicidal ideation, with no history of suicidality, with no prior in-patient hospitalization, no legal history, no history violence/aggression, no substance abuse history admitted to medicine for uncontrolled hypertension. PMH HLD, HTN.  Psychiatry consulted for depression and anxiety since pt has been taking Xanax 0.25mg every six hours as a prn while hospitalized.     Patient presenting euthymic, reporting periodic depressed mood since the death of his wife. He denies currently feeling depressed. Patient reports being  60 years prior wife passing away and missed her very much.    Collateral obtained from patient’s son, Austin Darnell [phone] previously, who confirmed patient’s current presentation and past psychiatric history. Pt has been referred to Dr Dickson at [facility] outpt is the past.  Plan:  Begin Remeron 15mg po qhs  Clonazepam 0.25mg po TID  Outpt follow up Patient is a 77 year-old  male, retired, living alone,  since 2016, with a history of depression since his wife passed away three years ago, with no prior / current suicidal ideation, with no history of suicidality, with no prior in-patient hospitalization, no legal history, no history violence/aggression, no substance abuse history admitted to medicine for uncontrolled hypertension. PMH HLD, HTN.  Psychiatry consulted for depression and anxiety since pt has been taking Xanax 0.25mg every six hours as a prn while hospitalized.     Patient presenting euthymic, reporting periodic depressed mood since the death of his wife. He denies currently feeling depressed. Patient reports being  60 years prior wife passing away and missed her very much.    Collateral obtained from patient’s son, Austin Darnell [phone] previously, who confirmed patient’s current presentation and past psychiatric history. Pt has been referred to Dr Dickson at [facility] outpt is the past. Pt is currently not clear about which medications he needs to take for his HTN and would benefit from help at home in order to take his medications on a regular basis.  His son, Austin recommended that his brother, Ehsan in [place] would be able to help the patient if the patient could stay with him as he did in the past.  Patient needs help in caring for his HTN and would benefit from home services if his children are unable to help him.   Plan:  Begin Remeron 15mg po qhs  Clonazepam 0.25mg po TID  Outpt follow up

## 2019-04-29 NOTE — BEHAVIORAL HEALTH ASSESSMENT NOTE - OTHER
Kasia Casas euthymic; tearful at times when discussing wife but quickly composed self and was joking with evaluator

## 2019-04-29 NOTE — BEHAVIORAL HEALTH ASSESSMENT NOTE - NSBHCONSULTFOLLOWAFTERCARE_PSY_A_CORE FT
Pt may f/u with Dr Dickson or see Dr Jim Hawkins in [place] [facility] de[facility] at [facility] [phone]. Pt may f/u with Dr Dickson or see Dr Jim Hawkins in [place] [facility] at [facility] [phone].     Patient needs help in caring for his HTN and would benefit from home services if his children are unable to help him.

## 2019-04-29 NOTE — DISCHARGE NOTE PROVIDER - CARE PROVIDER_API CALL
Yaniv Ulloa)  Cardiovascular Disease; Internal Medicine  [address]  [place] [address]  Phone: [phone]  Fax: [phone]  Follow Up Time:     cecilia christina  Phone: (   )    -  Fax: (   )    -  Follow Up Time:

## 2019-04-29 NOTE — BEHAVIORAL HEALTH ASSESSMENT NOTE - NSBHCHARTREVIEWVS_PSY_A_CORE FT
Vital Signs Last 24 Hrs  T(C): 36.7 (29 Apr 2019 07:36), Max: 36.7 (29 Apr 2019 07:36)  T(F): 98.1 (29 Apr 2019 07:36), Max: 98.1 (29 Apr 2019 07:36)  HR: 81 (29 Apr 2019 07:36) (76 - 92)  BP: 153/85 (29 Apr 2019 07:36) (146/72 - 184/106)  BP(mean): --  RR: 16 (29 Apr 2019 07:36) (16 - 17)  SpO2: 99% (29 Apr 2019 07:36) (96% - 99%)

## 2019-04-29 NOTE — PROGRESS NOTE ADULT - ASSESSMENT
The patient is a 78 year old male with a history of HTN, HL, depression, anxiety who is admitted with shortness of breath and hypertensive urgency.    Plan:  - Continue enalapril 20 mg bid  - Increase labetalol to 200 mg bid  - Continue amlodipine 10 mg daily  - Continue hydralazine 25 mg tid  - Echo with normal LV systolic function, no signficant valve issues  - Outpatient follow-up for BP control. Follow-up with me in 1-2 weeks.

## 2019-04-29 NOTE — DISCHARGE NOTE PROVIDER - HOSPITAL COURSE
78 male with        1. Hypertensive urgency:  BP was very high this am , Enalapril iv was given .     SBP goal 140-150s .    Cardiology evaluation appreciated. Recommendation as follows     - Continue enalapril 20 mg bid    - Continue labetalol 100 mg bid    - Start amlodipine 10 mg daily    - Continue hydralazine 25 mg tid    - BP stable.  Ok for discharge as per Cardiology.      - Echo with normal LV systolic function, no significant valve issues.     CT brain and CXR unremarkable        2. Anxiety: Xanax prn.    psych evaluation awaiting. .        3. Hypokalemia: replaced.        Patient reports that he feels better,  Wants to go home.  Offers no other complaints                            Constitutional: No fever, fatigue or weight loss.    Skin: No rash.    Eyes: No recent vision problems or eye pain.    ENT: No congestion, ear pain, or sore throat.    Endocrine: No thyroid problems.    Cardiovascular: No chest pain or palpation.    Respiratory: No cough, shortness of breath, congestion, or wheezing.    Gastrointestinal: No abdominal pain, nausea, vomiting, or diarrhea.    Genitourinary: No dysuria.    Musculoskeletal: No joint swelling.    Neurologic: No headache.            Vital Signs Last 24 Hrs    T(C): 36.7 (04-29-19 @ 07:36), Max: 36.7 (04-29-19 @ 07:36)    T(F): 98.1 (04-29-19 @ 07:36), Max: 98.1 (04-29-19 @ 07:36)    HR: 81 (04-29-19 @ 07:36) (76 - 92)    BP: 153/85 (04-29-19 @ 07:36) (146/72 - 184/106)    BP(mean): --    RR: 16 (04-29-19 @ 07:36) (16 - 17)    SpO2: 99% (04-29-19 @ 07:36) (96% - 99%)    PHYSICAL EXAM-    GENERAL: NAD, well-groomed, well-developed    HEAD:  Atraumatic, Normocephalic    EYES: EOMI, PERRLA, conjunctiva and sclera clear    NECK: Supple, No JVD, Normal thyroid    NERVOUS SYSTEM:  Alert & Oriented X3, Motor Strength 5/5 B/L upper and lower extremities; DTRs 2+ intact and symmetric    CHEST/LUNG: Clear to percussion bilaterally; No rales, rhonchi, wheezing, or rubs    HEART: Regular rate and rhythm; No murmurs, rubs, or gallops    ABDOMEN: Soft, Nontender, Nondistended; Bowel sounds present    EXTREMITIES:  2+ Peripheral Pulses, No clubbing, cyanosis, or edema    SKIN: No rashes or lesions 78 male with        1. Hypertensive urgency:  BP was very high this am , Enalapril iv was given .     SBP goal 140-150s .    Cardiology evaluation appreciated. Recommendation as follows     - Continue enalapril 20 mg bid    - Continue labetalol 100 mg bid    - Start amlodipine 10 mg daily    - Continue hydralazine 25 mg tid    - BP stable.  Ok for discharge as per Cardiology.      - Echo with normal LV systolic function, no significant valve issues.     CT brain and CXR unremarkable        2. Anxiety: Continue with current medications as per psych.  outpatient follow up with psych             3. Hypokalemia: replaced.    family agreed to take care of dad, and hiring extra help for him.  cleared for discharge as per Psych    Patient reports that he feels better,  Wants to go home.  Offers no other complaints                            Constitutional: No fever, fatigue or weight loss.    Skin: No rash.    Eyes: No recent vision problems or eye pain.    ENT: No congestion, ear pain, or sore throat.    Endocrine: No thyroid problems.    Cardiovascular: No chest pain or palpation.    Respiratory: No cough, shortness of breath, congestion, or wheezing.    Gastrointestinal: No abdominal pain, nausea, vomiting, or diarrhea.    Genitourinary: No dysuria.    Musculoskeletal: No joint swelling.    Neurologic: No headache.            Vital Signs Last 24 Hrs    T(C): 36.7 (04-29-19 @ 07:36), Max: 36.7 (04-29-19 @ 07:36)    T(F): 98.1 (04-29-19 @ 07:36), Max: 98.1 (04-29-19 @ 07:36)    HR: 81 (04-29-19 @ 07:36) (76 - 92)    BP: 153/85 (04-29-19 @ 07:36) (146/72 - 184/106)    BP(mean): --    RR: 16 (04-29-19 @ 07:36) (16 - 17)    SpO2: 99% (04-29-19 @ 07:36) (96% - 99%)    PHYSICAL EXAM-    GENERAL: NAD, well-groomed, well-developed    HEAD:  Atraumatic, Normocephalic    EYES: EOMI, PERRLA, conjunctiva and sclera clear    NECK: Supple, No JVD, Normal thyroid    NERVOUS SYSTEM:  Alert & Oriented X3, Motor Strength 5/5 B/L upper and lower extremities; DTRs 2+ intact and symmetric    CHEST/LUNG: Clear to percussion bilaterally; No rales, rhonchi, wheezing, or rubs    HEART: Regular rate and rhythm; No murmurs, rubs, or gallops    ABDOMEN: Soft, Nontender, Nondistended; Bowel sounds present    EXTREMITIES:  2+ Peripheral Pulses, No clubbing, cyanosis, or edema    SKIN: No rashes or lesions

## 2019-04-29 NOTE — BEHAVIORAL HEALTH ASSESSMENT NOTE - NSBHCHARTREVIEWINVESTIGATE_PSY_A_CORE FT
Ventricular Rate 92 BPM    Atrial Rate 92 BPM    P-R Interval 200 ms    QRS Duration 98 ms    Q-T Interval 380 ms    QTC Calculation(Bezet) 469 ms    P Axis 2 degrees    R Axis 23 degrees    T Axis 36 degrees    Diagnosis Line Normal sinus rhythm  Voltage criteria for left ventricular hypertrophy  Nonspecific T wave abnormality  Abnormal ECG  When compared with ECG of 11-NOV-2018 08:12,  Nonspecific T wave abnormality now evident in Anterolateral leads  Confirmed by Evelina Ulloa MD ([id number]) on 4/28/2019 8:23:12 AM

## 2019-04-30 DIAGNOSIS — F33.1 MAJOR DEPRESSIVE DISORDER, RECURRENT, MODERATE: ICD-10-CM

## 2019-04-30 PROCEDURE — 99232 SBSQ HOSP IP/OBS MODERATE 35: CPT

## 2019-04-30 RX ADMIN — Medication 25 MILLIGRAM(S): at 15:10

## 2019-04-30 RX ADMIN — Medication 20 MILLIGRAM(S): at 18:11

## 2019-04-30 RX ADMIN — Medication 20 MILLIGRAM(S): at 05:26

## 2019-04-30 RX ADMIN — Medication 25 MILLIGRAM(S): at 05:26

## 2019-04-30 RX ADMIN — HEPARIN SODIUM 5000 UNIT(S): 5000 INJECTION INTRAVENOUS; SUBCUTANEOUS at 05:26

## 2019-04-30 RX ADMIN — Medication 200 MILLIGRAM(S): at 05:26

## 2019-04-30 RX ADMIN — Medication 0.25 MILLIGRAM(S): at 15:09

## 2019-04-30 RX ADMIN — Medication 200 MILLIGRAM(S): at 18:11

## 2019-04-30 RX ADMIN — Medication 81 MILLIGRAM(S): at 12:35

## 2019-04-30 RX ADMIN — Medication 0.25 MILLIGRAM(S): at 21:00

## 2019-04-30 RX ADMIN — Medication 0.25 MILLIGRAM(S): at 08:09

## 2019-04-30 RX ADMIN — MIRTAZAPINE 15 MILLIGRAM(S): 45 TABLET, ORALLY DISINTEGRATING ORAL at 21:00

## 2019-04-30 RX ADMIN — AMLODIPINE BESYLATE 10 MILLIGRAM(S): 2.5 TABLET ORAL at 05:26

## 2019-04-30 RX ADMIN — Medication 25 MILLIGRAM(S): at 21:00

## 2019-04-30 RX ADMIN — HEPARIN SODIUM 5000 UNIT(S): 5000 INJECTION INTRAVENOUS; SUBCUTANEOUS at 18:11

## 2019-04-30 NOTE — PHYSICAL THERAPY INITIAL EVALUATION ADULT - PERTINENT HX OF CURRENT PROBLEM, REHAB EVAL
Pt admitted with hypertensive urgency.  Head CT neg; CXR neg; Echo->normal left ventricular function

## 2019-04-30 NOTE — PROGRESS NOTE ADULT - ASSESSMENT
78 male with    1. Hypertensive urgency:  BP was very high this am , Enalapril iv was given .   SBP goal 140-150s .  Cardiology evaluation appreciated. Recommendation as follows   - Continue enalapril 20 mg bid  - Continue labetalol 100 mg bid  - Start amlodipine 10 mg daily  - Continue hydralazine 25 mg tid  - BP stable  - Echo with normal LV systolic function, no significant valve issues.   Continue with telemonitor  CT brain and CXR unremarkable    2. Anxiety: Xanax prn.  psych recommended safe discharge planning .    3. Hypokalemia: replaced.    4. IMPROVE VTE Individual Risk Assessment          RISK                                                          Points    [  ] Previous VTE                                                3    [  ] Thrombophilia                                             2    [  ] Lower limb paralysis                                    2        (unable to hold up >15 seconds)      [  ] Current Cancer                                             2         (within 6 months)    [  ] Immobilization > 24 hrs                              1    [  ] ICU/CCU stay > 24 hours                            1    [x  ] Age > 60                                                    1    IMPROVE VTE Score 1  Heparin SQ for dvt ppx    5. Disposition. : home in 1-2 days pending clinical improvement.

## 2019-04-30 NOTE — PROGRESS NOTE ADULT - ASSESSMENT
The patient is a 78 year old male with a history of HTN, HL, depression, anxiety who is admitted with shortness of breath and hypertensive urgency.    Plan:  - Continue enalapril 20 mg bid  - Continue labetalol to 200 mg bid  - Continue amlodipine 10 mg daily  - Continue hydralazine 25 mg tid  - Echo with normal LV systolic function, no signficant valve issues  - Outpatient follow-up for BP control. Follow-up with me in 1-2 weeks.

## 2019-04-30 NOTE — PROGRESS NOTE ADULT - SUBJECTIVE AND OBJECTIVE BOX
CC.  elevated BP     HPI.  Patient is asymptomatic.  Feels better.  Offers no other complaints                Constitutional: No fever, fatigue or weight loss.  Skin: No rash.  Eyes: No recent vision problems or eye pain.  ENT: No congestion, ear pain, or sore throat.  Endocrine: No thyroid problems.  Cardiovascular: No chest pain or palpation.  Respiratory: No cough, shortness of breath, congestion, or wheezing.  Gastrointestinal: No abdominal pain, nausea, vomiting, or diarrhea.  Genitourinary: No dysuria.  Musculoskeletal: No joint swelling.  Neurologic: No headache.      Vital Signs Last 24 Hrs  T(C): 36.4 (04-30-19 @ 08:12), Max: 36.9 (04-29-19 @ 20:00)  T(F): 97.5 (04-30-19 @ 08:12), Max: 98.5 (04-29-19 @ 20:00)  HR: 70 (04-30-19 @ 08:12) (70 - 89)  BP: 116/71 (04-30-19 @ 08:12) (116/71 - 174/95)  BP(mean): --  RR: 16 (04-30-19 @ 08:12) (16 - 16)  SpO2: 96% (04-30-19 @ 09:32) (96% - 100%)        PHYSICAL EXAM-  GENERAL: NAD, well-groomed, well-developed  HEAD:  Atraumatic, Normocephalic  EYES: EOMI, PERRLA, conjunctiva and sclera clear  NECK: Supple, No JVD, Normal thyroid  NERVOUS SYSTEM:  Alert & Oriented X3, Motor Strength 5/5 B/L upper and lower extremities; DTRs 2+ intact and symmetric  CHEST/LUNG: Clear to percussion bilaterally; No rales, rhonchi, wheezing, or rubs  HEART: Regular rate and rhythm; No murmurs, rubs, or gallops  ABDOMEN: Soft, Nontender, Nondistended; Bowel sounds present  EXTREMITIES:  2+ Peripheral Pulses, No clubbing, cyanosis, or edema  SKIN: No rashes or lesions                                  12.2   8.17  )-----------( 224      ( 29 Apr 2019 07:18 )             39.1     04-29    143  |  106  |  11  ----------------------------<  99  4.2   |  30  |  1.10    Ca    9.9      29 Apr 2019 07:18                      MEDICATIONS  (STANDING):  amLODIPine   Tablet 10 milliGRAM(s) Oral daily  aspirin  chewable 81 milliGRAM(s) Oral daily  clonazePAM Tablet 0.25 milliGRAM(s) Oral <User Schedule>  enalapril 20 milliGRAM(s) Oral two times a day  heparin  Injectable 5000 Unit(s) SubCutaneous every 12 hours  hydrALAZINE 25 milliGRAM(s) Oral every 8 hours  influenza   Vaccine 0.5 milliLiter(s) IntraMuscular once  labetalol 200 milliGRAM(s) Oral two times a day  mirtazapine 15 milliGRAM(s) Oral at bedtime    MEDICATIONS  (PRN):  acetaminophen   Tablet .. 650 milliGRAM(s) Oral every 6 hours PRN Moderate Pain (4 - 6)  haloperidol    Injectable 2 milliGRAM(s) IntraMuscular every 12 hours PRN agitation  hydrALAZINE Injectable 10 milliGRAM(s) IV Push every 12 hours PRN SBP>180  LORazepam   Injectable 1 milliGRAM(s) IntraMuscular every 12 hours PRN Agitation      Imaging Personally Reviewed:     [x ] YES  [ ] NO    Consultant(s) Notes Reviewed:  [x ] YES  [ ] NO    Care Discussed with Consultants/Other Providers [x ] YES  [ ] No medical contraindication for discharge

## 2019-04-30 NOTE — PROGRESS NOTE ADULT - SUBJECTIVE AND OBJECTIVE BOX
Chief Complaint: Shortness of breath    Interval Events: No events overnight. No complaints.    Review of Systems:  General: No fevers, chills, weight loss or gain  Skin: No rashes, color changes  Cardiovascular: No chest pain, orthopnea  Respiratory: No shortness of breath, cough  Gastrointestinal: No nausea, abdominal pain  Genitourinary: No incontinence, pain with urination  Musculoskeletal: No pain, swelling, decreased range of motion  Neurological: No headache, weakness  Psychiatric: No depression, anxiety  Endocrine: No weight loss or gain, increased thirst  All other systems are comprehensively negative.    Physical Exam:  Vitals:        Vital Signs Last 24 Hrs  T(C): 36.7 (29 Apr 2019 07:36), Max: 36.7 (29 Apr 2019 07:36)  T(F): 98.1 (29 Apr 2019 07:36), Max: 98.1 (29 Apr 2019 07:36)  HR: 81 (29 Apr 2019 07:36) (76 - 92)  BP: 153/85 (29 Apr 2019 07:36) (146/72 - 184/106)  BP(mean): --  RR: 16 (29 Apr 2019 07:36) (16 - 17)  SpO2: 99% (29 Apr 2019 07:36) (96% - 99%)  General: NAD  HEENT: MMM  Neck: No JVD, no carotid bruit  Lungs: CTAB  CV: RRR, nl S1/S2, no M/R/G  Abdomen: S/NT/ND, +BS  Extremities: No LE edema, no cyanosis  Neuro: AAOx3, non-focal  Skin: No rash    Labs:             04-29    143  |  106  |  11  ----------------------------<  99  4.2   |  30  |  1.10    Ca    9.9      29 Apr 2019 07:18                          12.2   8.17  )-----------( 224      ( 29 Apr 2019 07:18 )             39.1         Telemetry: Sinus rhythm

## 2019-05-01 DIAGNOSIS — R41.89 OTHER SYMPTOMS AND SIGNS INVOLVING COGNITIVE FUNCTIONS AND AWARENESS: ICD-10-CM

## 2019-05-01 LAB
ANION GAP SERPL CALC-SCNC: 8 MMOL/L — SIGNIFICANT CHANGE UP (ref 5–17)
BUN SERPL-MCNC: 15 MG/DL — SIGNIFICANT CHANGE UP (ref 7–23)
CALCIUM SERPL-MCNC: 9.9 MG/DL — SIGNIFICANT CHANGE UP (ref 8.4–10.5)
CHLORIDE SERPL-SCNC: 103 MMOL/L — SIGNIFICANT CHANGE UP (ref 96–108)
CO2 SERPL-SCNC: 27 MMOL/L — SIGNIFICANT CHANGE UP (ref 22–31)
CREAT SERPL-MCNC: 1.23 MG/DL — SIGNIFICANT CHANGE UP (ref 0.5–1.3)
GLUCOSE SERPL-MCNC: 100 MG/DL — HIGH (ref 70–99)
HCT VFR BLD CALC: 38.6 % — LOW (ref 39–50)
HGB BLD-MCNC: 12 G/DL — LOW (ref 13–17)
MCHC RBC-ENTMCNC: 24.5 PG — LOW (ref 27–34)
MCHC RBC-ENTMCNC: 31.1 GM/DL — LOW (ref 32–36)
MCV RBC AUTO: 78.9 FL — LOW (ref 80–100)
NRBC # BLD: 0 /100 WBCS — SIGNIFICANT CHANGE UP (ref 0–0)
PLATELET # BLD AUTO: 233 K/UL — SIGNIFICANT CHANGE UP (ref 150–400)
POTASSIUM SERPL-MCNC: 3.9 MMOL/L — SIGNIFICANT CHANGE UP (ref 3.5–5.3)
POTASSIUM SERPL-SCNC: 3.9 MMOL/L — SIGNIFICANT CHANGE UP (ref 3.5–5.3)
RBC # BLD: 4.89 M/UL — SIGNIFICANT CHANGE UP (ref 4.2–5.8)
RBC # FLD: 16.2 % — HIGH (ref 10.3–14.5)
SODIUM SERPL-SCNC: 138 MMOL/L — SIGNIFICANT CHANGE UP (ref 135–145)
WBC # BLD: 6.89 K/UL — SIGNIFICANT CHANGE UP (ref 3.8–10.5)
WBC # FLD AUTO: 6.89 K/UL — SIGNIFICANT CHANGE UP (ref 3.8–10.5)

## 2019-05-01 PROCEDURE — 99232 SBSQ HOSP IP/OBS MODERATE 35: CPT

## 2019-05-01 RX ORDER — MIRTAZAPINE 45 MG/1
15 TABLET, ORALLY DISINTEGRATING ORAL
Qty: 0 | Refills: 0 | Status: DISCONTINUED | OUTPATIENT
Start: 2019-05-01 | End: 2019-05-02

## 2019-05-01 RX ORDER — SODIUM CHLORIDE 0.65 %
1 AEROSOL, SPRAY (ML) NASAL EVERY 8 HOURS
Qty: 0 | Refills: 0 | Status: DISCONTINUED | OUTPATIENT
Start: 2019-05-01 | End: 2019-05-03

## 2019-05-01 RX ORDER — CLONAZEPAM 1 MG
0.5 TABLET ORAL
Qty: 0 | Refills: 0 | Status: DISCONTINUED | OUTPATIENT
Start: 2019-05-01 | End: 2019-05-03

## 2019-05-01 RX ADMIN — AMLODIPINE BESYLATE 10 MILLIGRAM(S): 2.5 TABLET ORAL at 05:28

## 2019-05-01 RX ADMIN — HEPARIN SODIUM 5000 UNIT(S): 5000 INJECTION INTRAVENOUS; SUBCUTANEOUS at 05:28

## 2019-05-01 RX ADMIN — Medication 0.5 MILLIGRAM(S): at 17:13

## 2019-05-01 RX ADMIN — Medication 20 MILLIGRAM(S): at 17:14

## 2019-05-01 RX ADMIN — Medication 81 MILLIGRAM(S): at 11:04

## 2019-05-01 RX ADMIN — Medication 20 MILLIGRAM(S): at 05:28

## 2019-05-01 RX ADMIN — Medication 25 MILLIGRAM(S): at 22:16

## 2019-05-01 RX ADMIN — Medication 200 MILLIGRAM(S): at 17:14

## 2019-05-01 RX ADMIN — MIRTAZAPINE 15 MILLIGRAM(S): 45 TABLET, ORALLY DISINTEGRATING ORAL at 17:14

## 2019-05-01 RX ADMIN — Medication 0.25 MILLIGRAM(S): at 08:27

## 2019-05-01 RX ADMIN — Medication 1 SPRAY(S): at 19:15

## 2019-05-01 RX ADMIN — Medication 25 MILLIGRAM(S): at 13:48

## 2019-05-01 RX ADMIN — Medication 200 MILLIGRAM(S): at 05:29

## 2019-05-01 RX ADMIN — Medication 25 MILLIGRAM(S): at 05:28

## 2019-05-01 RX ADMIN — HEPARIN SODIUM 5000 UNIT(S): 5000 INJECTION INTRAVENOUS; SUBCUTANEOUS at 17:23

## 2019-05-01 NOTE — PROGRESS NOTE ADULT - SUBJECTIVE AND OBJECTIVE BOX
CC.  elevated BP     HPI.  Patient is asymptomatic.  Feels better.  Offers no other complaints                Constitutional: No fever, fatigue or weight loss.  Skin: No rash.  Eyes: No recent vision problems or eye pain.  ENT: No congestion, ear pain, or sore throat.  Endocrine: No thyroid problems.  Cardiovascular: No chest pain or palpation.  Respiratory: No cough, shortness of breath, congestion, or wheezing.  Gastrointestinal: No abdominal pain, nausea, vomiting, or diarrhea.  Genitourinary: No dysuria.  Musculoskeletal: No joint swelling.  Neurologic: No headache.      Vital Signs Last 24 Hrs  T(C): 36.7 (01 May 2019 07:33), Max: 37.3 (30 Apr 2019 23:24)  T(F): 98 (01 May 2019 07:33), Max: 99.1 (30 Apr 2019 23:24)  HR: 74 (01 May 2019 07:33) (74 - 97)  BP: 118/74 (01 May 2019 07:33) (115/63 - 161/89)  BP(mean): --  RR: 17 (01 May 2019 07:33) (16 - 18)  SpO2: 97% (01 May 2019 07:33) (96% - 98%)        PHYSICAL EXAM-  GENERAL: NAD, well-groomed, well-developed  HEAD:  Atraumatic, Normocephalic  EYES: EOMI, PERRLA, conjunctiva and sclera clear  NECK: Supple, No JVD, Normal thyroid  NERVOUS SYSTEM:  Alert & Oriented X3, Motor Strength 5/5 B/L upper and lower extremities; DTRs 2+ intact and symmetric  CHEST/LUNG: Clear to percussion bilaterally; No rales, rhonchi, wheezing, or rubs  HEART: Regular rate and rhythm; No murmurs, rubs, or gallops  ABDOMEN: Soft, Nontender, Nondistended; Bowel sounds present  EXTREMITIES:  2+ Peripheral Pulses, No clubbing, cyanosis, or edema  SKIN: No rashes or lesions       MEDICATIONS  (STANDING):  amLODIPine   Tablet 10 milliGRAM(s) Oral daily  aspirin  chewable 81 milliGRAM(s) Oral daily  clonazePAM Tablet 0.25 milliGRAM(s) Oral <User Schedule>  enalapril 20 milliGRAM(s) Oral two times a day  heparin  Injectable 5000 Unit(s) SubCutaneous every 12 hours  hydrALAZINE 25 milliGRAM(s) Oral every 8 hours  labetalol 200 milliGRAM(s) Oral two times a day  mirtazapine 15 milliGRAM(s) Oral at bedtime    MEDICATIONS  (PRN):  acetaminophen   Tablet .. 650 milliGRAM(s) Oral every 6 hours PRN Moderate Pain (4 - 6)  haloperidol    Injectable 2 milliGRAM(s) IntraMuscular every 12 hours PRN agitation  hydrALAZINE Injectable 10 milliGRAM(s) IV Push every 12 hours PRN SBP>180  LORazepam   Injectable 1 milliGRAM(s) IntraMuscular every 12 hours PRN Agitation                              12.0   6.89  )-----------( 233      ( 01 May 2019 07:28 )             38.6     05-01    138  |  103  |  15  ----------------------------<  100<H>  3.9   |  27  |  1.23    Ca    9.9      01 May 2019 07:28                  Imaging Personally Reviewed:     [x ] YES  [ ] NO    Consultant(s) Notes Reviewed:  [x ] YES  [ ] NO    Care Discussed with Consultants/Other Providers [x ] YES  [ ] No medical contraindication for discharge

## 2019-05-01 NOTE — PROGRESS NOTE ADULT - SUBJECTIVE AND OBJECTIVE BOX
Chief Complaint: Shortness of breath    Interval Events: No events overnight. No complaints.    Review of Systems:  General: No fevers, chills, weight loss or gain  Skin: No rashes, color changes  Cardiovascular: No chest pain, orthopnea  Respiratory: No shortness of breath, cough  Gastrointestinal: No nausea, abdominal pain  Genitourinary: No incontinence, pain with urination  Musculoskeletal: No pain, swelling, decreased range of motion  Neurological: No headache, weakness  Psychiatric: No depression, anxiety  Endocrine: No weight loss or gain, increased thirst  All other systems are comprehensively negative.    Physical Exam:  Vital Signs Last 24 Hrs  T(C): 36.7 (01 May 2019 07:33), Max: 37.3 (30 Apr 2019 23:24)  T(F): 98 (01 May 2019 07:33), Max: 99.1 (30 Apr 2019 23:24)  HR: 74 (01 May 2019 07:33) (74 - 97)  BP: 118/74 (01 May 2019 07:33) (115/63 - 161/89)  BP(mean): --  RR: 17 (01 May 2019 07:33) (16 - 18)  SpO2: 97% (01 May 2019 07:33) (96% - 98%)  General: NAD  HEENT: MMM  Neck: No JVD, no carotid bruit  Lungs: CTAB  CV: RRR, nl S1/S2, no M/R/G  Abdomen: S/NT/ND, +BS  Extremities: No LE edema, no cyanosis  Neuro: AAOx3, non-focal  Skin: No rash    Labs:             05-01    138  |  103  |  15  ----------------------------<  100<H>  3.9   |  27  |  1.23    Ca    9.9      01 May 2019 07:28                          12.0   6.89  )-----------( 233      ( 01 May 2019 07:28 )             38.6       Telemetry: Sinus rhythm

## 2019-05-01 NOTE — PROGRESS NOTE ADULT - ASSESSMENT
78 male with    1. Hypertensive urgency:  BP was very high this am , Enalapril iv was given .   SBP goal 140-150s .  Cardiology evaluation appreciated. Recommendation as follows   - Continue enalapril 20 mg bid  - Continue labetalol 100 mg bid  - Start amlodipine 10 mg daily  - Continue hydralazine 25 mg tid  - BP stable  - Echo with normal LV systolic function, no significant valve issues.   Continue with telemonitor  CT brain and CXR unremarkable    2. Anxiety: Xanax prn.  psych recommended safe discharge planning .    3. Hypokalemia: replaced.    4. IMPROVE VTE Individual Risk Assessment          RISK                                                          Points    [  ] Previous VTE                                                3    [  ] Thrombophilia                                             2    [  ] Lower limb paralysis                                    2        (unable to hold up >15 seconds)      [  ] Current Cancer                                             2         (within 6 months)    [  ] Immobilization > 24 hrs                              1    [  ] ICU/CCU stay > 24 hours                            1    [x  ] Age > 60                                                    1    IMPROVE VTE Score 1  Heparin SQ for dvt ppx

## 2019-05-01 NOTE — PROGRESS NOTE ADULT - ASSESSMENT
The patient is a 78 year old male with a history of HTN, HL, depression, anxiety who is admitted with shortness of breath and hypertensive urgency.    Plan:  - Continue enalapril 20 mg bid  - Continue labetalol to 200 mg bid  - Continue amlodipine 10 mg daily  - Continue hydralazine 25 mg tid  - Will attempt to titrate off hydralazine as outpatient  - Echo with normal LV systolic function, no significant valve issues  - Outpatient follow-up for BP control. Follow-up with me in 1-2 weeks.

## 2019-05-02 ENCOUNTER — TRANSCRIPTION ENCOUNTER (OUTPATIENT)
Age: 79
End: 2019-05-02

## 2019-05-02 PROCEDURE — 99232 SBSQ HOSP IP/OBS MODERATE 35: CPT

## 2019-05-02 RX ORDER — MIRTAZAPINE 45 MG/1
30 TABLET, ORALLY DISINTEGRATING ORAL AT BEDTIME
Qty: 0 | Refills: 0 | Status: DISCONTINUED | OUTPATIENT
Start: 2019-05-02 | End: 2019-05-03

## 2019-05-02 RX ADMIN — Medication 200 MILLIGRAM(S): at 05:08

## 2019-05-02 RX ADMIN — Medication 25 MILLIGRAM(S): at 05:08

## 2019-05-02 RX ADMIN — HEPARIN SODIUM 5000 UNIT(S): 5000 INJECTION INTRAVENOUS; SUBCUTANEOUS at 17:03

## 2019-05-02 RX ADMIN — Medication 200 MILLIGRAM(S): at 17:03

## 2019-05-02 RX ADMIN — MIRTAZAPINE 30 MILLIGRAM(S): 45 TABLET, ORALLY DISINTEGRATING ORAL at 21:21

## 2019-05-02 RX ADMIN — HEPARIN SODIUM 5000 UNIT(S): 5000 INJECTION INTRAVENOUS; SUBCUTANEOUS at 05:09

## 2019-05-02 RX ADMIN — Medication 25 MILLIGRAM(S): at 21:21

## 2019-05-02 RX ADMIN — AMLODIPINE BESYLATE 10 MILLIGRAM(S): 2.5 TABLET ORAL at 05:09

## 2019-05-02 RX ADMIN — Medication 81 MILLIGRAM(S): at 11:34

## 2019-05-02 RX ADMIN — Medication 25 MILLIGRAM(S): at 13:56

## 2019-05-02 RX ADMIN — Medication 0.5 MILLIGRAM(S): at 17:05

## 2019-05-02 RX ADMIN — Medication 20 MILLIGRAM(S): at 05:08

## 2019-05-02 RX ADMIN — Medication 20 MILLIGRAM(S): at 17:03

## 2019-05-02 NOTE — PROGRESS NOTE BEHAVIORAL HEALTH - OTHER
euthymic; tearful at times when discussing wife but quickly composed self and was joking with evaluator

## 2019-05-02 NOTE — PROGRESS NOTE BEHAVIORAL HEALTH - NSBHCHARTREVIEWVS_PSY_A_CORE FT
Vital Signs Last 24 Hrs  T(C): 36.4 (30 Apr 2019 08:12), Max: 36.9 (29 Apr 2019 20:00)  T(F): 97.5 (30 Apr 2019 08:12), Max: 98.5 (29 Apr 2019 20:00)  HR: 70 (30 Apr 2019 08:12) (70 - 89)  BP: 116/71 (30 Apr 2019 08:12) (116/71 - 174/95)  BP(mean): --  RR: 16 (30 Apr 2019 08:12) (16 - 16)  SpO2: 96% (30 Apr 2019 09:32) (96% - 100%)
Vital Signs Last 24 Hrs  T(C): 36.6 (02 May 2019 07:41), Max: 37.1 (01 May 2019 23:15)  T(F): 97.8 (02 May 2019 07:41), Max: 98.8 (01 May 2019 23:15)  HR: 74 (02 May 2019 07:41) (74 - 86)  BP: 128/73 (02 May 2019 07:41) (122/66 - 149/80)  BP(mean): --  RR: 16 (02 May 2019 07:41) (16 - 19)  SpO2: 96% (02 May 2019 07:41) (91% - 97%)
Vital Signs Last 24 Hrs  T(C): 36.7 (01 May 2019 07:33), Max: 37.3 (30 Apr 2019 23:24)  T(F): 98 (01 May 2019 07:33), Max: 99.1 (30 Apr 2019 23:24)  HR: 74 (01 May 2019 13:46) (74 - 97)  BP: 122/80 (01 May 2019 13:46) (115/63 - 161/89)  BP(mean): --  RR: 18 (01 May 2019 13:46) (16 - 18)  SpO2: 97% (01 May 2019 07:33) (96% - 98%)

## 2019-05-02 NOTE — PROGRESS NOTE BEHAVIORAL HEALTH - NSBHFUPINTERVALCCFT_PSY_A_CORE
"I'll go home when my son takes me home tomorrow."
"I'll take the medicines if you tell me which ones to take."
"I need to go home."

## 2019-05-02 NOTE — PROGRESS NOTE BEHAVIORAL HEALTH - NSBHFUPINTERVALHXFT_PSY_A_CORE
Pt continues to have some memory problems and has difficulty recalling the names of his grandchildren.  He had difficulty taking his medications prior to coming into the hospital and is now on a more complicated regimen with four different antihypertensives and needs to take two of them twice a day and one of them three times a day.  His son Austin [phone], says he will help care for his father and will come in the afternoon to check on his father's medications as well.
Pt continues to have some memory problems and has difficulty recalling the names of his grandchildren.  He had difficulty taking his medications prior to coming into the hospital and is now on a more complicated regimen with four different antihypertensives and needs to take two of them twice a day and one of them three times a day.  His son Austin [phone], called and said that he understands that pt's neighbor, Madi has been helping by stopping by once a day, but he understands that he will also need to help his father.  His son Austin offered to stop by once a day in the afternoon to check on his father's medications as well.
Pt denies that he needs any help after discharge but appears not to know what medications he needs.  A message was left with his son, Ehsan at [phone].  His brother Austin had said that his brother Ehsan could help earlier when he had spoken with staff.

## 2019-05-02 NOTE — PROGRESS NOTE ADULT - ASSESSMENT
The patient is a 78 year old male with a history of HTN, HL, depression, anxiety who is admitted with shortness of breath and hypertensive urgency.    Plan:  - Continue enalapril 20 mg bid  - Continue labetalol to 200 mg bid  - Continue amlodipine 10 mg daily  - Continue hydralazine 25 mg tid  - Will attempt to titrate off hydralazine as outpatient  - Echo with normal LV systolic function, no significant valve issues  - Discontinue telemetry  - Outpatient follow-up for BP control. Follow-up with me in 1-2 weeks.

## 2019-05-02 NOTE — PROGRESS NOTE BEHAVIORAL HEALTH - NSBHCONSULTMEDS_PSY_A_CORE FT
Begin Remeron 15mg po qhs  Clonazepam 0.25mg po TID
Begin Remeron 15mg po q 6pm  Clonazepam 0.5mg po q 6pm
Begin Remeron 30mg po q 6pm  Clonazepam 0.5mg po q 6pm

## 2019-05-02 NOTE — DISCHARGE NOTE NURSING/CASE MANAGEMENT/SOCIAL WORK - NSDCDPATPORTLINK_GEN_ALL_CORE
You can access the [facility] Patient Portal, offered by [facility], by registering with the following website: [email or web address]

## 2019-05-02 NOTE — DISCHARGE NOTE NURSING/CASE MANAGEMENT/SOCIAL WORK - NSSCCONTNUM_GEN_ALL_CORE
Please contact the home care agency at the above phone number if you have not heard from them by 12 noon on the day after your hospital discharge.

## 2019-05-02 NOTE — DIETITIAN INITIAL EVALUATION ADULT. - OTHER INFO
Pt seen per LOS policy.  Pt is a 79 yo male admitted from home for hypertensive urgency, possibly from poor medication compliance/forgetfullness as pt lives alone/noted to have memory problems.  Pmhx includes: hld, htn, depression (), anxiety.  Pt reports good appetite and intake on DASH/TLC diet throughout week.  Meal changes obtained daily to optimize po intake.  Discussed importance of heart healthy diet, which pt reports he is mindful to follow (no added salt).  Denies recent wt changes (UBW ~170#).  Skin intact.

## 2019-05-02 NOTE — PROGRESS NOTE ADULT - SUBJECTIVE AND OBJECTIVE BOX
Chief Complaint: Shortness of breath    Interval Events: No events overnight. No complaints.    Review of Systems:  General: No fevers, chills, weight loss or gain  Skin: No rashes, color changes  Cardiovascular: No chest pain, orthopnea  Respiratory: No shortness of breath, cough  Gastrointestinal: No nausea, abdominal pain  Genitourinary: No incontinence, pain with urination  Musculoskeletal: No pain, swelling, decreased range of motion  Neurological: No headache, weakness  Psychiatric: No depression, anxiety  Endocrine: No weight loss or gain, increased thirst  All other systems are comprehensively negative.    Physical Exam:  Vital Signs Last 24 Hrs  T(C): 36.6 (02 May 2019 07:41), Max: 37.1 (01 May 2019 23:15)  T(F): 97.8 (02 May 2019 07:41), Max: 98.8 (01 May 2019 23:15)  HR: 74 (02 May 2019 07:41) (74 - 86)  BP: 128/73 (02 May 2019 07:41) (122/66 - 149/80)  BP(mean): --  RR: 16 (02 May 2019 07:41) (16 - 19)  SpO2: 96% (02 May 2019 07:41) (91% - 97%)  General: NAD  HEENT: MMM  Neck: No JVD, no carotid bruit  Lungs: CTAB  CV: RRR, nl S1/S2, no M/R/G  Abdomen: S/NT/ND, +BS  Extremities: No LE edema, no cyanosis  Neuro: AAOx3, non-focal  Skin: No rash    Labs:             05-01    138  |  103  |  15  ----------------------------<  100<H>  3.9   |  27  |  1.23    Ca    9.9      01 May 2019 07:28                          12.0   6.89  )-----------( 233      ( 01 May 2019 07:28 )             38.6     Telemetry: Sinus rhythm

## 2019-05-02 NOTE — DISCHARGE NOTE NURSING/CASE MANAGEMENT/SOCIAL WORK - NSDCFUADDAPPT_GEN_ALL_CORE_FT
Dr Jim Hawkins (psychiatrist)   [phone] may call for an appointment.   [address]  [address]  [place], [place]

## 2019-05-02 NOTE — DISCHARGE NOTE NURSING/CASE MANAGEMENT/SOCIAL WORK - NSSCTYPOFSERV_GEN_ALL_CORE
Nurse to visit the day after hospital discharge; Physical Therapy services; Social Work; Psychiatric Nurse Ariel. Nurse to visit the day after hospital discharge; Other Services to follow: Physical Therapy; Social Work; Psychiatric Nurse.

## 2019-05-02 NOTE — PROGRESS NOTE ADULT - ATTENDING COMMENTS
discussed case with  for discharge planning  family agreed to hire help at home.  to be done on saturday

## 2019-05-02 NOTE — PROGRESS NOTE BEHAVIORAL HEALTH - NSBHCHARTREVIEWLAB_PSY_A_CORE FT
12.2   8.17  )-----------( 224      ( 29 Apr 2019 07:18 )             39.1   04-29    143  |  106  |  11  ----------------------------<  99  4.2   |  30  |  1.10    Ca    9.9      29 Apr 2019 07:18
12.0   6.89  )-----------( 233      ( 01 May 2019 07:28 )             38.6   05-01    138  |  103  |  15  ----------------------------<  100<H>  3.9   |  27  |  1.23    Ca    9.9      01 May 2019 07:28
12.0   6.89  )-----------( 233      ( 01 May 2019 07:28 )             38.6   05-01    138  |  103  |  15  ----------------------------<  100<H>  3.9   |  27  |  1.23    Ca    9.9      01 May 2019 07:28

## 2019-05-02 NOTE — PROGRESS NOTE BEHAVIORAL HEALTH - SUMMARY
Patient is a 77 year-old  male, retired, living alone,  since 2016, with a history of depression since his wife passed away three years ago, with no prior / current suicidal ideation, with no history of suicidality, with no prior in-patient hospitalization, no legal history, no history violence/aggression, no substance abuse history admitted to medicine for uncontrolled hypertension. PMH HLD, HTN.  Psychiatry consulted for depression and anxiety since pt has been taking Xanax 0.25mg every six hours as a prn while hospitalized.     Patient presenting euthymic, reporting periodic depressed mood since the death of his wife. He denies currently feeling depressed. Patient reports being  60 years prior wife passing away and missed her very much.    Collateral obtained from patient’s son, Austin Darnell [phone] previously, who confirmed patient’s current presentation and past psychiatric history. Pt has been referred to Dr Dickson at [facility] outpt is the past. Pt is currently not clear about which medications he needs to take for his HTN and would benefit from help at home in order to take his medications on a regular basis.  His son, Austin recommended that his brother, Ehsan in [place] would be able to help the patient if the patient could stay with him as he did in the past.  Patient needs help in caring for his HTN and would benefit from home services if his children are unable to help him.   Plan:  Begin Remeron 15mg po qhs  Clonazepam 0.25mg po TID  Outpt follow up
Patient is a 77 year-old  male, retired, living alone,  since 2016, with a history of depression since his wife passed away three years ago, with no prior / current suicidal ideation, with no history of suicidality, with no prior in-patient hospitalization, no legal history, no history violence/aggression, no substance abuse history admitted to medicine for uncontrolled hypertension. PMH HLD, HTN.  Psychiatry consulted for depression and anxiety since pt has been taking Xanax 0.25mg every six hours as a prn while hospitalized.     Patient presenting euthymic, reporting periodic depressed mood since the death of his wife. He denies currently feeling depressed. Patient reports being  60 years prior wife passing away and missed her very much.      His son Austin [phone], says he will help care for his father and will come in the afternoon to check on his father's medications as well. Pt is safe for discharge home with help from his son regarding taking medication only.  He is able to prepare his own meals and do his own shopping.     Increased Remeron to 30mg po q6pm  Clonazepam 0.5mg po q 6pm  Outpt follow up with Dr Jim Hawkins in [place] as below.
Patient is a 77 year-old  male, retired, living alone,  since 2016, with a history of depression since his wife passed away three years ago, with no prior / current suicidal ideation, with no history of suicidality, with no prior in-patient hospitalization, no legal history, no history violence/aggression, no substance abuse history admitted to medicine for uncontrolled hypertension. PMH HLD, HTN.  Psychiatry consulted for depression and anxiety since pt has been taking Xanax 0.25mg every six hours as a prn while hospitalized.     Patient presenting euthymic, reporting periodic depressed mood since the death of his wife. He denies currently feeling depressed. Patient reports being  60 years prior wife passing away and missed her very much.    Collateral obtained from patient’s son, Austin Darnell [phone] previously, who confirmed patient’s current presentation and past psychiatric history. Pt has been referred to Dr Dickson at [facility] outpt is the past. Pt is currently not clear about which medications he needs to take for his HTN and would benefit from help at home in order to take his medications on a regular basis.  His son, Austin recommended that his brother, Ehsan in [place] would be able to help the patient if the patient could stay with him as he did in the past.  Patient needs help in caring for his HTN and would benefit from home services if his children are unable to help him.   Plan:  Begin Remeron 15mg po q6pm  Clonazepam 0.5mg po q 6pm  Outpt follow up

## 2019-05-02 NOTE — DISCHARGE NOTE NURSING/CASE MANAGEMENT/SOCIAL WORK - NSDCPNPNATDISSUGG_GEN_ALL_CORE
Patient seen on 12/7/17 and not refilled at that time. Will route to provider to advise.     Anuj Winters RN    
Requested Prescriptions   Pending Prescriptions Disp Refills     levothyroxine (SYNTHROID/LEVOTHROID) 125 MCG tablet [Pharmacy Med Name: LEVOTHYROXINE 125 MCG TABLET]  Last Written Prescription Date:  12/5/2017  Last Fill Quantity: 30 tablet,  # refills: 0   Last Office Visit with [facility], P or [facility] prescribing provider:  12/7/2017  Future Office Visit:      30 tablet 0     Sig: TAKE 1 TABLET (125 MCG) BY MOUTH DAILY [MD NOTES: NEEDS APPOINTMENT AND LABS]    Thyroid Protocol Passed    1/4/2018  1:21 PM       Passed - Patient is 12 years or older       Passed - Recent or future visit with authorizing provider's specialty    Patient had office visit in the last year or has a visit in the next 30 days with authorizing provider.  See chart review.          Passed - Normal TSH on file in past 12 months    Recent Labs   Lab Test  12/07/17   1222   TSH  1.34           Passed - No active pregnancy on record    If patient is pregnant or has had a positive pregnancy test, please check TSH.       Passed - No positive pregnancy test in past 12 months    If patient is pregnant or has had a positive pregnancy test, please check TSH.            
No

## 2019-05-02 NOTE — PROGRESS NOTE BEHAVIORAL HEALTH - NSBHCONSULTFOLLOWAFTERCARE_PSY_A_CORE FT
Pt may f/u with Dr Dickson or see Dr Jim Hawkins in [place] of the Outpt [facility] psych dept at [facility] [phone].     Patient needs help in caring for his HTN and would benefit from home services if his children are unable to help him.
Pt may f/u with Dr Jim Hawkins in [place] [phone] (pt's son has this number)     Patient needs help in caring for his HTN and would benefit from home services if his children are unable to help him.
Pt may f/u with Dr Jim Hawkins in [place] [phone] (pt's son has this number)    Pt is safe for discharge home with help from his son regarding taking medication only.  He is able to prepare his own meals and do his own shopping.

## 2019-05-02 NOTE — PROGRESS NOTE ADULT - SUBJECTIVE AND OBJECTIVE BOX
CC.  elevated BP     HPI.  Patient is asymptomatic.  Feels better.  Offers no other complaints                Constitutional: No fever, fatigue or weight loss.  Skin: No rash.  Eyes: No recent vision problems or eye pain.  ENT: No congestion, ear pain, or sore throat.  Endocrine: No thyroid problems.  Cardiovascular: No chest pain or palpation.  Respiratory: No cough, shortness of breath, congestion, or wheezing.  Gastrointestinal: No abdominal pain, nausea, vomiting, or diarrhea.  Genitourinary: No dysuria.  Musculoskeletal: No joint swelling.  Neurologic: No headache.      Vital Signs Last 24 Hrs  T(C): 36.6 (02 May 2019 07:41), Max: 37.1 (01 May 2019 23:15)  T(F): 97.8 (02 May 2019 07:41), Max: 98.8 (01 May 2019 23:15)  HR: 74 (02 May 2019 07:41) (74 - 86)  BP: 128/73 (02 May 2019 07:41) (122/66 - 149/80)  BP(mean): --  RR: 16 (02 May 2019 07:41) (16 - 19)  SpO2: 96% (02 May 2019 07:41) (91% - 97%)        PHYSICAL EXAM-  GENERAL: NAD, well-groomed, well-developed  HEAD:  Atraumatic, Normocephalic  EYES: EOMI, PERRLA, conjunctiva and sclera clear  NECK: Supple, No JVD, Normal thyroid  NERVOUS SYSTEM:  Alert & Oriented X3, Motor Strength 5/5 B/L upper and lower extremities; DTRs 2+ intact and symmetric  CHEST/LUNG: Clear to percussion bilaterally; No rales, rhonchi, wheezing, or rubs  HEART: Regular rate and rhythm; No murmurs, rubs, or gallops  ABDOMEN: Soft, Nontender, Nondistended; Bowel sounds present  EXTREMITIES:  2+ Peripheral Pulses, No clubbing, cyanosis, or edema  SKIN: No rashes or lesions        MEDICATIONS  (STANDING):  amLODIPine   Tablet 10 milliGRAM(s) Oral daily  aspirin  chewable 81 milliGRAM(s) Oral daily  clonazePAM Tablet 0.5 milliGRAM(s) Oral <User Schedule>  enalapril 20 milliGRAM(s) Oral two times a day  heparin  Injectable 5000 Unit(s) SubCutaneous every 12 hours  hydrALAZINE 25 milliGRAM(s) Oral every 8 hours  labetalol 200 milliGRAM(s) Oral two times a day  mirtazapine 15 milliGRAM(s) Oral <User Schedule>    MEDICATIONS  (PRN):  acetaminophen   Tablet .. 650 milliGRAM(s) Oral every 6 hours PRN Moderate Pain (4 - 6)  haloperidol    Injectable 2 milliGRAM(s) IntraMuscular every 12 hours PRN agitation  hydrALAZINE Injectable 10 milliGRAM(s) IV Push every 12 hours PRN SBP>180  LORazepam   Injectable 1 milliGRAM(s) IntraMuscular every 12 hours PRN Agitation  sodium chloride 0.65% Nasal 1 Spray(s) Both Nostrils every 8 hours PRN Nasal Congestion                              12.0   6.89  )-----------( 233      ( 01 May 2019 07:28 )             38.6     05-01    138  |  103  |  15  ----------------------------<  100<H>  3.9   |  27  |  1.23    Ca    9.9      01 May 2019 07:28                Imaging Personally Reviewed:     [x ] YES  [ ] NO    Consultant(s) Notes Reviewed:  [x ] YES  [ ] NO    Care Discussed with Consultants/Other Providers [x ] YES  [ ] No medical contraindication for discharge

## 2019-05-03 VITALS
RESPIRATION RATE: 16 BRPM | DIASTOLIC BLOOD PRESSURE: 63 MMHG | TEMPERATURE: 98 F | SYSTOLIC BLOOD PRESSURE: 106 MMHG | OXYGEN SATURATION: 96 % | HEART RATE: 76 BPM

## 2019-05-03 PROCEDURE — 83690 ASSAY OF LIPASE: CPT

## 2019-05-03 PROCEDURE — 93005 ELECTROCARDIOGRAM TRACING: CPT

## 2019-05-03 PROCEDURE — 84484 ASSAY OF TROPONIN QUANT: CPT

## 2019-05-03 PROCEDURE — 82550 ASSAY OF CK (CPK): CPT

## 2019-05-03 PROCEDURE — 99239 HOSP IP/OBS DSCHRG MGMT >30: CPT

## 2019-05-03 PROCEDURE — 80048 BASIC METABOLIC PNL TOTAL CA: CPT

## 2019-05-03 PROCEDURE — 36415 COLL VENOUS BLD VENIPUNCTURE: CPT

## 2019-05-03 PROCEDURE — 99285 EMERGENCY DEPT VISIT HI MDM: CPT | Mod: 25

## 2019-05-03 PROCEDURE — 80053 COMPREHEN METABOLIC PANEL: CPT

## 2019-05-03 PROCEDURE — 96374 THER/PROPH/DIAG INJ IV PUSH: CPT

## 2019-05-03 PROCEDURE — 97161 PT EVAL LOW COMPLEX 20 MIN: CPT

## 2019-05-03 PROCEDURE — 93306 TTE W/DOPPLER COMPLETE: CPT

## 2019-05-03 PROCEDURE — 85027 COMPLETE CBC AUTOMATED: CPT

## 2019-05-03 PROCEDURE — 70450 CT HEAD/BRAIN W/O DYE: CPT

## 2019-05-03 PROCEDURE — 71045 X-RAY EXAM CHEST 1 VIEW: CPT

## 2019-05-03 PROCEDURE — 97116 GAIT TRAINING THERAPY: CPT

## 2019-05-03 RX ORDER — CLONAZEPAM 1 MG
1 TABLET ORAL
Qty: 30 | Refills: 0
Start: 2019-05-03 | End: 2019-06-01

## 2019-05-03 RX ORDER — MIRTAZAPINE 45 MG/1
1 TABLET, ORALLY DISINTEGRATING ORAL
Qty: 30 | Refills: 0
Start: 2019-05-03 | End: 2019-06-01

## 2019-05-03 RX ADMIN — Medication 81 MILLIGRAM(S): at 08:14

## 2019-05-03 RX ADMIN — HEPARIN SODIUM 5000 UNIT(S): 5000 INJECTION INTRAVENOUS; SUBCUTANEOUS at 05:54

## 2019-05-03 RX ADMIN — Medication 25 MILLIGRAM(S): at 05:55

## 2019-05-03 RX ADMIN — Medication 20 MILLIGRAM(S): at 05:55

## 2019-05-03 RX ADMIN — Medication 200 MILLIGRAM(S): at 05:55

## 2019-05-03 RX ADMIN — AMLODIPINE BESYLATE 10 MILLIGRAM(S): 2.5 TABLET ORAL at 05:55

## 2019-05-03 NOTE — PROGRESS NOTE ADULT - SUBJECTIVE AND OBJECTIVE BOX
Chief Complaint: Shortness of breath    Interval Events: No events overnight. No complaints.    Review of Systems:  General: No fevers, chills, weight loss or gain  Skin: No rashes, color changes  Cardiovascular: No chest pain, orthopnea  Respiratory: No shortness of breath, cough  Gastrointestinal: No nausea, abdominal pain  Genitourinary: No incontinence, pain with urination  Musculoskeletal: No pain, swelling, decreased range of motion  Neurological: No headache, weakness  Psychiatric: No depression, anxiety  Endocrine: No weight loss or gain, increased thirst  All other systems are comprehensively negative.    Physical Exam:  Vital Signs Last 24 Hrs  T(C): 36.4 (03 May 2019 07:40), Max: 36.7 (02 May 2019 15:27)  T(F): 97.6 (03 May 2019 07:40), Max: 98.1 (02 May 2019 15:27)  HR: 76 (03 May 2019 07:40) (76 - 89)  BP: 106/63 (03 May 2019 07:40) (106/63 - 155/81)  BP(mean): --  RR: 16 (03 May 2019 07:40) (16 - 18)  SpO2: 96% (03 May 2019 07:40) (92% - 96%)  General: NAD  HEENT: MMM  Neck: No JVD, no carotid bruit  Lungs: CTAB  CV: RRR, nl S1/S2, no M/R/G  Abdomen: S/NT/ND, +BS  Extremities: No LE edema, no cyanosis  Neuro: AAOx3, non-focal  Skin: No rash    Labs:

## 2019-05-03 NOTE — PROGRESS NOTE ADULT - SUBJECTIVE AND OBJECTIVE BOX
CC.  elevated BP     HPI.  Patient is asymptomatic.  Feels better.  Offers no other complaints                Constitutional: No fever, fatigue or weight loss.  Skin: No rash.  Eyes: No recent vision problems or eye pain.  ENT: No congestion, ear pain, or sore throat.  Endocrine: No thyroid problems.  Cardiovascular: No chest pain or palpation.  Respiratory: No cough, shortness of breath, congestion, or wheezing.  Gastrointestinal: No abdominal pain, nausea, vomiting, or diarrhea.  Genitourinary: No dysuria.  Musculoskeletal: No joint swelling.  Neurologic: No headache.    Vital Signs Last 24 Hrs  T(C): 36.4 (03 May 2019 07:40), Max: 36.7 (02 May 2019 15:27)  T(F): 97.6 (03 May 2019 07:40), Max: 98.1 (02 May 2019 15:27)  HR: 76 (03 May 2019 07:40) (76 - 89)  BP: 106/63 (03 May 2019 07:40) (106/63 - 155/81)  BP(mean): --  RR: 16 (03 May 2019 07:40) (16 - 18)  SpO2: 96% (03 May 2019 07:40) (92% - 96%)        PHYSICAL EXAM-  GENERAL: NAD, well-groomed, well-developed  HEAD:  Atraumatic, Normocephalic  EYES: EOMI, PERRLA, conjunctiva and sclera clear  NECK: Supple, No JVD, Normal thyroid  NERVOUS SYSTEM:  Alert & Oriented X3, Motor Strength 5/5 B/L upper and lower extremities; DTRs 2+ intact and symmetric  CHEST/LUNG: Clear to percussion bilaterally; No rales, rhonchi, wheezing, or rubs  HEART: Regular rate and rhythm; No murmurs, rubs, or gallops  ABDOMEN: Soft, Nontender, Nondistended; Bowel sounds present  EXTREMITIES:  2+ Peripheral Pulses, No clubbing, cyanosis, or edema  SKIN: No rashes or lesions        MEDICATIONS  (STANDING):  amLODIPine   Tablet 10 milliGRAM(s) Oral daily  aspirin  chewable 81 milliGRAM(s) Oral daily  clonazePAM Tablet 0.5 milliGRAM(s) Oral <User Schedule>  enalapril 20 milliGRAM(s) Oral two times a day  heparin  Injectable 5000 Unit(s) SubCutaneous every 12 hours  hydrALAZINE 25 milliGRAM(s) Oral every 8 hours  labetalol 200 milliGRAM(s) Oral two times a day  mirtazapine 15 milliGRAM(s) Oral <User Schedule>    MEDICATIONS  (PRN):  acetaminophen   Tablet .. 650 milliGRAM(s) Oral every 6 hours PRN Moderate Pain (4 - 6)  haloperidol    Injectable 2 milliGRAM(s) IntraMuscular every 12 hours PRN agitation  hydrALAZINE Injectable 10 milliGRAM(s) IV Push every 12 hours PRN SBP>180  LORazepam   Injectable 1 milliGRAM(s) IntraMuscular every 12 hours PRN Agitation  sodium chloride 0.65% Nasal 1 Spray(s) Both Nostrils every 8 hours PRN Nasal Congestion                                Imaging Personally Reviewed:     [x ] YES  [ ] NO    Consultant(s) Notes Reviewed:  [x ] YES  [ ] NO    Care Discussed with Consultants/Other Providers [x ] YES  [ ] No medical contraindication for discharge

## 2019-05-03 NOTE — PROGRESS NOTE ADULT - ASSESSMENT
78 male with    1. Hypertensive urgency:  BP was very high this am , Enalapril iv was given .   SBP goal 140-150s .  Cardiology evaluation appreciated. Recommendation as follows   - Continue enalapril 20 mg bid  - Continue labetalol 100 mg bid  - Start amlodipine 10 mg daily  - Continue hydralazine 25 mg tid  - BP stable  - Echo with normal LV systolic function, no significant valve issues.   Continue with telemonitor  CT brain and CXR unremarkable    2. Anxiety: Xanax prn.  psych recommended safe discharge planning .  Son will take care of patient, and hire extra help if needed    3. Hypokalemia: replaced.

## 2019-05-07 ENCOUNTER — APPOINTMENT (OUTPATIENT)
Dept: PSYCHIATRY | Facility: CLINIC | Age: 79
End: 2019-05-07

## 2019-06-06 ENCOUNTER — EMERGENCY (EMERGENCY)
Facility: HOSPITAL | Age: 79
LOS: 1 days | Discharge: HOME | End: 2019-06-06
Attending: EMERGENCY MEDICINE | Admitting: EMERGENCY MEDICINE
Payer: MEDICARE

## 2019-06-06 VITALS
SYSTOLIC BLOOD PRESSURE: 170 MMHG | RESPIRATION RATE: 16 BRPM | DIASTOLIC BLOOD PRESSURE: 90 MMHG | TEMPERATURE: 98 F | OXYGEN SATURATION: 98 % | HEART RATE: 77 BPM

## 2019-06-06 VITALS
SYSTOLIC BLOOD PRESSURE: 166 MMHG | HEIGHT: 71 IN | WEIGHT: 160.06 LBS | OXYGEN SATURATION: 96 % | RESPIRATION RATE: 18 BRPM | DIASTOLIC BLOOD PRESSURE: 103 MMHG | HEART RATE: 88 BPM | TEMPERATURE: 99 F

## 2019-06-06 DIAGNOSIS — R06.02 SHORTNESS OF BREATH: ICD-10-CM

## 2019-06-06 LAB
ALBUMIN SERPL ELPH-MCNC: 3.5 G/DL — SIGNIFICANT CHANGE UP (ref 3.3–5)
ALP SERPL-CCNC: 120 U/L — SIGNIFICANT CHANGE UP (ref 30–120)
ALT FLD-CCNC: 28 U/L DA — SIGNIFICANT CHANGE UP (ref 10–60)
ANION GAP SERPL CALC-SCNC: 9 MMOL/L — SIGNIFICANT CHANGE UP (ref 5–17)
AST SERPL-CCNC: 23 U/L — SIGNIFICANT CHANGE UP (ref 10–40)
BASOPHILS # BLD AUTO: 0.05 K/UL — SIGNIFICANT CHANGE UP (ref 0–0.2)
BASOPHILS NFR BLD AUTO: 0.7 % — SIGNIFICANT CHANGE UP (ref 0–2)
BILIRUB SERPL-MCNC: 0.3 MG/DL — SIGNIFICANT CHANGE UP (ref 0.2–1.2)
BUN SERPL-MCNC: 13 MG/DL — SIGNIFICANT CHANGE UP (ref 7–23)
CALCIUM SERPL-MCNC: 9.8 MG/DL — SIGNIFICANT CHANGE UP (ref 8.4–10.5)
CHLORIDE SERPL-SCNC: 104 MMOL/L — SIGNIFICANT CHANGE UP (ref 96–108)
CK SERPL-CCNC: 126 U/L — SIGNIFICANT CHANGE UP (ref 39–308)
CO2 SERPL-SCNC: 26 MMOL/L — SIGNIFICANT CHANGE UP (ref 22–31)
CREAT SERPL-MCNC: 1.11 MG/DL — SIGNIFICANT CHANGE UP (ref 0.5–1.3)
D DIMER BLD IA.RAPID-MCNC: 433 NG/ML DDU — HIGH
EOSINOPHIL # BLD AUTO: 0.17 K/UL — SIGNIFICANT CHANGE UP (ref 0–0.5)
EOSINOPHIL NFR BLD AUTO: 2.3 % — SIGNIFICANT CHANGE UP (ref 0–6)
GLUCOSE SERPL-MCNC: 98 MG/DL — SIGNIFICANT CHANGE UP (ref 70–99)
HCT VFR BLD CALC: 35.8 % — LOW (ref 39–50)
HGB BLD-MCNC: 11.5 G/DL — LOW (ref 13–17)
IMM GRANULOCYTES NFR BLD AUTO: 0.3 % — SIGNIFICANT CHANGE UP (ref 0–1.5)
LYMPHOCYTES # BLD AUTO: 1.8 K/UL — SIGNIFICANT CHANGE UP (ref 1–3.3)
LYMPHOCYTES # BLD AUTO: 24 % — SIGNIFICANT CHANGE UP (ref 13–44)
MCHC RBC-ENTMCNC: 24.9 PG — LOW (ref 27–34)
MCHC RBC-ENTMCNC: 32.1 GM/DL — SIGNIFICANT CHANGE UP (ref 32–36)
MCV RBC AUTO: 77.7 FL — LOW (ref 80–100)
MONOCYTES # BLD AUTO: 0.62 K/UL — SIGNIFICANT CHANGE UP (ref 0–0.9)
MONOCYTES NFR BLD AUTO: 8.3 % — SIGNIFICANT CHANGE UP (ref 2–14)
NEUTROPHILS # BLD AUTO: 4.85 K/UL — SIGNIFICANT CHANGE UP (ref 1.8–7.4)
NEUTROPHILS NFR BLD AUTO: 64.4 % — SIGNIFICANT CHANGE UP (ref 43–77)
NRBC # BLD: 0 /100 WBCS — SIGNIFICANT CHANGE UP (ref 0–0)
NT-PROBNP SERPL-SCNC: 200 PG/ML — SIGNIFICANT CHANGE UP (ref 0–450)
PLATELET # BLD AUTO: 255 K/UL — SIGNIFICANT CHANGE UP (ref 150–400)
POTASSIUM SERPL-MCNC: 3.6 MMOL/L — SIGNIFICANT CHANGE UP (ref 3.5–5.3)
POTASSIUM SERPL-SCNC: 3.6 MMOL/L — SIGNIFICANT CHANGE UP (ref 3.5–5.3)
PROT SERPL-MCNC: 6.6 G/DL — SIGNIFICANT CHANGE UP (ref 6–8.3)
RBC # BLD: 4.61 M/UL — SIGNIFICANT CHANGE UP (ref 4.2–5.8)
RBC # FLD: 16 % — HIGH (ref 10.3–14.5)
SODIUM SERPL-SCNC: 139 MMOL/L — SIGNIFICANT CHANGE UP (ref 135–145)
TROPONIN I SERPL-MCNC: 0 NG/ML — LOW (ref 0.02–0.06)
WBC # BLD: 7.51 K/UL — SIGNIFICANT CHANGE UP (ref 3.8–10.5)
WBC # FLD AUTO: 7.51 K/UL — SIGNIFICANT CHANGE UP (ref 3.8–10.5)

## 2019-06-06 PROCEDURE — 85379 FIBRIN DEGRADATION QUANT: CPT

## 2019-06-06 PROCEDURE — 71046 X-RAY EXAM CHEST 2 VIEWS: CPT

## 2019-06-06 PROCEDURE — 83880 ASSAY OF NATRIURETIC PEPTIDE: CPT

## 2019-06-06 PROCEDURE — 99285 EMERGENCY DEPT VISIT HI MDM: CPT

## 2019-06-06 PROCEDURE — 93010 ELECTROCARDIOGRAM REPORT: CPT

## 2019-06-06 PROCEDURE — 71046 X-RAY EXAM CHEST 2 VIEWS: CPT | Mod: 26

## 2019-06-06 PROCEDURE — 85027 COMPLETE CBC AUTOMATED: CPT

## 2019-06-06 PROCEDURE — 36415 COLL VENOUS BLD VENIPUNCTURE: CPT

## 2019-06-06 PROCEDURE — 80053 COMPREHEN METABOLIC PANEL: CPT

## 2019-06-06 PROCEDURE — 71275 CT ANGIOGRAPHY CHEST: CPT | Mod: 26

## 2019-06-06 PROCEDURE — 99284 EMERGENCY DEPT VISIT MOD MDM: CPT | Mod: 25

## 2019-06-06 PROCEDURE — 71275 CT ANGIOGRAPHY CHEST: CPT

## 2019-06-06 PROCEDURE — 93005 ELECTROCARDIOGRAM TRACING: CPT

## 2019-06-06 PROCEDURE — 94640 AIRWAY INHALATION TREATMENT: CPT

## 2019-06-06 PROCEDURE — 84484 ASSAY OF TROPONIN QUANT: CPT

## 2019-06-06 PROCEDURE — 82550 ASSAY OF CK (CPK): CPT

## 2019-06-06 RX ORDER — IPRATROPIUM/ALBUTEROL SULFATE 18-103MCG
3 AEROSOL WITH ADAPTER (GRAM) INHALATION EVERY 6 HOURS
Refills: 0 | Status: DISCONTINUED | OUTPATIENT
Start: 2019-06-06 | End: 2019-06-10

## 2019-06-06 RX ORDER — SODIUM CHLORIDE 9 MG/ML
1000 INJECTION INTRAMUSCULAR; INTRAVENOUS; SUBCUTANEOUS ONCE
Refills: 0 | Status: COMPLETED | OUTPATIENT
Start: 2019-06-06 | End: 2019-06-06

## 2019-06-06 RX ADMIN — Medication 3 MILLILITER(S): at 16:18

## 2019-06-06 RX ADMIN — SODIUM CHLORIDE 1000 MILLILITER(S): 9 INJECTION INTRAMUSCULAR; INTRAVENOUS; SUBCUTANEOUS at 17:00

## 2019-06-06 RX ADMIN — SODIUM CHLORIDE 1000 MILLILITER(S): 9 INJECTION INTRAMUSCULAR; INTRAVENOUS; SUBCUTANEOUS at 18:00

## 2019-06-06 RX ADMIN — Medication 3 MILLILITER(S): at 18:37

## 2019-06-06 NOTE — ED PROVIDER NOTE - CARE PROVIDER_API CALL
Chente Ca)  Critical Care Medicine; Internal Medicine; Pulmonary Disease  [address] [place] [address]  Phone: [phone]  Fax: [phone]  Follow Up Time:

## 2019-06-06 NOTE — ED PROVIDER NOTE - GASTROINTESTINAL, MLM
normoactive bowel, abdomen is soft, non-tender to palpation, no rebound tenderness or guarding noted, no CVA tenderness noted.

## 2019-06-06 NOTE — ED ADULT NURSE NOTE - PAIN: PRESENCE, MLM
Detail Level: Simple
Other (Free Text): Patient has scattered white papules within foci of lichen sclerosus. She has a past history of VIN2. These lesions may represent condyloma or TIFFANY. Patient to follow up with Gyn for biopsies of lesions on the right vulva.
Note Text (......Xxx Chief Complaint.): This diagnosis correlates with the
denies pain/discomfort

## 2019-06-06 NOTE — ED ADULT NURSE NOTE - NSIMPLEMENTINTERV_GEN_ALL_ED
Implemented All Universal Safety Interventions:  [place] to call system. Call bell, personal items and telephone within reach. Instruct patient to call for assistance. Room bathroom lighting operational. Non-slip footwear when patient is off stretcher. Physically safe environment: no spills, clutter or unnecessary equipment. Stretcher in lowest position, wheels locked, appropriate side rails in place.

## 2019-06-06 NOTE — ED PROVIDER NOTE - OBJECTIVE STATEMENT
77 y/o male, comes in c/o SOB which started two days ago.  pt denies any pain, denies any sick contacts or recent travel, did not take any meds to alleviate symptoms.  pt states that he was sitting down when symptoms started, denies any CP, denies any n/v/d/dizziness.  pt is a poor historian.  pt lives alone

## 2019-06-06 NOTE — ED PROVIDER NOTE - CHPI ED SYMPTOMS NEG
no decreased eating/drinking/no tingling/no fever/no dizziness/no chills/no nausea/no numbness/no pain

## 2019-06-06 NOTE — CONSULT NOTE ADULT - SUBJECTIVE AND OBJECTIVE BOX
Date/Time Patient Seen:  		  Referring MD:   Data Reviewed	       Patient is a 78y old  Male who presents with a chief complaint of     Subjective/HPI    in bed  seen and examined  depressed  vs and meds reviewed  ct chest reviewed    ER provider note reviewed    ED Provider Note [Charted Location: [place]  ED] [Authored: 06-Jun-2019 15:57]- for Visit: [id number], Incomplete, Not Revised, Signed in Full, General    HISTORY OF PRESENT ILLNESS:    High Risk Travel:  International Travel? No(1)     Preferred Language to Address Healthcare:  · Preferred Language to Address Healthcare	English     Child Abuse Assessment (patients less than 13 yrs):  Chief Complaint: shortness of breath.    · Chief Complaint: The patient is a 78y Male complaining of shortness of breath.  · HPI Objective Statement: 77 y/o male, comes in c/o SOB which started two days ago.  pt denies any pain, denies any sick contacts or recent travel, did not take any meds to alleviate symptoms.  pt states that he was sitting down when symptoms started, denies any CP, denies any n/v/d/dizziness.  pt is a poor historian.  pt lives alone  · Presenting Symptoms: SHORTNESS OF BREATH  · Negative Findings: no chills, no decreased eating/drinking, no dizziness, no fever, no nausea, no numbness, no pain, no tingling  · Timing: gradual onset  · Duration: day(s)  two  · Severity: MILD  · Aggravating Factors: nothing  · Relieving Factors: nothing    ex smoker  lives alone  family hx - HTN       PAST MEDICAL & SURGICAL HISTORY:  Anxiety  HLD (hyperlipidemia)  HTN (hypertension)  No significant past surgical history        Medication list         MEDICATIONS  (STANDING):  ALBUTerol/ipratropium for Nebulization 3 milliLiter(s) Nebulizer every 6 hours    MEDICATIONS  (PRN):         Vitals log        ICU Vital Signs Last 24 Hrs  T(C): 36.8 (06 Jun 2019 19:21), Max: 37.2 (06 Jun 2019 15:37)  T(F): 98.3 (06 Jun 2019 19:21), Max: 98.9 (06 Jun 2019 15:37)  HR: 77 (06 Jun 2019 19:21) (72 - 88)  BP: 169/87 (06 Jun 2019 19:21) (166/103 - 169/87)  BP(mean): --  ABP: --  ABP(mean): --  RR: 16 (06 Jun 2019 19:21) (16 - 18)  SpO2: 98% (06 Jun 2019 19:21) (96% - 98%)           Input and Output:  I&O's Detail      Lab Data                        11.5   7.51  )-----------( 255      ( 06 Jun 2019 16:11 )             35.8     06-06    139  |  104  |  13  ----------------------------<  98  3.6   |  26  |  1.11    Ca    9.8      06 Jun 2019 16:11    TPro  6.6  /  Alb  3.5  /  TBili  0.3  /  DBili  x   /  AST  23  /  ALT  28  /  AlkPhos  120  06-06      CARDIAC MARKERS ( 06 Jun 2019 16:11 )  .000 ng/mL / x     / 126 U/L / x     / x            Review of Systems	  depressed      Objective     Physical Examination    heart s1s2  lung dec BS  abd soft  on room air  depressed  flat affect      Pertinent Lab findings & Imaging      [place]:  NO   Adequate UO     I&O's Detail           Discussed with:     Cultures:	        Radiology    EXAM:  CT ANGIO CHEST (W)AW IC                                  PROCEDURE DATE:  06/06/2019          INTERPRETATION:  CLINICAL HISTORY:  Shortness of breath. Evaluate for   pulmonary embolism.    Multiple axial images of the chest were obtained from the lung apices   through upper abdomen with the administration of intravascular contrast.   86cc of Omnipaque 350 was administered intravenously without   complication. Reformatted coronal and sagittal images are submitted. MIP   images were created utilizing dedicated computer software.    COMPARISON: 11/11/2018    FINDINGS: No filling defects are identified within the segmental   pulmonary arterial tree to indicate pulmonary embolism.    No abnormally enlarged mediastinal or hilar lymph nodes are noted. There   is no evidence for axillary lymphadenopathy. The heart size appears   within normal limits. No pericardial effusion is identified. Thoracic   aortic caliber demonstrates unremarkable caliber.    The central bronchial anatomy appears patent. Stable soft tissue   nodularity is identified along the anterior wall of the trachea.    Emphysematous changes are identified bilaterally, right greater than   left. There is no evidence for lung parenchymal infiltrate, consolidation   or suspicious mass lesion. Stable less than 3 mm nodules are identified   within the periphery of the right middle lobe, left upper lobe and left   lower lobe. There is no evidence for pleural effusion or pneumothorax. No   mediastinal shift is noted.    Imaging of the upper abdomen demonstrates an unremarkable appearance to   the visualized portions of the bilateral adrenal glands. Degenerative   changes of the thoracic spine noted.    IMPRESSION:  No filling defects identified within the segmental pulmonary   arterial tree to indicate pulmonary embolism. No evidence for interval   development of focal infiltrate or consolidation. No pleural effusion   identified. Stable soft tissue nodularity identified along the anterior   wall of the trachea.                    MENG SEARS   This document has been electronically signed. Jun 6 2019  6:31PM            EXAM:  US TTE W DOPPLER COMPLETE                                  PROCEDURE DATE:  04/26/2019          INTERPRETATION:  Ordering Physician: HAYLEE GLEASON [id number]    Indication: Shortness of breath, hypertension    Technician: BRIGID    Study Quality: Technically difficult   A complete echocardiographic study was performed utilizing standard   protocol including spectral and color Doppler in all echocardiographic   windows.    Height: 177 cm  Weight: 77 kg  BSA: 1.95 m2  Blood Pressure: 180/103 mmHg    MEASUREMENTS  IVS: 1.0 cm  PWT: 1.2 cm  LA: 4.9 cm  AO: 3.1 cm  LVIDd: 5.2 cm  LVIDs: 2.1 cm    LVEF: 65%  RVSP: 31 mmHg  RA Pressure: 3 mmHg    FINDINGS  Left Ventricle: The left ventricle is normal in size. Increased left   ventricular wall thickness. The left ventricular systolic function is   normal with no wall motion abnormalities. Estimated EF is 65%.  Right Ventricle: The right ventricle is normal in size and function.  Left Atrium: The left atrium is dilated.  Right Atrium: The right atrium is normal in size.  Mitral Valve: The mitral valve is structurally normal. Mild to moderate   mitral regurgitation.  Aortic Valve: Aortic valve sclerosis. Mild to moderate aortic   regurgitation.  Tricuspid Valve: The tricuspid valve is structurally normal. Mild   tricuspid regurgitation. Estimated pulmonary artery systolic pressure is   31 mmHg.  Pulmonic Valve: The pulmonic valve is not well-visualized.  Diastolic Function: Impaired relaxation secondary to age.  Pericardium/Pleura: No pericardial effusion visualized.  Aorta: The aortic root is normal in size.    CONCLUSIONS:  1. Normal left ventricular systolic function.  2. Increased left ventricular wall thickness.  3. Dilated left atrium.                    SEAN CERRATO   This document has been electronically signed. Apr 27 2019  9:58AM

## 2019-06-06 NOTE — CONSULT NOTE ADULT - PROBLEM SELECTOR RECOMMENDATION 9
sob  ordonez  anxiety hx  emphysema on CT  no PE  TTE and CXR and CT reviewed with the patient  reassurance provided  cvs regimen as per home regimen  follow up with PMD  needs outpatient PFT - non urgent  no acute pulm issues identified on this exam  discussed with pt importance of follow up  proventil 2 puff for sob and or wheezing PRN for this pt with Emphysema / COPD

## 2019-06-06 NOTE — ED PROVIDER NOTE - MUSCULOSKELETAL, MLM
Spine appears normal, range of motion is not limited, no muscle or joint tenderness, no edema noted to bilateral lower extremities.

## 2019-06-06 NOTE — ED PROVIDER NOTE - PROGRESS NOTE DETAILS
Wilman AS for Dr. Medrano: 79 y/o male with a PMHx of HTN, HLD, Anxiety presents to the ED c/o intermittent SOB for the past two days. Pt also feeling anxious as well. Pt with negative CTA, troponin normal, EKG normal. Will discharge and have pt Follow up with Dr. Cobb. pt is currently comfortable in ED, in no distress, was seen by pulmonary who cleared pt for discharge, Discussed with patient results of work up, strict return precautions, and need for follow up.  Patient expressed understanding and agrees with plan.  Patient informed to return to the ER immediately for any problems, concerns, or recurrent/worsening symptoms.

## 2019-06-07 ENCOUNTER — EMERGENCY (EMERGENCY)
Facility: HOSPITAL | Age: 79
LOS: 1 days | Discharge: HOME | End: 2019-06-07
Attending: EMERGENCY MEDICINE | Admitting: EMERGENCY MEDICINE
Payer: MEDICARE

## 2019-06-07 VITALS
HEIGHT: 71 IN | OXYGEN SATURATION: 99 % | SYSTOLIC BLOOD PRESSURE: 180 MMHG | TEMPERATURE: 98 F | HEART RATE: 84 BPM | WEIGHT: 160.06 LBS | RESPIRATION RATE: 20 BRPM | DIASTOLIC BLOOD PRESSURE: 100 MMHG

## 2019-06-07 VITALS
TEMPERATURE: 98 F | HEART RATE: 79 BPM | DIASTOLIC BLOOD PRESSURE: 95 MMHG | RESPIRATION RATE: 16 BRPM | OXYGEN SATURATION: 98 % | SYSTOLIC BLOOD PRESSURE: 188 MMHG

## 2019-06-07 PROBLEM — F41.9 ANXIETY DISORDER, UNSPECIFIED: Chronic | Status: ACTIVE | Noted: 2019-04-26

## 2019-06-07 PROCEDURE — 94640 AIRWAY INHALATION TREATMENT: CPT

## 2019-06-07 PROCEDURE — 99283 EMERGENCY DEPT VISIT LOW MDM: CPT

## 2019-06-07 PROCEDURE — 99283 EMERGENCY DEPT VISIT LOW MDM: CPT | Mod: 25

## 2019-06-07 RX ORDER — ALPRAZOLAM 0.25 MG
0.5 TABLET ORAL ONCE
Refills: 0 | Status: DISCONTINUED | OUTPATIENT
Start: 2019-06-07 | End: 2019-06-07

## 2019-06-07 RX ORDER — IPRATROPIUM/ALBUTEROL SULFATE 18-103MCG
1 AEROSOL WITH ADAPTER (GRAM) INHALATION
Refills: 0 | Status: DISCONTINUED | OUTPATIENT
Start: 2019-06-07 | End: 2019-06-11

## 2019-06-07 RX ADMIN — Medication 0.5 MILLIGRAM(S): at 11:36

## 2019-06-07 RX ADMIN — Medication 1 PUFF(S): at 11:39

## 2019-06-07 NOTE — ED ADULT NURSE NOTE - EENT WDL
Eyes with no visual disturbances.  Ears clean and dry and no hearing difficulties. Nose with pink mucosa and no drainage.  Mouth mucous membranes moist and pink.  No tenderness or swelling to throat or neck.
ACE inhibitor-aggravated angioedema

## 2019-06-07 NOTE — ED ADULT NURSE NOTE - NSIMPLEMENTINTERV_GEN_ALL_ED
Implemented All Universal Safety Interventions:  [place] to call system. Call bell, personal items and telephone within reach. Instruct patient to call for assistance. Room bathroom lighting operational. Non-slip footwear when patient is off stretcher. Physically safe environment: no spills, clutter or unnecessary equipment. Stretcher in lowest position, wheels locked, appropriate side rails in place.

## 2019-06-07 NOTE — ED ADULT NURSE NOTE - OBJECTIVE STATEMENT
Patient presents to ED, AAOX4, ambulatory with a steady gait, complaining of shortness of breath that is constant. Noted to have an anxious affect but is cooperative with care. At present no acute respiratory distress is noted, no use of accessory muscles or audible wheezing present. Patient denies any chest pain, abdominal pain, fever, cough or chills.

## 2019-06-07 NOTE — ED PROVIDER NOTE - CLINICAL SUMMARY MEDICAL DECISION MAKING FREE TEXT BOX
Dr. Kiran Note: likely emphysema/copd exacerbation with anxiety, no cardiac concern given recent workup and clinical history, start inhaler and outpt f/u.

## 2019-06-07 NOTE — ED ADULT NURSE NOTE - CHPI ED NUR SYMPTOMS NEG
no body aches/no hemoptysis/no cough/no chest pain/no chills/no diaphoresis/no fever/no wheezing/no edema/no headache

## 2019-06-07 NOTE — ED ADULT NURSE REASSESSMENT NOTE - NS ED NURSE REASSESS COMMENT FT1
Patient requested food tray as per MD Kiran was provided one, tolerated it well.
Patient was discharged in stable condition, instructions were provided and reviewed, verbalized understanding. Combivent inhaler was provider to patient and instructed on use. Vital signs were updated, all belongings were taken by patient. Left AAOx4, was ambulatory and was unaccompanied.

## 2019-06-07 NOTE — ED PROVIDER NOTE - OBJECTIVE STATEMENT
Dr. Kiran Note: 78M with dyspnea for 2-3 days, seen yesterday with ct scan showing emphysema and cardiac w/u negative, previous echo showing EF65% with mild LV hypertrophy, still with mild dyspnea, non-exertional, non-positional, no assoc productive cough, chest pain, headache, vomiting.  No treatment for dyspnea thus far.  Ex-smoker.  Worked in [employer] but not recently.

## 2019-06-07 NOTE — ED PROVIDER NOTE - NSFOLLOWUPINSTRUCTIONS_ED_ALL_ED_FT
1. Use Combivent inhaler 1 puff every 4-6 hours for shortness of breath.  2. Follow up PCP for further evaluation and treatment for your emphysema and shortness of breath.

## 2019-07-24 ENCOUNTER — INPATIENT (INPATIENT)
Facility: HOSPITAL | Age: 79
LOS: 9 days | Discharge: HOME | DRG: 305 | End: 2019-08-03
Attending: INTERNAL MEDICINE | Admitting: INTERNAL MEDICINE
Payer: MEDICARE

## 2019-07-24 VITALS
WEIGHT: 169.98 LBS | RESPIRATION RATE: 15 BRPM | SYSTOLIC BLOOD PRESSURE: 203 MMHG | HEART RATE: 70 BPM | DIASTOLIC BLOOD PRESSURE: 100 MMHG | OXYGEN SATURATION: 100 % | TEMPERATURE: 98 F | HEIGHT: 70 IN

## 2019-07-24 DIAGNOSIS — I16.0 HYPERTENSIVE URGENCY: ICD-10-CM

## 2019-07-24 DIAGNOSIS — F32.9 MAJOR DEPRESSIVE DISORDER, SINGLE EPISODE, UNSPECIFIED: ICD-10-CM

## 2019-07-24 DIAGNOSIS — F41.9 ANXIETY DISORDER, UNSPECIFIED: ICD-10-CM

## 2019-07-24 DIAGNOSIS — Z90.89 ACQUIRED ABSENCE OF OTHER ORGANS: Chronic | ICD-10-CM

## 2019-07-24 DIAGNOSIS — R06.00 DYSPNEA, UNSPECIFIED: ICD-10-CM

## 2019-07-24 DIAGNOSIS — F03.90 UNSPECIFIED DEMENTIA WITHOUT BEHAVIORAL DISTURBANCE: ICD-10-CM

## 2019-07-24 DIAGNOSIS — F33.1 MAJOR DEPRESSIVE DISORDER, RECURRENT, MODERATE: ICD-10-CM

## 2019-07-24 LAB
ALBUMIN SERPL ELPH-MCNC: 3.5 G/DL — SIGNIFICANT CHANGE UP (ref 3.3–5)
ALP SERPL-CCNC: 116 U/L — SIGNIFICANT CHANGE UP (ref 30–120)
ALT FLD-CCNC: 27 U/L DA — SIGNIFICANT CHANGE UP (ref 10–60)
ANION GAP SERPL CALC-SCNC: 7 MMOL/L — SIGNIFICANT CHANGE UP (ref 5–17)
ANION GAP SERPL CALC-SCNC: 9 MMOL/L — SIGNIFICANT CHANGE UP (ref 5–17)
APPEARANCE UR: CLEAR — SIGNIFICANT CHANGE UP
APTT BLD: 27.9 SEC — LOW (ref 28.5–37)
AST SERPL-CCNC: 26 U/L — SIGNIFICANT CHANGE UP (ref 10–40)
BACTERIA # UR AUTO: ABNORMAL
BASE EXCESS BLDV CALC-SCNC: -5.9 MMOL/L — LOW (ref -2–2)
BASOPHILS # BLD AUTO: 0.04 K/UL — SIGNIFICANT CHANGE UP (ref 0–0.2)
BASOPHILS NFR BLD AUTO: 0.7 % — SIGNIFICANT CHANGE UP (ref 0–2)
BILIRUB SERPL-MCNC: 0.6 MG/DL — SIGNIFICANT CHANGE UP (ref 0.2–1.2)
BILIRUB UR-MCNC: NEGATIVE — SIGNIFICANT CHANGE UP
BUN SERPL-MCNC: 10 MG/DL — SIGNIFICANT CHANGE UP (ref 7–23)
BUN SERPL-MCNC: 10 MG/DL — SIGNIFICANT CHANGE UP (ref 7–23)
CALCIUM SERPL-MCNC: 9.7 MG/DL — SIGNIFICANT CHANGE UP (ref 8.4–10.5)
CALCIUM SERPL-MCNC: 9.8 MG/DL — SIGNIFICANT CHANGE UP (ref 8.4–10.5)
CHLORIDE SERPL-SCNC: 106 MMOL/L — SIGNIFICANT CHANGE UP (ref 96–108)
CHLORIDE SERPL-SCNC: 107 MMOL/L — SIGNIFICANT CHANGE UP (ref 96–108)
CO2 SERPL-SCNC: 24 MMOL/L — SIGNIFICANT CHANGE UP (ref 22–31)
CO2 SERPL-SCNC: 26 MMOL/L — SIGNIFICANT CHANGE UP (ref 22–31)
COLOR SPEC: YELLOW — SIGNIFICANT CHANGE UP
CREAT SERPL-MCNC: 1.09 MG/DL — SIGNIFICANT CHANGE UP (ref 0.5–1.3)
CREAT SERPL-MCNC: 1.13 MG/DL — SIGNIFICANT CHANGE UP (ref 0.5–1.3)
DIFF PNL FLD: NEGATIVE — SIGNIFICANT CHANGE UP
EOSINOPHIL # BLD AUTO: 0.1 K/UL — SIGNIFICANT CHANGE UP (ref 0–0.5)
EOSINOPHIL NFR BLD AUTO: 1.7 % — SIGNIFICANT CHANGE UP (ref 0–6)
EPI CELLS # UR: SIGNIFICANT CHANGE UP
GAS PNL BLDV: SIGNIFICANT CHANGE UP
GLUCOSE BLDC GLUCOMTR-MCNC: 131 MG/DL — HIGH (ref 70–99)
GLUCOSE SERPL-MCNC: 104 MG/DL — HIGH (ref 70–99)
GLUCOSE SERPL-MCNC: 145 MG/DL — HIGH (ref 70–99)
GLUCOSE UR QL: NEGATIVE MG/DL — SIGNIFICANT CHANGE UP
HCO3 BLDV-SCNC: 20 MMOL/L — LOW (ref 21–29)
HCT VFR BLD CALC: 35.2 % — LOW (ref 39–50)
HCT VFR BLD CALC: 36.8 % — LOW (ref 39–50)
HGB BLD-MCNC: 11.3 G/DL — LOW (ref 13–17)
HGB BLD-MCNC: 12 G/DL — LOW (ref 13–17)
HOROWITZ INDEX BLDV+IHG-RTO: 21 — SIGNIFICANT CHANGE UP
IMM GRANULOCYTES NFR BLD AUTO: 0.3 % — SIGNIFICANT CHANGE UP (ref 0–1.5)
INR BLD: 0.98 RATIO — SIGNIFICANT CHANGE UP (ref 0.88–1.16)
KETONES UR-MCNC: ABNORMAL
LEUKOCYTE ESTERASE UR-ACNC: ABNORMAL
LYMPHOCYTES # BLD AUTO: 1.38 K/UL — SIGNIFICANT CHANGE UP (ref 1–3.3)
LYMPHOCYTES # BLD AUTO: 23.5 % — SIGNIFICANT CHANGE UP (ref 13–44)
MCHC RBC-ENTMCNC: 25.1 PG — LOW (ref 27–34)
MCHC RBC-ENTMCNC: 25.6 PG — LOW (ref 27–34)
MCHC RBC-ENTMCNC: 32.1 GM/DL — SIGNIFICANT CHANGE UP (ref 32–36)
MCHC RBC-ENTMCNC: 32.6 GM/DL — SIGNIFICANT CHANGE UP (ref 32–36)
MCV RBC AUTO: 78 FL — LOW (ref 80–100)
MCV RBC AUTO: 78.6 FL — LOW (ref 80–100)
MONOCYTES # BLD AUTO: 0.4 K/UL — SIGNIFICANT CHANGE UP (ref 0–0.9)
MONOCYTES NFR BLD AUTO: 6.8 % — SIGNIFICANT CHANGE UP (ref 2–14)
NEUTROPHILS # BLD AUTO: 3.92 K/UL — SIGNIFICANT CHANGE UP (ref 1.8–7.4)
NEUTROPHILS NFR BLD AUTO: 67 % — SIGNIFICANT CHANGE UP (ref 43–77)
NITRITE UR-MCNC: NEGATIVE — SIGNIFICANT CHANGE UP
NRBC # BLD: 0 /100 WBCS — SIGNIFICANT CHANGE UP (ref 0–0)
NRBC # BLD: 0 /100 WBCS — SIGNIFICANT CHANGE UP (ref 0–0)
NT-PROBNP SERPL-SCNC: 436 PG/ML — SIGNIFICANT CHANGE UP (ref 0–450)
PCO2 BLDV: 29 MMHG — LOW (ref 35–50)
PH BLDV: 7.41 — SIGNIFICANT CHANGE UP (ref 7.35–7.45)
PH UR: 7 — SIGNIFICANT CHANGE UP (ref 5–8)
PLATELET # BLD AUTO: 231 K/UL — SIGNIFICANT CHANGE UP (ref 150–400)
PLATELET # BLD AUTO: 242 K/UL — SIGNIFICANT CHANGE UP (ref 150–400)
PO2 BLDV: 53 MMHG — HIGH (ref 25–45)
POTASSIUM SERPL-MCNC: 3 MMOL/L — LOW (ref 3.5–5.3)
POTASSIUM SERPL-MCNC: 3.7 MMOL/L — SIGNIFICANT CHANGE UP (ref 3.5–5.3)
POTASSIUM SERPL-SCNC: 3 MMOL/L — LOW (ref 3.5–5.3)
POTASSIUM SERPL-SCNC: 3.7 MMOL/L — SIGNIFICANT CHANGE UP (ref 3.5–5.3)
PROT SERPL-MCNC: 6.9 G/DL — SIGNIFICANT CHANGE UP (ref 6–8.3)
PROT UR-MCNC: 30 MG/DL
PROTHROM AB SERPL-ACNC: 10.7 SEC — SIGNIFICANT CHANGE UP (ref 10–12.9)
RBC # BLD: 4.51 M/UL — SIGNIFICANT CHANGE UP (ref 4.2–5.8)
RBC # BLD: 4.68 M/UL — SIGNIFICANT CHANGE UP (ref 4.2–5.8)
RBC # FLD: 15.7 % — HIGH (ref 10.3–14.5)
RBC # FLD: 15.8 % — HIGH (ref 10.3–14.5)
RBC CASTS # UR COMP ASSIST: SIGNIFICANT CHANGE UP /HPF (ref 0–4)
SAO2 % BLDV: 86 % — SIGNIFICANT CHANGE UP (ref 67–88)
SODIUM SERPL-SCNC: 139 MMOL/L — SIGNIFICANT CHANGE UP (ref 135–145)
SODIUM SERPL-SCNC: 140 MMOL/L — SIGNIFICANT CHANGE UP (ref 135–145)
SP GR SPEC: 1.01 — SIGNIFICANT CHANGE UP (ref 1.01–1.02)
TROPONIN I SERPL-MCNC: 0.02 NG/ML — SIGNIFICANT CHANGE UP (ref 0.02–0.06)
TSH SERPL-MCNC: 0.99 UIU/ML — SIGNIFICANT CHANGE UP (ref 0.27–4.2)
UROBILINOGEN FLD QL: NEGATIVE MG/DL — SIGNIFICANT CHANGE UP
WBC # BLD: 5.8 K/UL — SIGNIFICANT CHANGE UP (ref 3.8–10.5)
WBC # BLD: 5.86 K/UL — SIGNIFICANT CHANGE UP (ref 3.8–10.5)
WBC # FLD AUTO: 5.8 K/UL — SIGNIFICANT CHANGE UP (ref 3.8–10.5)
WBC # FLD AUTO: 5.86 K/UL — SIGNIFICANT CHANGE UP (ref 3.8–10.5)
WBC UR QL: SIGNIFICANT CHANGE UP

## 2019-07-24 PROCEDURE — 99285 EMERGENCY DEPT VISIT HI MDM: CPT

## 2019-07-24 PROCEDURE — 90792 PSYCH DIAG EVAL W/MED SRVCS: CPT

## 2019-07-24 PROCEDURE — 93010 ELECTROCARDIOGRAM REPORT: CPT

## 2019-07-24 PROCEDURE — 71046 X-RAY EXAM CHEST 2 VIEWS: CPT | Mod: 26

## 2019-07-24 RX ORDER — ASPIRIN/CALCIUM CARB/MAGNESIUM 324 MG
81 TABLET ORAL DAILY
Refills: 0 | Status: DISCONTINUED | OUTPATIENT
Start: 2019-07-24 | End: 2019-08-03

## 2019-07-24 RX ORDER — HYDRALAZINE HCL 50 MG
25 TABLET ORAL ONCE
Refills: 0 | Status: COMPLETED | OUTPATIENT
Start: 2019-07-24 | End: 2019-07-24

## 2019-07-24 RX ORDER — LABETALOL HCL 100 MG
200 TABLET ORAL
Refills: 0 | Status: DISCONTINUED | OUTPATIENT
Start: 2019-07-24 | End: 2019-08-03

## 2019-07-24 RX ORDER — MIRTAZAPINE 45 MG/1
15 TABLET, ORALLY DISINTEGRATING ORAL AT BEDTIME
Refills: 0 | Status: DISCONTINUED | OUTPATIENT
Start: 2019-07-24 | End: 2019-07-24

## 2019-07-24 RX ORDER — CLONAZEPAM 1 MG
0.5 TABLET ORAL
Refills: 0 | Status: DISCONTINUED | OUTPATIENT
Start: 2019-07-24 | End: 2019-07-31

## 2019-07-24 RX ORDER — AMLODIPINE BESYLATE 2.5 MG/1
10 TABLET ORAL DAILY
Refills: 0 | Status: DISCONTINUED | OUTPATIENT
Start: 2019-07-24 | End: 2019-08-03

## 2019-07-24 RX ORDER — DOXAZOSIN MESYLATE 4 MG
1 TABLET ORAL AT BEDTIME
Refills: 0 | Status: DISCONTINUED | OUTPATIENT
Start: 2019-07-24 | End: 2019-08-03

## 2019-07-24 RX ORDER — ENOXAPARIN SODIUM 100 MG/ML
40 INJECTION SUBCUTANEOUS DAILY
Refills: 0 | Status: DISCONTINUED | OUTPATIENT
Start: 2019-07-24 | End: 2019-08-03

## 2019-07-24 RX ORDER — QUETIAPINE FUMARATE 200 MG/1
25 TABLET, FILM COATED ORAL
Refills: 0 | Status: DISCONTINUED | OUTPATIENT
Start: 2019-07-24 | End: 2019-07-31

## 2019-07-24 RX ORDER — ESCITALOPRAM OXALATE 10 MG/1
20 TABLET, FILM COATED ORAL DAILY
Refills: 0 | Status: DISCONTINUED | OUTPATIENT
Start: 2019-07-24 | End: 2019-08-03

## 2019-07-24 RX ORDER — CLONAZEPAM 1 MG
0.5 TABLET ORAL
Refills: 0 | Status: DISCONTINUED | OUTPATIENT
Start: 2019-07-24 | End: 2019-07-24

## 2019-07-24 RX ORDER — HYDRALAZINE HCL 50 MG
25 TABLET ORAL EVERY 8 HOURS
Refills: 0 | Status: DISCONTINUED | OUTPATIENT
Start: 2019-07-24 | End: 2019-07-25

## 2019-07-24 RX ORDER — TIOTROPIUM BROMIDE 18 UG/1
1 CAPSULE ORAL; RESPIRATORY (INHALATION) DAILY
Refills: 0 | Status: DISCONTINUED | OUTPATIENT
Start: 2019-07-24 | End: 2019-08-03

## 2019-07-24 RX ORDER — ALBUTEROL 90 UG/1
2 AEROSOL, METERED ORAL EVERY 6 HOURS
Refills: 0 | Status: DISCONTINUED | OUTPATIENT
Start: 2019-07-24 | End: 2019-08-03

## 2019-07-24 RX ORDER — POTASSIUM CHLORIDE 20 MEQ
40 PACKET (EA) ORAL ONCE
Refills: 0 | Status: COMPLETED | OUTPATIENT
Start: 2019-07-24 | End: 2019-07-24

## 2019-07-24 RX ORDER — MIRTAZAPINE 45 MG/1
1 TABLET, ORALLY DISINTEGRATING ORAL
Qty: 0 | Refills: 0 | DISCHARGE

## 2019-07-24 RX ORDER — CLONAZEPAM 1 MG
1 TABLET ORAL
Qty: 0 | Refills: 0 | DISCHARGE

## 2019-07-24 RX ORDER — CLONAZEPAM 1 MG
1 TABLET ORAL ONCE
Refills: 0 | Status: DISCONTINUED | OUTPATIENT
Start: 2019-07-24 | End: 2019-07-24

## 2019-07-24 RX ADMIN — Medication 1 MILLIGRAM(S): at 21:45

## 2019-07-24 RX ADMIN — Medication 25 MILLIGRAM(S): at 09:32

## 2019-07-24 RX ADMIN — Medication 200 MILLIGRAM(S): at 17:17

## 2019-07-24 RX ADMIN — Medication 20 MILLIGRAM(S): at 17:16

## 2019-07-24 RX ADMIN — QUETIAPINE FUMARATE 25 MILLIGRAM(S): 200 TABLET, FILM COATED ORAL at 17:29

## 2019-07-24 RX ADMIN — Medication 1 MILLIGRAM(S): at 08:34

## 2019-07-24 RX ADMIN — Medication 0.5 MILLIGRAM(S): at 17:20

## 2019-07-24 RX ADMIN — Medication 40 MILLIEQUIVALENT(S): at 17:43

## 2019-07-24 RX ADMIN — AMLODIPINE BESYLATE 10 MILLIGRAM(S): 2.5 TABLET ORAL at 17:16

## 2019-07-24 RX ADMIN — Medication 81 MILLIGRAM(S): at 11:47

## 2019-07-24 RX ADMIN — ESCITALOPRAM OXALATE 20 MILLIGRAM(S): 10 TABLET, FILM COATED ORAL at 11:47

## 2019-07-24 RX ADMIN — Medication 25 MILLIGRAM(S): at 21:45

## 2019-07-24 RX ADMIN — ENOXAPARIN SODIUM 40 MILLIGRAM(S): 100 INJECTION SUBCUTANEOUS at 11:47

## 2019-07-24 RX ADMIN — Medication 25 MILLIGRAM(S): at 17:20

## 2019-07-24 RX ADMIN — TIOTROPIUM BROMIDE 1 CAPSULE(S): 18 CAPSULE ORAL; RESPIRATORY (INHALATION) at 17:17

## 2019-07-24 NOTE — H&P ADULT - PROBLEM SELECTOR PLAN 1
pt with elevated bp in er  likely not compliant with meds given his anxiety  restart all bp meds  echo from prior admission noted  cardio eval noted  continue to [facility]

## 2019-07-24 NOTE — ED PROVIDER NOTE - PROGRESS NOTE DETAILS
BP improved. Seen by Wilfrido. Will need admission for BP control. May need social work intervention for future placement as pt not able to care for himself.

## 2019-07-24 NOTE — ED PROVIDER NOTE - CHPI ED SYMPTOMS NEG
no homicidal/no change in level of consciousness/no agitation/no hallucinations/no weight loss/no suicidal/no weakness/no disorientation/no paranoia

## 2019-07-24 NOTE — ED ADULT NURSE NOTE - OBJECTIVE STATEMENT
Patient describes feeling anxious and short of breath since yesterday. Denies any pain and presents hyperventilating and anxious with difficulty focusing. Alert to person and place but having trouble focusing and too anxious to accurately give date and time but when given time answers appropriately. Patient states he lives alone and became anxious when he couldn't breath well. Lungs clear to auscultation.

## 2019-07-24 NOTE — BEHAVIORAL HEALTH ASSESSMENT NOTE - HPI (INCLUDE ILLNESS QUALITY, SEVERITY, DURATION, TIMING, CONTEXT, MODIFYING FACTORS, ASSOCIATED SIGNS AND SYMPTOMS)
Patient is a 77 year-old  male, retired, living alone,  since 2016, with a history of depression since his wife passed away three years ago, with no prior / current suicidal ideation, with no history of suicidality, with no prior in-patient hospitalization, no legal history, no history violence/aggression, no substance abuse history admitted to medicine for uncontrolled hypertension. PMH HLD, HTN.     Patient states that he really misses his wife who passed away 3 years ago. patient states that they were  for 60 years. patient states that 4 of kids, all sons, do not take care of him. patient states that his neighbor Madi helps him a lot. patient states that his neighbor Madi is going ot buy his home and the patient will move into a senior co-op building. Patient was not orientated to the month. Patient completed a MOCA. Patient scored a 21, patient was not able to complete the recall. Patient excelled at the math section. Patient seems to have mild dementia and depression. Patient is a 77 year-old  male, retired, living alone,  since 2016, with a history of depression since his wife passed away three years ago, with no prior / current suicidal ideation, with no history of suicidality, with no prior in-patient hospitalization, no legal history, no history violence/aggression, no substance abuse history admitted to medicine for uncontrolled hypertension. PMH HLD, HTN.     Patient states that he really misses his wife who passed away 3 years ago. patient states that they were  for 60 years. patient states that 4 of kids, all sons, do not take care of him. Patient states that his neighbor Madi helps him  with his medications. patient states that his neighbor Madi is going ot buy his home and the patient will move into a senior co-op building. Patient was not orientated to the month. Patient completed a MOCA. Patient scored a 21, patient was not able to complete the recall. Patient excelled at the math section. Patient seems to have mild dementia and depression. HE denies any thoughts of harming himself or others.  His son, Austin Fox [phone] called back to say that his father will not listen to his sons and will not go out of the house for any activities.  He understands that his father has dementia and may not be safe at home alone and may not be able to participate in outside activities as he used to participate.

## 2019-07-24 NOTE — CONSULT NOTE ADULT - SUBJECTIVE AND OBJECTIVE BOX
History of Present Illness: The patient is a 78 year old male with a history of HTN, HL, depression, anxiety who presents with shortness of breath and hypertension. He has similar issues back in April when he was admitted. Since discharge, he has intermittent episodes of shortness of breath. He states it began last night. He was unable to sleep. He thinks he took his medications yesterday and today. He has no other complaints. No chest pain, lower extremity edema, cough.    Past Medical/Surgical History:  HTN, HL, depression/anxiety    Medications:  Home Medications:  amLODIPine 10 mg oral tablet: 1 tab(s) orally once a day (24 Jul 2019 08:09)  aspirin 81 mg oral tablet: 1 tab(s) orally once a day (24 Jul 2019 08:09)  clonazePAM 0.5 mg oral tablet: 1 tab(s) orally once a day (24 Jul 2019 08:09)  enalapril 20 mg oral tablet: 1 tab(s) orally 2 times a day (24 Jul 2019 08:09)  hydrALAZINE 25 mg oral tablet: 1 tab(s) orally every 8 hours (24 Jul 2019 08:09)  labetalol 200 mg oral tablet: 1 tab(s) orally 2 times a day (24 Jul 2019 08:09)  mirtazapine 15 mg oral tablet: 1 tab(s) orally once a day (at bedtime) (24 Jul 2019 08:09)      Family History: Non-contributory family history of premature cardiovascular atherosclerotic disease    Social History: No tobacco, alcohol or drug use    Review of Systems:  General: No fevers, chills, weight loss or gain  Skin: No rashes, color changes  Cardiovascular: No chest pain, orthopnea  Respiratory: No shortness of breath, cough  Gastrointestinal: No nausea, abdominal pain  Genitourinary: No incontinence, pain with urination  Musculoskeletal: No pain, swelling, decreased range of motion  Neurological: No headache, weakness  Psychiatric: No depression, anxiety  Endocrine: No weight loss or gain, increased thirst  All other systems are comprehensively negative.    Physical Exam:  Vitals:        Vital Signs Last 24 Hrs  T(C): 36.6 (24 Jul 2019 07:34), Max: 36.6 (24 Jul 2019 07:34)  T(F): 97.9 (24 Jul 2019 07:34), Max: 97.9 (24 Jul 2019 07:34)  HR: 70 (24 Jul 2019 07:34) (70 - 70)  BP: 203/100 (24 Jul 2019 07:34) (203/100 - 203/100)  BP(mean): --  RR: 15 (24 Jul 2019 07:34) (15 - 15)  SpO2: 100% (24 Jul 2019 07:34) (100% - 100%)  General: NAD  HEENT: MMM  Neck: No JVD, no carotid bruit  Lungs: CTAB  CV: RRR, nl S1/S2, no M/R/G  Abdomen: S/NT/ND, +BS  Extremities: No LE edema, no cyanosis  Neuro: AAOx3, non-focal  Skin: No rash    Labs:                        12.0   5.86  )-----------( 242      ( 24 Jul 2019 08:43 )             36.8     07-24    140  |  107  |  10  ----------------------------<  104<H>  3.7   |  26  |  1.09    Ca    9.8      24 Jul 2019 08:43          PT/INR - ( 24 Jul 2019 08:43 )   PT: 10.7 sec;   INR: 0.98 ratio         PTT - ( 24 Jul 2019 08:43 )  PTT:27.9 sec    ECG: NSR, normal axis, early transition R wave, borderline LVH
Date/Time Patient Seen:  		  Referring MD:   Data Reviewed	       Patient is a 78y old  Male who presents with a chief complaint of     Subjective/HPI  in bed  seen and examined  vs and meds reviewed  labs reviewed  ER provider note reviewed  CT chest and TTE from prior admission reviewed  known to me from prior admission  sob and ordonez   poor compliance with prescribed medication  depressed and tears up easily - grieving for wife -     Child Abuse Assessment (patients less than 13 yrs):  Chief Complaint: shortness of breath.    · Chief Complaint: The patient is a 78y Male complaining of shortness of breath.  · HPI Objective Statement: 79 yo male with hx of anxiety, frequent ER visits for SOB, anxiety, brought by ambulance from home for eval of SOB since last night. Pt admits to increased anxiety today. Cannot remember if he took his anxiety med or not. Denies fever, cough, CP, sputum, N, V, or other symptom.    	PCP [facility]  · Presenting Symptoms: ANXIETY, NERVOUSNESS  · Negative Findings: no agitation, no change in level of consciousness, no disorientation, no hallucinations, no homicidal, no paranoia, no suicidal, no weakness, no weight loss  · Timing: unknown  · Duration: yesterday  · Quality: anxiety producing  · Severity: MODERATE  · Context: unknown  · Aggravated Factors: none  · Relieving Factors: none    PAST MEDICAL/SURGICAL/FAMILY/SOCIAL HISTORY:    Past Medical History:  Anxiety    HLD (hyperlipidemia)    HTN (hypertension).    · Subjective and Objective:   History of Present Illness: The patient is a 78 year old male with a history of HTN, HL, depression, anxiety who presents with shortness of breath and hypertension. He has similar issues back in April when he was admitted. Since discharge, he has intermittent episodes of shortness of breath. He states it began last night. He was unable to sleep. He thinks he took his medications yesterday and today. He has no other complaints. No chest pain, lower extremity edema, cough.    Past Medical/Surgical History:  HTN, HL, depression/anxiety       PAST MEDICAL & SURGICAL HISTORY:  Anxiety  HLD (hyperlipidemia)  HTN (hypertension)  History of tonsillectomy  No significant past surgical history        Medication list         MEDICATIONS  (STANDING):    MEDICATIONS  (PRN):         Vitals log        ICU Vital Signs Last 24 Hrs  T(C): 36.6 (24 Jul 2019 07:34), Max: 36.6 (24 Jul 2019 07:34)  T(F): 97.9 (24 Jul 2019 07:34), Max: 97.9 (24 Jul 2019 07:34)  HR: 85 (24 Jul 2019 09:30) (70 - 85)  BP: 157/86 (24 Jul 2019 09:30) (157/86 - 203/100)  BP(mean): --  ABP: --  ABP(mean): --  RR: 21 (24 Jul 2019 09:30) (15 - 27)  SpO2: 97% (24 Jul 2019 09:30) (97% - 100%)           Input and Output:  I&O's Detail      Lab Data                        12.0   5.86  )-----------( 242      ( 24 Jul 2019 08:43 )             36.8     07-24    140  |  107  |  10  ----------------------------<  104<H>  3.7   |  26  |  1.09    Ca    9.8      24 Jul 2019 08:43    TPro  6.9  /  Alb  3.5  /  TBili  0.6  /  DBili  x   /  AST  26  /  ALT  27  /  AlkPhos  116  07-24      CARDIAC MARKERS ( 24 Jul 2019 08:43 )  .019 ng/mL / x     / x     / x     / x          ex smoker    lives at home  has 4 children  depressed      Review of Systems	  depressed -     Objective     Physical Examination    heart s1s2  lung dec BS  abd soft  cn grossly int  depressed  flat affect  tears up easy  moves all extr      Pertinent Lab findings & Imaging      [place]:  NO   Adequate UO     I&O's Detail           Discussed with:     Cultures:	        Radiology                EXAM:  CT ANGIO CHEST (W)AW IC                                  PROCEDURE DATE:  06/06/2019          INTERPRETATION:  CLINICAL HISTORY:  Shortness of breath. Evaluate for   pulmonary embolism.    Multiple axial images of the chest were obtained from the lung apices   through upper abdomen with the administration of intravascular contrast.   86cc of Omnipaque 350 was administered intravenously without   complication. Reformatted coronal and sagittal images are submitted. MIP   images were created utilizing dedicated computer software.    COMPARISON: 11/11/2018    FINDINGS: No filling defects are identified within the segmental   pulmonary arterial tree to indicate pulmonary embolism.    No abnormally enlarged mediastinal or hilar lymph nodes are noted. There   is no evidence for axillary lymphadenopathy. The heart size appears   within normal limits. No pericardial effusion is identified. Thoracic   aortic caliber demonstrates unremarkable caliber.    The central bronchial anatomy appears patent. Stable soft tissue   nodularity is identified along the anterior wall of the trachea.    Emphysematous changes are identified bilaterally, right greater than   left. There is no evidence for lung parenchymal infiltrate, consolidation   or suspicious mass lesion. Stable less than 3 mm nodules are identified   within the periphery of the right middle lobe, left upper lobe and left   lower lobe. There is no evidence for pleural effusion or pneumothorax. No   mediastinal shift is noted.    Imaging of the upper abdomen demonstrates an unremarkable appearance to   the visualized portions of the bilateral adrenal glands. Degenerative   changes of the thoracic spine noted.    IMPRESSION:  No filling defects identified within the segmental pulmonary   arterial tree to indicate pulmonary embolism. No evidence for interval   development of focal infiltrate or consolidation. No pleural effusion   identified. Stable soft tissue nodularity identified along the anterior   wall of the trachea.                    MENG SEARS   This document has been electronically signed. Jun 6 2019  6:31PM

## 2019-07-24 NOTE — BEHAVIORAL HEALTH ASSESSMENT NOTE - NSBHCHARTREVIEWLAB_PSY_A_CORE FT
07-24  CBC Full  -  ( 24 Jul 2019 12:06 )  WBC Count : 5.80 K/uL  RBC Count : 4.51 M/uL  Hemoglobin : 11.3 g/dL  Hematocrit : 35.2 %  Platelet Count - Automated : 231 K/uL  Mean Cell Volume : 78.0 fl  Mean Cell Hemoglobin : 25.1 pg  Mean Cell Hemoglobin Concentration : 32.1 gm/dL  Auto Neutrophil # : x  Auto Lymphocyte # : x  Auto Monocyte # : x  Auto Eosinophil # : x  Auto Basophil # : x  Auto Neutrophil % : x  Auto Lymphocyte % : x  Auto Monocyte % : x  Auto Eosinophil % : x  Auto Basophil % : x    139  |  106  |  10  ----------------------------<  145<H>  3.0<L>   |  24  |  1.13    Ca    9.7      24 Jul 2019 12:06    TPro  6.9  /  Alb  3.5  /  TBili  0.6  /  DBili  x   /  AST  26  /  ALT  27  /  AlkPhos  116  07-24

## 2019-07-24 NOTE — ED PROVIDER NOTE - OBJECTIVE STATEMENT
79 yo male with hx of anxiety, frequent ER visits for SOB, anxiety, brought by ambulance from home for eval of SOB since last night. Pt admits to increased anxiety today. Cannot remember if he took his anxiety med or not. Denies fever, cough, CP, sputum, N, V, or other symptom.    PCP [facility]  [facility]

## 2019-07-24 NOTE — ED PROVIDER NOTE - CLINICAL SUMMARY MEDICAL DECISION MAKING FREE TEXT BOX
77 yo male with anxiety recurrent episodes of dyspnea with negative workup in the past has dyspnea and anxiety today. PE normal. Plan - labs, cxr, ekg, antianxiety meds. Consider psych/pulm eval.

## 2019-07-24 NOTE — CONSULT NOTE ADULT - ASSESSMENT
The patient is a 78 year old male with a history of HTN, HL, depression, anxiety who presents with shortness of breath and hypertension.    Plan:  - ECG with no evidence of ischemia or infarction  - Check one set of cardiac enzymes  - CXR clear  - CTA chest done last month for similar symptoms and negative for PE  - Echo from 4/2019 with normal LV systolic function, no significant valve issues  - Give an additional dose of hydralazine 25 mg  - Received clonazepam  - Suspect symptoms and hypertension due to underlying psychiatric issues and possibly noncompliance with medications  - No further cardiac testing indicated at this time

## 2019-07-24 NOTE — H&P ADULT - HISTORY OF PRESENT ILLNESS
79 yo male with hx of anxiety, frequent ER visits for SOB, anxiety, brought by ambulance from home for eval of SOB since last night. Pt admits to increased anxiety today. Cannot remember if he took his anxiety med or not. Denies fever, cough, CP, sputum, N, V, or other symptom. Pt in er noted to have elevated bp and is anxious. needs further bp control and psych eval for possible placement.   PCP [facility]

## 2019-07-24 NOTE — ED ADULT NURSE NOTE - CHPI ED NUR SYMPTOMS NEG
no diaphoresis/no cough/no fever/no body aches/no chest pain/no edema/no headache/no hemoptysis/no wheezing/no chills

## 2019-07-24 NOTE — PHYSICAL THERAPY INITIAL EVALUATION ADULT - PERTINENT HX OF CURRENT PROBLEM, REHAB EVAL
Pt admitted into hospital with dx of hypertensive crisis. Pt has history of SOB and anxiety and depression since wife passing away

## 2019-07-24 NOTE — H&P ADULT - ATTENDING COMMENTS
medial compliants of sob likely from underlying psych issues - to evaluated  pulm and cardio eval noted  sw called medial compliants of sob likely from underlying psych issues  pulm and cardio eval noted  sw called

## 2019-07-24 NOTE — BEHAVIORAL HEALTH ASSESSMENT NOTE - NSBHCHARTREVIEWVS_PSY_A_CORE FT
Vital Signs Last 24 Hrs  T(C): 36.6 (24 Jul 2019 07:34), Max: 36.6 (24 Jul 2019 07:34)  T(F): 97.9 (24 Jul 2019 07:34), Max: 97.9 (24 Jul 2019 07:34)  HR: 67 (24 Jul 2019 12:34) (67 - 85)  BP: 148/78 (24 Jul 2019 12:34) (134/74 - 203/100)  BP(mean): --  RR: 20 (24 Jul 2019 12:34) (15 - 27)  SpO2: 96% (24 Jul 2019 12:34) (96% - 100%)

## 2019-07-24 NOTE — BEHAVIORAL HEALTH ASSESSMENT NOTE - SUMMARY
Patient is a 77 year-old  male, retired, living alone,  since 2016, with a history of depression since his wife passed away three years ago, with no prior / current suicidal ideation, with no history of suicidality, with no prior in-patient hospitalization, no legal history, no history violence/aggression, no substance abuse history admitted to medicine for uncontrolled hypertension. PMH HLD, HTN.      Patient presenting euthymic, reporting periodic depressed mood since the death of his wife. He denies currently feeling depressed. Patient reports being  60 years prior wife passing away and missed her very much.    Austin Darnell [phone]   Plan:  MOCA testing: patient score 21, showing signs of mild dementia with specific issues with recall   Continue current medications of Lexapro, mirtapizine, and clonezpam.   Main concern is getting patient home health. Tried contacting Austin left message for him to call back. Patient will benefit significantly from home aide. Patient trying to sell house to move in to [employer]. Must make sure neighbor Madi is of good intention and will not try to pull a fast one on patient. Patient is a 77 year-old  male, retired, living alone,  since 2016, with a history of depression since his wife passed away three years ago, with no prior / current suicidal ideation, with no history of suicidality, with no prior in-patient hospitalization, no legal history, no history violence/aggression, no substance abuse history admitted to medicine for uncontrolled hypertension. PMH HLD, HTN.      Patient presenting euthymic, reporting periodic depressed mood since the death of his wife. He denies currently feeling depressed. Patient reports being  60 years prior wife passing away and missed her very much.    Austin Darnell [phone]   Plan:  Continue   Lexapro 20mg daily  discontinue Remeron and   Start Seroquel 25mg po q6pm for mood and agitation at home (as per son)  MOCA testing: patient score 21, showing signs of mild dementia with specific issues with recall   Continue current medications of Lexapro, mirtapizine, and clonezpam.   Main concern is having an aid for patient at home since his dementia is progressing.  His son, Austin Fox [phone] called back to say that his father will not listen to his sons and will not go out of the house for any activities.  He understands that his father has dementia and may not be safe at home alone and may not be able to participate in outside activities as he used to participate.

## 2019-07-24 NOTE — BEHAVIORAL HEALTH ASSESSMENT NOTE - NSBHCONSULTFOLLOWAFTERCARE_PSY_A_CORE FT
Patient needs help in caring for his HTN and would benefit from home services if his children are unable to help him.

## 2019-07-24 NOTE — PHYSICAL THERAPY INITIAL EVALUATION ADULT - ADDITIONAL COMMENTS
Pt lives alone in a house with 3 KIKE, +1HR. No steps inside. Pt denies use of any ADs and reports being independent with all self care, cooking and cleaning.

## 2019-07-24 NOTE — CONSULT NOTE ADULT - PROBLEM SELECTOR RECOMMENDATION 9
sob, ordonez, COPD - emphysema -   HTN - poorly controlled - likely non compliance due to depression  CT chest reviewed, TTE reviewed, cardio eval noted  pt is tolerating room air well -   will check VBG, CXR official report pending  will order Spiriva and Proventil - PRN use - for sob and or wheezing  pt never followed up after last visit - will need PFT as outpatient   [facility] rx regimen optimization - BP control and dietary discretion -   will need Psych and SW eval   out of bed as tolerated  discussed plan of care with the patient and staff

## 2019-07-24 NOTE — BEHAVIORAL HEALTH ASSESSMENT NOTE - NSBHCHARTREVIEWINVESTIGATE_PSY_A_CORE FT
< from: 12 Lead ECG (06.06.19 @ 16:15) >    Ventricular Rate 79 BPM    Atrial Rate 79 BPM    P-R Interval 198 ms    QRS Duration 104 ms    Q-T Interval 384 ms    QTC Calculation(Bezet) 440 ms    P Axis 3 degrees    R Axis 19 degrees    T Axis 29 degrees    Diagnosis Line Normal sinus rhythm  Normal ECG  When compared with ECG of 26-APR-2019 18:13,  Nonspecific T wave abnormality, improved in Anterolateral leads  Confirmed by MD JOSE M, MALCOLM ([id number]) on 6/7/2019 9:41:25 AM    < end of copied text >

## 2019-07-24 NOTE — PHYSICAL THERAPY INITIAL EVALUATION ADULT - ACTIVE RANGE OF MOTION EXAMINATION, REHAB EVAL
bilateral lower extremity Active ROM was WNL (within normal limits)/casi. upper extremity Active ROM was WNL (within normal limits)

## 2019-07-24 NOTE — CONSULT NOTE ADULT - CONSULT REASON
Hypertensive urgency, shortness of breath
copd  emphysema  HTN  Depression  Bereavement syndrome - grieving loss of wife  non compliance

## 2019-07-24 NOTE — BEHAVIORAL HEALTH ASSESSMENT NOTE - NSBHCONSULTOBSREASON_PSY_A_CORE FT
Patient is alone at home. Patient is isolated and alone. Patient will need home care for depression and mild dementia

## 2019-07-25 DIAGNOSIS — J44.9 CHRONIC OBSTRUCTIVE PULMONARY DISEASE, UNSPECIFIED: ICD-10-CM

## 2019-07-25 LAB
ANION GAP SERPL CALC-SCNC: 7 MMOL/L — SIGNIFICANT CHANGE UP (ref 5–17)
BUN SERPL-MCNC: 11 MG/DL — SIGNIFICANT CHANGE UP (ref 7–23)
CALCIUM SERPL-MCNC: 9.5 MG/DL — SIGNIFICANT CHANGE UP (ref 8.4–10.5)
CHLORIDE SERPL-SCNC: 107 MMOL/L — SIGNIFICANT CHANGE UP (ref 96–108)
CO2 SERPL-SCNC: 26 MMOL/L — SIGNIFICANT CHANGE UP (ref 22–31)
CREAT SERPL-MCNC: 1.05 MG/DL — SIGNIFICANT CHANGE UP (ref 0.5–1.3)
GLUCOSE SERPL-MCNC: 136 MG/DL — HIGH (ref 70–99)
POTASSIUM SERPL-MCNC: 3.6 MMOL/L — SIGNIFICANT CHANGE UP (ref 3.5–5.3)
POTASSIUM SERPL-SCNC: 3.6 MMOL/L — SIGNIFICANT CHANGE UP (ref 3.5–5.3)
SODIUM SERPL-SCNC: 140 MMOL/L — SIGNIFICANT CHANGE UP (ref 135–145)

## 2019-07-25 PROCEDURE — 99232 SBSQ HOSP IP/OBS MODERATE 35: CPT

## 2019-07-25 RX ORDER — HYDRALAZINE HCL 50 MG
50 TABLET ORAL EVERY 8 HOURS
Refills: 0 | Status: DISCONTINUED | OUTPATIENT
Start: 2019-07-25 | End: 2019-08-03

## 2019-07-25 RX ADMIN — ESCITALOPRAM OXALATE 20 MILLIGRAM(S): 10 TABLET, FILM COATED ORAL at 11:49

## 2019-07-25 RX ADMIN — Medication 50 MILLIGRAM(S): at 13:28

## 2019-07-25 RX ADMIN — Medication 50 MILLIGRAM(S): at 21:13

## 2019-07-25 RX ADMIN — Medication 20 MILLIGRAM(S): at 06:57

## 2019-07-25 RX ADMIN — Medication 200 MILLIGRAM(S): at 18:51

## 2019-07-25 RX ADMIN — Medication 1 MILLIGRAM(S): at 21:13

## 2019-07-25 RX ADMIN — Medication 81 MILLIGRAM(S): at 11:49

## 2019-07-25 RX ADMIN — Medication 25 MILLIGRAM(S): at 07:03

## 2019-07-25 RX ADMIN — Medication 200 MILLIGRAM(S): at 06:57

## 2019-07-25 RX ADMIN — Medication 0.5 MILLIGRAM(S): at 18:53

## 2019-07-25 RX ADMIN — ENOXAPARIN SODIUM 40 MILLIGRAM(S): 100 INJECTION SUBCUTANEOUS at 11:49

## 2019-07-25 RX ADMIN — TIOTROPIUM BROMIDE 1 CAPSULE(S): 18 CAPSULE ORAL; RESPIRATORY (INHALATION) at 06:57

## 2019-07-25 RX ADMIN — Medication 0.5 MILLIGRAM(S): at 06:57

## 2019-07-25 RX ADMIN — QUETIAPINE FUMARATE 25 MILLIGRAM(S): 200 TABLET, FILM COATED ORAL at 18:51

## 2019-07-25 RX ADMIN — AMLODIPINE BESYLATE 10 MILLIGRAM(S): 2.5 TABLET ORAL at 06:56

## 2019-07-25 RX ADMIN — Medication 20 MILLIGRAM(S): at 18:51

## 2019-07-25 NOTE — CHART NOTE - NSCHARTNOTEFT_GEN_A_CORE
Upon Nutritional Assessment by the Registered Dietitian your patient was determined to meet criteria / has evidence of the following diagnosis/diagnoses:          [x ]  Mild Protein Calorie Malnutrition        [ ]  Moderate Protein Calorie Malnutrition        [ ] Severe Protein Calorie Malnutrition        [ ] Unspecified Protein Calorie Malnutrition        [ ] Underweight / BMI <19        [ ] Morbid Obesity / BMI > 40      Findings as based on:  •  Comprehensive nutrition assessment and consultation  •  Calorie counts (nutrient intake analysis)  •  Food acceptance and intake status from observations by staff  •  Follow up  •  Patient education  •  Intervention secondary to interdisciplinary rounds  •   concerns      Treatment:    The following diet has been recommended: continue DASH TLC and add magic cup BID      PROVIDER Section:     By signing this assessment you are acknowledging and agree with the diagnosis/diagnoses assigned by the Registered Dietitian    Comments:          Pt is a  77 yo male with hx of anxiety, frequent ER visits for SOB, anxiety admitted with of SOB/ increased anxiety. Pt found to have hypertensive urgency with suspected noncompliance with meds. Pt reports his weight as 170# and denies weight loss. However, based on past hospital admissions wt November 2018 174#, wt April 2019 169.9# and wt June 2019 159#. Admit weight 170#  (which pt stated was his usual weight) Weighed pt on bed scale: 189# which is not likely accurate. Based on wt hx, pt appears to have lost 16.0# x 8months or 9.1% Pt does not appear to be 189# and  no noted edema. Pt with noted moderate occipital/temporal wasting as well as moderate shoulder wasting.  Based on recall pt states he was eating toast/egg for breakfast, sandwich for lunch and pasta fagiloi for dinner. Pt cooks/prepares meals and he is mindful of  sodium. Pt has been eating very well and has even requested additional food several times because pt was "starving." Pt had hypokalemia and trace ketones in urine. Accuracy of recall questionable: noted in  medical record pt reported not remembering if he took medication. Based on weight loss and moderate muscle/fat wasting, pt meets criteria for mild protein-calorie malnutrition likely related to depression (death of spouse 3 yrs ago) and/or dementia. Continue current diet DASH/TLC and  recommend supplement (fortified ice cream BID)

## 2019-07-25 NOTE — PROGRESS NOTE BEHAVIORAL HEALTH - NSBHFUPINTERVALHXFT_PSY_A_CORE
Patient was brought in yesterday for complaints of SOB. Patient was very depressed yesterday and exhibiting signs of mild dementia. Today, patient states that he is happy. Patient states that this hospital is wonderful and everyone is taking great care of him. Patient denies and suicidal ideation or homicidal ideation. Patient was brought in yesterday for complaints of SOB. Patient was very depressed yesterday and exhibiting signs of dementia. Today, patient states that he is happy. His son had been concerned that pt was acting paranoid and had intermittent agitation.  Pt is calm on the Seroquel without any excess sedation. Patient states that this hospital is wonderful and everyone is taking great care of him. Patient denies and suicidal ideation or homicidal ideation.

## 2019-07-25 NOTE — PROGRESS NOTE BEHAVIORAL HEALTH - SUMMARY
Patient is a 77 year-old  male, retired, living alone,  since 2016, with a history of depression since his wife passed away three years ago, with no prior / current suicidal ideation, with no history of suicidality, with no prior in-patient hospitalization, no legal history, no history violence/aggression, no substance abuse history admitted to medicine for uncontrolled hypertension. PMH HLD, HTN.      Patient presenting euthymic, reporting periodic depressed mood since the death of his wife. He denies currently feeling depressed. Patient reports being  60 years prior wife passing away and missed her very much.     Patient is oriented to the day of the week, date of the month, year, and season. patient thought it was August, but yesterday patient thought it was november. Patient seems to be doing well on the Seroquel. Patient has been smiling and laughing. Patient denies any complaints and states that this hospital takes great care of him     Austin Darnell [phone]   Plan:  Continue   Lexapro 20mg daily  continue Seroquel 25mg po q6pm for mood and agitation at home (as per son). Continue Klonopin 0.5mg BID and 1mg PRN   MOCA testing: patient score 21, showing signs of mild dementia with specific issues with recall   Main concern is having an aid for patient at home since his dementia is progressing.  His son, Austin Fox [phone] called back to say that his father will not listen to his sons and will not go out of the house for any activities.  He understands that his father has dementia and may not be safe at home alone and may not be able to participate in outside activities as he used to participate. Patient is a 77 year-old  male, retired, living alone,  since 2016, with a history of depression since his wife passed away three years ago, with no prior / current suicidal ideation, with no history of suicidality, with no prior in-patient hospitalization, no legal history, no history violence/aggression, no substance abuse history admitted to medicine for uncontrolled hypertension. PMH HLD, HTN.      Patient presenting euthymic, reporting periodic depressed mood since the death of his wife. He denies currently feeling depressed. Patient reports being  60 years prior wife passing away and missed her very much.     Patient is oriented to the day of the week, date of the month, year, and season. patient thought it was August, but yesterday patient thought it was november. Patient is tolerating the Seroquel and says he is sleeping much better. Patient has been smiling and laughing. Patient denies any complaints and states that this hospital takes great care of him.     Austin Darnell [phone]   Plan:  Continue   Lexapro 20mg daily  continue Seroquel 25mg po q6pm for mood and agitation at home (as per son). Continue Klonopin 0.5mg BID and 1mg PRN   MOCA testing: patient score 21, showing signs of mild dementia with specific issues with recall   Main concern is having an aid for patient at home since his dementia is progressing.  His son, Austin Fox [phone] called back to say that his father will not listen to his sons and will not go out of the house for any activities.  He understands that his father has dementia and may not be safe at home alone and may not be able to participate in outside activities as he used to participate.

## 2019-07-25 NOTE — DIETITIAN INITIAL EVALUATION ADULT. - OTHER INFO
Pt is a  77 yo male with hx of anxiety, frequent ER visits for SOB, anxiety admitted with of SOB/ increased anxiety.Pt found to have hypertensive urgency with suspected noncompliance with meds. Pt reports his weight as 170# and denies weight loss. However, based on past hospital admissions wt November 2018 174#, wt April 2019 169.9# and wt June 2019 159#. Admit weight 170#  (which pt stated was his usual weight) Weighed pt on bed scale: 189# which is not likely accurate. Based on wt hx, pt appears to have lost 16.0# x 8months or 9.1% Pt does not appear to be 189# and  no noted edema. Pt with noted moderate occipital/temporal wasting as well as moderate shoulder wasting.  Based on recall pt states he was eating toast/egg for breakfast, sandwich for lunch and pasta fagiloi for dinner. Pt cooks/prepares meals and he is mindful of  sodium. Pt has been eating very well and has even requested additional food several times because pt was "starving." Accuracy of recall questionable: noted in  medical record pt reported not remembering if he took medication. Based on weight loss and moderate muscle/fat wasting, pt meets criteria for mild protein-calorie malnutrition likley related to depression and/or dementia. Continue current diet DASH/TLC and  recommend supplement (fortfied ice cream BID) Pt is a  79 yo male with hx of anxiety, frequent ER visits for SOB, anxiety admitted with of SOB/ increased anxiety. Pt found to have hypertensive urgency with suspected noncompliance with meds. Pt reports his weight as 170# and denies weight loss. However, based on past hospital admissions wt November 2018 174#, wt April 2019 169.9# and wt June 2019 159#. Admit weight 170#  (which pt stated was his usual weight) Weighed pt on bed scale: 189# which is not likely accurate. Based on wt hx, pt appears to have lost 16.0# x 8months or 9.1% Pt does not appear to be 189# and  no noted edema. Pt with noted moderate occipital/temporal wasting as well as moderate shoulder wasting.  Based on recall pt states he was eating toast/egg for breakfast, sandwich for lunch and pasta fagiloi for dinner. Pt cooks/prepares meals and he is mindful of  sodium. Pt has been eating very well and has even requested additional food several times because pt was "starving." Pt had hypokalemia and trace ketones in urine. Accuracy of recall questionable: noted in  medical record pt reported not remembering if he took medication. Based on weight loss and moderate muscle/fat wasting, pt meets criteria for mild protein-calorie malnutrition likely related to depression (death of spouse 3 yrs ago) and/or dementia. Continue current diet DASH/TLC and  recommend supplement (fortfied ice cream BID)

## 2019-07-25 NOTE — GOALS OF CARE CONVERSATION - PERSONAL ADVANCE DIRECTIVE - CONVERSATION DETAILS
Spoke to pt about completing a HCP. He has 4 sons but states they are not very involved in his life. He wants ao ask his neighbor before naming him as his health care agent. PC will follow up

## 2019-07-25 NOTE — PROGRESS NOTE BEHAVIORAL HEALTH - NSBHCONSULTFOLLOWAFTERCARE_PSY_A_CORE FT
Patient needs help in caring for his HTN and would benefit from home services if his children are unable to help him especially due to mild dementia that has developed. Patient needs help in caring for himself and needs a safe discharge.  The family would need to help care for him or he would need assisted living.

## 2019-07-26 LAB
ANION GAP SERPL CALC-SCNC: 6 MMOL/L — SIGNIFICANT CHANGE UP (ref 5–17)
BUN SERPL-MCNC: 9 MG/DL — SIGNIFICANT CHANGE UP (ref 7–23)
CALCIUM SERPL-MCNC: 9.6 MG/DL — SIGNIFICANT CHANGE UP (ref 8.4–10.5)
CHLORIDE SERPL-SCNC: 106 MMOL/L — SIGNIFICANT CHANGE UP (ref 96–108)
CO2 SERPL-SCNC: 27 MMOL/L — SIGNIFICANT CHANGE UP (ref 22–31)
CREAT SERPL-MCNC: 0.94 MG/DL — SIGNIFICANT CHANGE UP (ref 0.5–1.3)
GLUCOSE SERPL-MCNC: 97 MG/DL — SIGNIFICANT CHANGE UP (ref 70–99)
POTASSIUM SERPL-MCNC: 3.8 MMOL/L — SIGNIFICANT CHANGE UP (ref 3.5–5.3)
POTASSIUM SERPL-SCNC: 3.8 MMOL/L — SIGNIFICANT CHANGE UP (ref 3.5–5.3)
SODIUM SERPL-SCNC: 139 MMOL/L — SIGNIFICANT CHANGE UP (ref 135–145)

## 2019-07-26 PROCEDURE — 99232 SBSQ HOSP IP/OBS MODERATE 35: CPT

## 2019-07-26 RX ORDER — QUETIAPINE FUMARATE 200 MG/1
1 TABLET, FILM COATED ORAL
Qty: 30 | Refills: 0
Start: 2019-07-26 | End: 2019-08-24

## 2019-07-26 RX ORDER — MIRTAZAPINE 45 MG/1
2 TABLET, ORALLY DISINTEGRATING ORAL
Qty: 0 | Refills: 0 | DISCHARGE

## 2019-07-26 RX ORDER — LABETALOL HCL 100 MG
1 TABLET ORAL
Qty: 0 | Refills: 0 | DISCHARGE

## 2019-07-26 RX ORDER — LABETALOL HCL 100 MG
1 TABLET ORAL
Qty: 60 | Refills: 0
Start: 2019-07-26 | End: 2019-08-24

## 2019-07-26 RX ORDER — HYDRALAZINE HCL 50 MG
1 TABLET ORAL
Qty: 90 | Refills: 0
Start: 2019-07-26 | End: 2019-08-24

## 2019-07-26 RX ORDER — TIOTROPIUM BROMIDE 18 UG/1
1 CAPSULE ORAL; RESPIRATORY (INHALATION)
Qty: 1 | Refills: 0
Start: 2019-07-26 | End: 2019-08-24

## 2019-07-26 RX ORDER — HYDRALAZINE HCL 50 MG
1 TABLET ORAL
Qty: 0 | Refills: 0 | DISCHARGE

## 2019-07-26 RX ADMIN — Medication 50 MILLIGRAM(S): at 21:03

## 2019-07-26 RX ADMIN — Medication 0.5 MILLIGRAM(S): at 17:13

## 2019-07-26 RX ADMIN — Medication 1 MILLIGRAM(S): at 21:03

## 2019-07-26 RX ADMIN — Medication 200 MILLIGRAM(S): at 06:07

## 2019-07-26 RX ADMIN — AMLODIPINE BESYLATE 10 MILLIGRAM(S): 2.5 TABLET ORAL at 06:06

## 2019-07-26 RX ADMIN — Medication 20 MILLIGRAM(S): at 06:06

## 2019-07-26 RX ADMIN — Medication 50 MILLIGRAM(S): at 06:06

## 2019-07-26 RX ADMIN — Medication 50 MILLIGRAM(S): at 13:04

## 2019-07-26 RX ADMIN — Medication 200 MILLIGRAM(S): at 17:13

## 2019-07-26 RX ADMIN — Medication 0.5 MILLIGRAM(S): at 06:08

## 2019-07-26 RX ADMIN — ENOXAPARIN SODIUM 40 MILLIGRAM(S): 100 INJECTION SUBCUTANEOUS at 13:03

## 2019-07-26 RX ADMIN — Medication 81 MILLIGRAM(S): at 13:03

## 2019-07-26 RX ADMIN — Medication 20 MILLIGRAM(S): at 17:13

## 2019-07-26 RX ADMIN — ESCITALOPRAM OXALATE 20 MILLIGRAM(S): 10 TABLET, FILM COATED ORAL at 13:03

## 2019-07-26 RX ADMIN — QUETIAPINE FUMARATE 25 MILLIGRAM(S): 200 TABLET, FILM COATED ORAL at 17:13

## 2019-07-26 RX ADMIN — TIOTROPIUM BROMIDE 1 CAPSULE(S): 18 CAPSULE ORAL; RESPIRATORY (INHALATION) at 07:06

## 2019-07-26 NOTE — PROGRESS NOTE BEHAVIORAL HEALTH - NSBHFUPINTERVALHXFT_PSY_A_CORE
Patient is exhibiting signs of dementia. Today, patient states that he is planning to go home. His son could not be reached today.  He had been concerned that pt was acting paranoid and had intermittent agitation at home prior to admission.  Pt is calmer on the Seroquel without any excess sedation. Pt does not recall today's date.  He thinks this is August 16th.  He does not know the name of his medications. He does not recall our conversation about his need for home care. Pt lacks capacity to participate in discharge planning and he lacks capacity to care for himself.  He will need a safe discharge.  Patient denies and suicidal ideation or homicidal ideation.

## 2019-07-26 NOTE — PROGRESS NOTE BEHAVIORAL HEALTH - NSBHCONSULTFOLLOWAFTERCARE_PSY_A_CORE FT
Pt will need a safe discharge and someone to help him with his medications since he is unable to recall any of them.

## 2019-07-26 NOTE — PROGRESS NOTE BEHAVIORAL HEALTH - SUMMARY
Patient is a 77 year-old  male, retired, living alone,  since 2016, with a history of depression since his wife passed away three years ago, with no prior / current suicidal ideation, with no history of suicidality, with no prior in-patient hospitalization, no legal history, no history violence/aggression, no substance abuse history admitted to medicine for uncontrolled hypertension. PMH HLD, HTN.      Patient presenting euthymic, reporting periodic depressed mood since the death of his wife. He denies currently feeling depressed. Patient reports being  60 years prior wife passing away and missed her very much.     Patient is oriented to the day of the week, date of the month, year, and season. patient thought it was August, but yesterday patient thought it was november. Patient is tolerating the Seroquel and says he is sleeping much better. Patient has been smiling and laughing. Patient denies any complaints and states that this hospital takes great care of him.     Austin Darnell [phone]   Plan:  Continue   Lexapro 20mg daily  increase Seroquel 50mg po q6pm for mood and agitation at home (as per son).  Seroquel 50mg po q6hrs prn agitation  Zyprexa 2.5mg IM q 6 hrs prn severe agitation.    Continue Klonopin 0.5mg BID and 1mg PRN   MOCA testing: patient score 21, showing signs of moderate dementia with specific issues with recall (no recall for any of the five rehearsed words)  Main concern is having an aid for patient at home since his dementia is progressing.  His son, Austin Fox [phone] called back to say that his father will not listen to his sons and will not go out of the house for any activities.  He understands that his father has dementia and may not be safe at home alone and may not be able to participate in outside activities as he used to participate.  Pt lacks capacity to participate in discharge planning. Patient is a 77 year-old  male, retired, living alone,  since 2016, with a history of depression since his wife passed away three years ago, with no prior / current suicidal ideation, with no history of suicidality, with no prior in-patient hospitalization, no legal history, no history violence/aggression, no substance abuse history admitted to medicine for uncontrolled hypertension. PMH HLD, HTN.      Patient presenting euthymic, reporting periodic depressed mood since the death of his wife. He denies currently feeling depressed. Patient reports being  60 years prior wife passing away and missed her very much.     Patient is not oriented to the day of the week, date of the month, year, and season. patient thought it was August, but yesterday patient thought it was november. Patient is tolerating the Seroquel and says he is sleeping much better. Patient has been smiling and laughing. Patient denies any complaints and states that this hospital takes great care of him.     Austin Darnell [phone]   Plan:  Continue   Lexapro 20mg daily  increase Seroquel 50mg po q6pm for mood and agitation at home (as per son).  Seroquel 50mg po q6hrs prn agitation  Zyprexa 2.5mg IM q 6 hrs prn severe agitation.    Continue Klonopin 0.5mg BID and 1mg PRN   MOCA testing: patient score 21, showing signs of moderate dementia with specific issues with recall (no recall for any of the five rehearsed words)  Main concern is having an aid for patient at home since his dementia is progressing.  His son, Austin Fox [phone] called back to say that his father will not listen to his sons and will not go out of the house for any activities.  He understands that his father has dementia and may not be safe at home alone and may not be able to participate in outside activities as he used to participate.  Pt lacks capacity to participate in discharge planning.

## 2019-07-27 RX ADMIN — Medication 0.5 MILLIGRAM(S): at 05:28

## 2019-07-27 RX ADMIN — QUETIAPINE FUMARATE 25 MILLIGRAM(S): 200 TABLET, FILM COATED ORAL at 17:09

## 2019-07-27 RX ADMIN — Medication 0.5 MILLIGRAM(S): at 17:09

## 2019-07-27 RX ADMIN — Medication 200 MILLIGRAM(S): at 17:09

## 2019-07-27 RX ADMIN — Medication 81 MILLIGRAM(S): at 11:08

## 2019-07-27 RX ADMIN — AMLODIPINE BESYLATE 10 MILLIGRAM(S): 2.5 TABLET ORAL at 05:30

## 2019-07-27 RX ADMIN — Medication 20 MILLIGRAM(S): at 05:30

## 2019-07-27 RX ADMIN — TIOTROPIUM BROMIDE 1 CAPSULE(S): 18 CAPSULE ORAL; RESPIRATORY (INHALATION) at 06:44

## 2019-07-27 RX ADMIN — ENOXAPARIN SODIUM 40 MILLIGRAM(S): 100 INJECTION SUBCUTANEOUS at 11:08

## 2019-07-27 RX ADMIN — Medication 1 MILLIGRAM(S): at 21:12

## 2019-07-27 RX ADMIN — Medication 50 MILLIGRAM(S): at 05:29

## 2019-07-27 RX ADMIN — Medication 20 MILLIGRAM(S): at 17:08

## 2019-07-27 RX ADMIN — Medication 50 MILLIGRAM(S): at 13:31

## 2019-07-27 RX ADMIN — Medication 200 MILLIGRAM(S): at 05:29

## 2019-07-27 RX ADMIN — ESCITALOPRAM OXALATE 20 MILLIGRAM(S): 10 TABLET, FILM COATED ORAL at 11:08

## 2019-07-27 RX ADMIN — Medication 50 MILLIGRAM(S): at 21:12

## 2019-07-28 RX ADMIN — Medication 20 MILLIGRAM(S): at 18:45

## 2019-07-28 RX ADMIN — Medication 81 MILLIGRAM(S): at 08:32

## 2019-07-28 RX ADMIN — Medication 50 MILLIGRAM(S): at 21:32

## 2019-07-28 RX ADMIN — AMLODIPINE BESYLATE 10 MILLIGRAM(S): 2.5 TABLET ORAL at 05:51

## 2019-07-28 RX ADMIN — ENOXAPARIN SODIUM 40 MILLIGRAM(S): 100 INJECTION SUBCUTANEOUS at 08:31

## 2019-07-28 RX ADMIN — Medication 50 MILLIGRAM(S): at 13:28

## 2019-07-28 RX ADMIN — Medication 0.5 MILLIGRAM(S): at 08:31

## 2019-07-28 RX ADMIN — QUETIAPINE FUMARATE 25 MILLIGRAM(S): 200 TABLET, FILM COATED ORAL at 18:45

## 2019-07-28 RX ADMIN — Medication 0.5 MILLIGRAM(S): at 18:45

## 2019-07-28 RX ADMIN — Medication 200 MILLIGRAM(S): at 18:45

## 2019-07-28 RX ADMIN — Medication 1 MILLIGRAM(S): at 21:31

## 2019-07-28 RX ADMIN — TIOTROPIUM BROMIDE 1 CAPSULE(S): 18 CAPSULE ORAL; RESPIRATORY (INHALATION) at 05:52

## 2019-07-28 RX ADMIN — Medication 200 MILLIGRAM(S): at 05:51

## 2019-07-28 RX ADMIN — Medication 50 MILLIGRAM(S): at 05:51

## 2019-07-28 RX ADMIN — Medication 20 MILLIGRAM(S): at 05:51

## 2019-07-28 RX ADMIN — ESCITALOPRAM OXALATE 20 MILLIGRAM(S): 10 TABLET, FILM COATED ORAL at 08:32

## 2019-07-28 NOTE — CHART NOTE - NSCHARTNOTEFT_GEN_A_CORE
Assessment:   Pt is a  77 yo male with hx of anxiety, frequent ER visits for SOB, anxiety admitted with of SOB/ increased anxiety. Pt found to have hypertensive urgency with suspected noncompliance with meds. Based on weight loss, questionable po intake PTA and moderate muscle/fat wasting, pt meets criteria for mild protein-calorie malnutrition likely related to depression (death of spouse 3 yrs ago) and/or dementia.       Factors impacting intake: [ ] none [ ] nausea  [ ] vomiting [ ] diarrhea [ ] constipation  [ ]chewing problems [ ] swallowing issues  [ ] other:     Diet Presciption: Diet, Regular:   DASH/TLC {Sodium & Cholesterol Restricted} (07-24-19 @ 12:21)    Intake: 75% or >    Current Weight: Weight (kg): 77.1 (07-24 @ 07:34)  % Weight Change    Pertinent Medications: MEDICATIONS  (STANDING):  amLODIPine   Tablet 10 milliGRAM(s) Oral daily  aspirin enteric coated 81 milliGRAM(s) Oral daily  clonazePAM  Tablet 0.5 milliGRAM(s) Oral two times a day  doxazosin 1 milliGRAM(s) Oral at bedtime  enalapril 20 milliGRAM(s) Oral two times a day  enoxaparin Injectable 40 milliGRAM(s) SubCutaneous daily  escitalopram 20 milliGRAM(s) Oral daily  hydrALAZINE 50 milliGRAM(s) Oral every 8 hours  labetalol 200 milliGRAM(s) Oral two times a day  QUEtiapine 25 milliGRAM(s) Oral <User Schedule>  tiotropium 18 MICROgram(s) Capsule 1 Capsule(s) Inhalation daily    MEDICATIONS  (PRN):  ALBUTerol    90 MICROgram(s) HFA Inhaler 2 Puff(s) Inhalation every 6 hours PRN Shortness of Breath and/or Wheezing    Pertinent Labs: 07-26 Na139 mmol/L Glu 97 mg/dL K+ 3.8 mmol/L Cr  0.94 mg/dL BUN 9 mg/dL 07-24 Alb 3.5 g/dL     CAPILLARY BLOOD GLUCOSE        Skin:     Estimated Needs:   [x ] no change since previous assessment  [ ] recalculated:     Previous Nutrition Diagnosis:   [ ] Inadequate Energy Intake [ ]Inadequate Oral Intake [ ] Excessive Energy Intake   [ ] Underweight [ ] Increased Nutrient Needs [ ] Overweight/Obesity   [ ] Altered GI Function [ ] Unintended Weight Loss [ ] Food & Nutrition Related Knowledge Deficit [x ] Malnutrition     Nutrition Diagnosis is [x ] ongoing  [ ] resolved [ ] not applicable     New Nutrition Diagnosis: [ ] not applicable       Interventions:   continue POC  Recommend  [ ] Change Diet To:  [ ] Nutrition Supplement  [ ] Nutrition Support  [ ] Other:     Monitoring and Evaluation:   [ ] PO intake [ x ] Tolerance to diet prescription [ x ] weights [ x ] labs[ x ] follow up per protocol  [ ] other: Assessment:   Pt is a  77 yo male with hx of anxiety, frequent ER visits for SOB, anxiety admitted with of SOB/ increased anxiety. Pt found to have hypertensive urgency with suspected noncompliance with meds. Based on weight loss, questionable po intake PTA and moderate muscle/fat wasting, pt meets criteria for mild protein-calorie malnutrition likely related to depression (death of spouse 3 yrs ago) and/or dementia. Pt continues to have good appetite and is tolerating meals without trouble. As per Psychiatrist bryan, pt does not have capacity to go home alone. At this time it appears pt is medically improved but requires a safe discharge plan.       Factors impacting intake: [x ] none [ ] nausea  [ ] vomiting [ ] diarrhea [ ] constipation  [ ]chewing problems [ ] swallowing issues  [ ] other:     Diet Presciption: Diet, Regular:   DASH/TLC {Sodium & Cholesterol Restricted} (07-24-19 @ 12:21)    Intake: 75% or >    Current Weight: Weight (kg): 77.1 (07-24 @ 07:34)  % Weight Change    Pertinent Medications: MEDICATIONS  (STANDING):  amLODIPine   Tablet 10 milliGRAM(s) Oral daily  aspirin enteric coated 81 milliGRAM(s) Oral daily  clonazePAM  Tablet 0.5 milliGRAM(s) Oral two times a day  doxazosin 1 milliGRAM(s) Oral at bedtime  enalapril 20 milliGRAM(s) Oral two times a day  enoxaparin Injectable 40 milliGRAM(s) SubCutaneous daily  escitalopram 20 milliGRAM(s) Oral daily  hydrALAZINE 50 milliGRAM(s) Oral every 8 hours  labetalol 200 milliGRAM(s) Oral two times a day  QUEtiapine 25 milliGRAM(s) Oral <User Schedule>  tiotropium 18 MICROgram(s) Capsule 1 Capsule(s) Inhalation daily    MEDICATIONS  (PRN):  ALBUTerol    90 MICROgram(s) HFA Inhaler 2 Puff(s) Inhalation every 6 hours PRN Shortness of Breath and/or Wheezing    Pertinent Labs: 07-26 Na139 mmol/L Glu 97 mg/dL K+ 3.8 mmol/L Cr  0.94 mg/dL BUN 9 mg/dL 07-24 Alb 3.5 g/dL     CAPILLARY BLOOD GLUCOSE        Skin:     Estimated Needs:   [x ] no change since previous assessment  [ ] recalculated:     Previous Nutrition Diagnosis:   [ ] Inadequate Energy Intake [ ]Inadequate Oral Intake [ ] Excessive Energy Intake   [ ] Underweight [ ] Increased Nutrient Needs [ ] Overweight/Obesity   [ ] Altered GI Function [ ] Unintended Weight Loss [ ] Food & Nutrition Related Knowledge Deficit [x ] Malnutrition     Nutrition Diagnosis is [x ] ongoing  [ ] resolved [ ] not applicable     New Nutrition Diagnosis: [ ] not applicable       Interventions:   continue POC  Recommend  [ ] Change Diet To:  [ ] Nutrition Supplement  [ ] Nutrition Support  [ ] Other:     Monitoring and Evaluation:   [ ] PO intake [ x ] Tolerance to diet prescription [ x ] weights [ x ] labs[ x ] follow up per protocol  [ ] other: Assessment:   Pt is a  77 yo male with hx of anxiety, frequent ER visits for SOB, anxiety admitted with of SOB/ increased anxiety. Pt found to have hypertensive urgency with suspected noncompliance with meds. Based on weight loss, questionable po intake PTA and moderate muscle/fat wasting, pt meets criteria for mild protein-calorie malnutrition likely related to depression (death of spouse 3 yrs ago) and/or dementia. Pt continues to have good appetite and is tolerating meals without trouble. As per Psychiatrist bryan, pt does not have capacity to go home alone. At this time it appears pt is medically improved but requires a safe discharge plan.       Factors impacting intake: [x ] none [ ] nausea  [ ] vomiting [ ] diarrhea [ ] constipation  [ ]chewing problems [ ] swallowing issues  [ ] other:     Diet Presciption: Diet, Regular:   DASH/TLC {Sodium & Cholesterol Restricted} (07-24-19 @ 12:21)    Intake: 75% or >    Current Weight: Weight (kg): 77.1 (07-24 @ 07:34)  % Weight Change  no new wts    Pertinent Medications: MEDICATIONS  (STANDING):  amLODIPine   Tablet 10 milliGRAM(s) Oral daily  aspirin enteric coated 81 milliGRAM(s) Oral daily  clonazePAM  Tablet 0.5 milliGRAM(s) Oral two times a day  doxazosin 1 milliGRAM(s) Oral at bedtime  enalapril 20 milliGRAM(s) Oral two times a day  enoxaparin Injectable 40 milliGRAM(s) SubCutaneous daily  escitalopram 20 milliGRAM(s) Oral daily  hydrALAZINE 50 milliGRAM(s) Oral every 8 hours  labetalol 200 milliGRAM(s) Oral two times a day  QUEtiapine 25 milliGRAM(s) Oral <User Schedule>  tiotropium 18 MICROgram(s) Capsule 1 Capsule(s) Inhalation daily    MEDICATIONS  (PRN):  ALBUTerol    90 MICROgram(s) HFA Inhaler 2 Puff(s) Inhalation every 6 hours PRN Shortness of Breath and/or Wheezing    Pertinent Labs: 07-26 Na139 mmol/L Glu 97 mg/dL K+ 3.8 mmol/L Cr  0.94 mg/dL BUN 9 mg/dL 07-24 Alb 3.5 g/dL     CAPILLARY BLOOD GLUCOSE        Skin:     Estimated Needs:   [x ] no change since previous assessment  [ ] recalculated:     Previous Nutrition Diagnosis:   [ ] Inadequate Energy Intake [ ]Inadequate Oral Intake [ ] Excessive Energy Intake   [ ] Underweight [ ] Increased Nutrient Needs [ ] Overweight/Obesity   [ ] Altered GI Function [ ] Unintended Weight Loss [ ] Food & Nutrition Related Knowledge Deficit [x ] Malnutrition     Nutrition Diagnosis is [x ] ongoing  [ ] resolved [ ] not applicable     New Nutrition Diagnosis: [ ] not applicable       Interventions:   continue POC  Recommend  [ ] Change Diet To:  [ ] Nutrition Supplement  [ ] Nutrition Support  [ ] Other:     Monitoring and Evaluation:   [ ] PO intake [ x ] Tolerance to diet prescription [ x ] weights [ x ] labs[ x ] follow up per protocol  [ ] other: Assessment:   Pt is a  79 yo male with hx of anxiety, frequent ER visits for SOB, anxiety admitted with of SOB/ increased anxiety. Pt found to have hypertensive urgency with suspected noncompliance with meds. Based on weight loss, questionable po intake PTA and moderate muscle/fat wasting, pt meets criteria for mild protein-calorie malnutrition likely related to depression (death of spouse 3 yrs ago) and/or dementia. Pt continues to have good appetite and is tolerating meals without trouble. As per Psychiatrist bryan, pt does not have capacity to go home alone. At this time it appears pt is medically improved but requires a safe discharge plan.       Factors impacting intake: [x ] none [ ] nausea  [ ] vomiting [ ] diarrhea [ ] constipation  [ ]chewing problems [ ] swallowing issues  [ ] other:     Diet Presciption: Diet, Regular:   DASH/TLC {Sodium & Cholesterol Restricted} (07-24-19 @ 12:21)    Intake: 75% or >    Current Weight: Weight (kg): 77.1 (07-24 @ 07:34)  % Weight Change  no new wts    Pertinent Medications: MEDICATIONS  (STANDING):  amLODIPine   Tablet 10 milliGRAM(s) Oral daily  aspirin enteric coated 81 milliGRAM(s) Oral daily  clonazePAM  Tablet 0.5 milliGRAM(s) Oral two times a day  doxazosin 1 milliGRAM(s) Oral at bedtime  enalapril 20 milliGRAM(s) Oral two times a day  enoxaparin Injectable 40 milliGRAM(s) SubCutaneous daily  escitalopram 20 milliGRAM(s) Oral daily  hydrALAZINE 50 milliGRAM(s) Oral every 8 hours  labetalol 200 milliGRAM(s) Oral two times a day  QUEtiapine 25 milliGRAM(s) Oral <User Schedule>  tiotropium 18 MICROgram(s) Capsule 1 Capsule(s) Inhalation daily    MEDICATIONS  (PRN):  ALBUTerol    90 MICROgram(s) HFA Inhaler 2 Puff(s) Inhalation every 6 hours PRN Shortness of Breath and/or Wheezing    Pertinent Labs: 07-26 Na139 mmol/L Glu 97 mg/dL K+ 3.8 mmol/L Cr  0.94 mg/dL BUN 9 mg/dL 07-24 Alb 3.5 g/dL     CAPILLARY BLOOD GLUCOSE        Skin: intact    Estimated Needs:   [x ] no change since previous assessment  [ ] recalculated:     Previous Nutrition Diagnosis:   [ ] Inadequate Energy Intake [ ]Inadequate Oral Intake [ ] Excessive Energy Intake   [ ] Underweight [ ] Increased Nutrient Needs [ ] Overweight/Obesity   [ ] Altered GI Function [ ] Unintended Weight Loss [ ] Food & Nutrition Related Knowledge Deficit [x ] Malnutrition     Nutrition Diagnosis is [x ] ongoing  [ ] resolved [ ] not applicable     New Nutrition Diagnosis: [ ] not applicable       Interventions:   continue POC  Recommend  [ ] Change Diet To:  [ ] Nutrition Supplement  [ ] Nutrition Support  [ ] Other:     Monitoring and Evaluation:   [ ] PO intake [ x ] Tolerance to diet prescription [ x ] weights [ x ] labs[ x ] follow up per protocol  [ ] other:

## 2019-07-29 LAB
ANION GAP SERPL CALC-SCNC: 7 MMOL/L — SIGNIFICANT CHANGE UP (ref 5–17)
BUN SERPL-MCNC: 12 MG/DL — SIGNIFICANT CHANGE UP (ref 7–23)
CALCIUM SERPL-MCNC: 9.8 MG/DL — SIGNIFICANT CHANGE UP (ref 8.4–10.5)
CHLORIDE SERPL-SCNC: 105 MMOL/L — SIGNIFICANT CHANGE UP (ref 96–108)
CO2 SERPL-SCNC: 27 MMOL/L — SIGNIFICANT CHANGE UP (ref 22–31)
CREAT SERPL-MCNC: 1.1 MG/DL — SIGNIFICANT CHANGE UP (ref 0.5–1.3)
GLUCOSE SERPL-MCNC: 97 MG/DL — SIGNIFICANT CHANGE UP (ref 70–99)
HCT VFR BLD CALC: 37 % — LOW (ref 39–50)
HGB BLD-MCNC: 11.8 G/DL — LOW (ref 13–17)
MCHC RBC-ENTMCNC: 24.9 PG — LOW (ref 27–34)
MCHC RBC-ENTMCNC: 31.9 GM/DL — LOW (ref 32–36)
MCV RBC AUTO: 78.2 FL — LOW (ref 80–100)
NRBC # BLD: 0 /100 WBCS — SIGNIFICANT CHANGE UP (ref 0–0)
PLATELET # BLD AUTO: 266 K/UL — SIGNIFICANT CHANGE UP (ref 150–400)
POTASSIUM SERPL-MCNC: 4 MMOL/L — SIGNIFICANT CHANGE UP (ref 3.5–5.3)
POTASSIUM SERPL-SCNC: 4 MMOL/L — SIGNIFICANT CHANGE UP (ref 3.5–5.3)
RBC # BLD: 4.73 M/UL — SIGNIFICANT CHANGE UP (ref 4.2–5.8)
RBC # FLD: 15.9 % — HIGH (ref 10.3–14.5)
SODIUM SERPL-SCNC: 139 MMOL/L — SIGNIFICANT CHANGE UP (ref 135–145)
WBC # BLD: 6.66 K/UL — SIGNIFICANT CHANGE UP (ref 3.8–10.5)
WBC # FLD AUTO: 6.66 K/UL — SIGNIFICANT CHANGE UP (ref 3.8–10.5)

## 2019-07-29 PROCEDURE — 99231 SBSQ HOSP IP/OBS SF/LOW 25: CPT

## 2019-07-29 RX ADMIN — QUETIAPINE FUMARATE 25 MILLIGRAM(S): 200 TABLET, FILM COATED ORAL at 17:21

## 2019-07-29 RX ADMIN — Medication 0.5 MILLIGRAM(S): at 08:08

## 2019-07-29 RX ADMIN — Medication 20 MILLIGRAM(S): at 05:56

## 2019-07-29 RX ADMIN — TIOTROPIUM BROMIDE 1 CAPSULE(S): 18 CAPSULE ORAL; RESPIRATORY (INHALATION) at 05:57

## 2019-07-29 RX ADMIN — Medication 20 MILLIGRAM(S): at 17:21

## 2019-07-29 RX ADMIN — Medication 200 MILLIGRAM(S): at 05:57

## 2019-07-29 RX ADMIN — Medication 81 MILLIGRAM(S): at 12:30

## 2019-07-29 RX ADMIN — Medication 50 MILLIGRAM(S): at 05:57

## 2019-07-29 RX ADMIN — ENOXAPARIN SODIUM 40 MILLIGRAM(S): 100 INJECTION SUBCUTANEOUS at 12:30

## 2019-07-29 RX ADMIN — Medication 50 MILLIGRAM(S): at 14:32

## 2019-07-29 RX ADMIN — ESCITALOPRAM OXALATE 20 MILLIGRAM(S): 10 TABLET, FILM COATED ORAL at 12:30

## 2019-07-29 RX ADMIN — Medication 200 MILLIGRAM(S): at 17:21

## 2019-07-29 RX ADMIN — Medication 50 MILLIGRAM(S): at 21:25

## 2019-07-29 RX ADMIN — Medication 0.5 MILLIGRAM(S): at 17:21

## 2019-07-29 RX ADMIN — AMLODIPINE BESYLATE 10 MILLIGRAM(S): 2.5 TABLET ORAL at 05:57

## 2019-07-29 RX ADMIN — Medication 1 MILLIGRAM(S): at 21:25

## 2019-07-29 NOTE — PROGRESS NOTE BEHAVIORAL HEALTH - NSBHFUPINTERVALHXFT_PSY_A_CORE
Patient is exhibiting signs of dementia. Today, patient states that he is planning to go home. His son could not be reached today.  He had been concerned that pt was acting paranoid and had intermittent agitation at home prior to admission.  Pt is calmer on the Seroquel without any excess sedation. Pt does not recall today's date.  He thinks the month is November.  He does not know the name of his medications. He does not recall our conversation about his need for home care. Pt lacks capacity to participate in discharge planning and he lacks capacity to care for himself.  He will need a safe discharge.  Patient denies and suicidal ideation or homicidal ideation.

## 2019-07-29 NOTE — PROGRESS NOTE BEHAVIORAL HEALTH - SUMMARY
Patient is a 77 year-old  male, retired, living alone,  since 2016, with a history of depression since his wife passed away three years ago, with no prior / current suicidal ideation, with no history of suicidality, with no prior in-patient hospitalization, no legal history, no history violence/aggression, no substance abuse history admitted to medicine for uncontrolled hypertension. PMH HLD, HTN.      Patient presenting euthymic, reporting periodic depressed mood since the death of his wife. He denies currently feeling depressed. Patient reports being  60 years prior wife passing away and missed her very much.     Patient is not oriented to the day of the week, date of the month, year, and season. patient thought it was August, but yesterday patient thought it was november. Patient is tolerating the Seroquel and says he is sleeping much better. Patient has been smiling and laughing. Patient denies any complaints and states that this hospital takes great care of him.     Austin Darnell [phone]   Plan:  Continue   Lexapro 20mg daily  increase Seroquel 50mg po q6pm for mood and agitation at home (as per son).  Seroquel 50mg po q6hrs prn agitation  Zyprexa 2.5mg IM q 6 hrs prn severe agitation.    Continue Klonopin 0.5mg BID and 1mg PRN   MOCA testing: patient score 21, showing signs of moderate dementia with specific issues with recall (no recall for any of the five rehearsed words)  Main concern is having an aid for patient at home since his dementia is progressing.  His son, Austin Fox [phone] called back to say that his father will not listen to his sons and will not go out of the house for any activities.  He understands that his father has dementia and may not be safe at home alone and may not be able to participate in outside activities as he used to participate.  Pt lacks capacity to participate in discharge planning.

## 2019-07-30 PROCEDURE — 99231 SBSQ HOSP IP/OBS SF/LOW 25: CPT

## 2019-07-30 RX ADMIN — AMLODIPINE BESYLATE 10 MILLIGRAM(S): 2.5 TABLET ORAL at 10:37

## 2019-07-30 RX ADMIN — Medication 50 MILLIGRAM(S): at 21:13

## 2019-07-30 RX ADMIN — Medication 50 MILLIGRAM(S): at 06:00

## 2019-07-30 RX ADMIN — Medication 1 MILLIGRAM(S): at 21:13

## 2019-07-30 RX ADMIN — TIOTROPIUM BROMIDE 1 CAPSULE(S): 18 CAPSULE ORAL; RESPIRATORY (INHALATION) at 07:18

## 2019-07-30 RX ADMIN — Medication 200 MILLIGRAM(S): at 17:48

## 2019-07-30 RX ADMIN — Medication 50 MILLIGRAM(S): at 13:30

## 2019-07-30 RX ADMIN — Medication 81 MILLIGRAM(S): at 12:36

## 2019-07-30 RX ADMIN — Medication 20 MILLIGRAM(S): at 17:48

## 2019-07-30 RX ADMIN — Medication 0.5 MILLIGRAM(S): at 17:48

## 2019-07-30 RX ADMIN — ENOXAPARIN SODIUM 40 MILLIGRAM(S): 100 INJECTION SUBCUTANEOUS at 12:36

## 2019-07-30 RX ADMIN — Medication 0.5 MILLIGRAM(S): at 06:00

## 2019-07-30 RX ADMIN — ESCITALOPRAM OXALATE 20 MILLIGRAM(S): 10 TABLET, FILM COATED ORAL at 12:36

## 2019-07-30 RX ADMIN — QUETIAPINE FUMARATE 25 MILLIGRAM(S): 200 TABLET, FILM COATED ORAL at 17:48

## 2019-07-30 RX ADMIN — Medication 20 MILLIGRAM(S): at 10:37

## 2019-07-30 RX ADMIN — Medication 200 MILLIGRAM(S): at 06:00

## 2019-07-30 NOTE — PROGRESS NOTE BEHAVIORAL HEALTH - SUMMARY
Patient is a 77 year-old  male, retired, living alone,  since 2016, with a history of depression since his wife passed away three years ago, with no prior / current suicidal ideation, with no history of suicidality, with no prior in-patient hospitalization, no legal history, no history violence/aggression, no substance abuse history admitted to medicine for uncontrolled hypertension. PMH HLD, HTN.      Patient presenting euthymic, reporting periodic depressed mood since the death of his wife. He denies currently feeling depressed. Patient reports being  60 years prior wife passing away and missed her very much.     Patient is not oriented to the day of the week, date of the month, year, and season. patient thought it was August, but yesterday patient thought it was november. Patient is tolerating the Seroquel and says he is sleeping much better. Patient has been smiling and laughing. Patient denies any complaints and states that this hospital takes great care of him.     Austin Darnell [phone]   Plan:  Continue   Lexapro 20mg daily  increase Seroquel 50mg po q6pm for mood and agitation at home (as per son).  Seroquel 50mg po q6hrs prn agitation  Zyprexa 2.5mg IM q 6 hrs prn severe agitation.    Continue Klonopin 0.5mg BID and 1mg PRN   MOCA testing: patient score 21, showing signs of moderate dementia with specific issues with recall (no recall for any of the five rehearsed words)  Main concern is having an aid for patient at home since his dementia is progressing.  His son, Austin Fox [phone] called back to say that his father will not listen to his sons and will not go out of the house for any activities.  He understands that his father has dementia and may not be safe at home alone and may not be able to participate in outside activities as he used to participate.  Pt lacks capacity to participate in discharge planning.

## 2019-07-30 NOTE — PROGRESS NOTE BEHAVIORAL HEALTH - NSBHFUPINTERVALHXFT_PSY_A_CORE
Patient is exhibiting signs of dementia. Pt is now accepting that he will need help at home and is not asking to leave today.  His son could not be reached today.  He had been concerned that pt was acting paranoid and had intermittent agitation at home prior to admission.  Pt is calmer on the Seroquel without any excess sedation. Pt does not recall today's date.  He thinks the month is November.  He does not know the name of his medications. He does not recall our conversation about his need for home care. Pt lacks capacity to participate in discharge planning and he lacks capacity to care for himself.  He will need a safe discharge.  Patient denies and suicidal ideation or homicidal ideation.

## 2019-07-31 PROCEDURE — 99231 SBSQ HOSP IP/OBS SF/LOW 25: CPT

## 2019-07-31 RX ORDER — QUETIAPINE FUMARATE 200 MG/1
50 TABLET, FILM COATED ORAL DAILY
Refills: 0 | Status: DISCONTINUED | OUTPATIENT
Start: 2019-07-31 | End: 2019-08-03

## 2019-07-31 RX ADMIN — Medication 20 MILLIGRAM(S): at 05:54

## 2019-07-31 RX ADMIN — Medication 1 MILLIGRAM(S): at 22:20

## 2019-07-31 RX ADMIN — Medication 50 MILLIGRAM(S): at 05:54

## 2019-07-31 RX ADMIN — Medication 200 MILLIGRAM(S): at 05:54

## 2019-07-31 RX ADMIN — TIOTROPIUM BROMIDE 1 CAPSULE(S): 18 CAPSULE ORAL; RESPIRATORY (INHALATION) at 06:54

## 2019-07-31 RX ADMIN — Medication 0.5 MILLIGRAM(S): at 05:53

## 2019-07-31 RX ADMIN — AMLODIPINE BESYLATE 10 MILLIGRAM(S): 2.5 TABLET ORAL at 05:54

## 2019-07-31 RX ADMIN — ENOXAPARIN SODIUM 40 MILLIGRAM(S): 100 INJECTION SUBCUTANEOUS at 11:10

## 2019-07-31 RX ADMIN — Medication 0.5 MILLIGRAM(S): at 17:28

## 2019-07-31 RX ADMIN — Medication 50 MILLIGRAM(S): at 14:18

## 2019-07-31 RX ADMIN — Medication 81 MILLIGRAM(S): at 11:10

## 2019-07-31 RX ADMIN — Medication 50 MILLIGRAM(S): at 22:20

## 2019-07-31 RX ADMIN — QUETIAPINE FUMARATE 50 MILLIGRAM(S): 200 TABLET, FILM COATED ORAL at 17:28

## 2019-07-31 RX ADMIN — Medication 200 MILLIGRAM(S): at 17:28

## 2019-07-31 RX ADMIN — Medication 20 MILLIGRAM(S): at 17:28

## 2019-07-31 RX ADMIN — ESCITALOPRAM OXALATE 20 MILLIGRAM(S): 10 TABLET, FILM COATED ORAL at 11:10

## 2019-07-31 NOTE — CHART NOTE - NSCHARTNOTEFT_GEN_A_CORE
Assessment:       Pt is a  77 yo male with hx of anxiety, frequent ER visits for SOB, anxiety admitted with of SOB/ increased anxiety. Pt found to have hypertensive urgency with suspected noncompliance with meds. Based on weight loss, questionable po intake PTA and moderate muscle/fat wasting, pt meets criteria for mild protein-calorie malnutrition likely related to depression (death of spouse 3 yrs ago) and/or dementia. Pt continues to have good appetite and is tolerating meals without trouble. As per Psychiatrist eval pt showing signs of dementia and does not have capacity to go home alone. At this time it appears pt is medically improved but requires a safe discharge plan.           Factors impacting intake: [x ] none [ ] nausea  [ ] vomiting [ ] diarrhea [ ] constipation  [ ]chewing problems [ ] swallowing issues  [ ] other:     Diet Presciption: Diet, Regular:   DASH/TLC {Sodium & Cholesterol Restricted} (07-24-19 @ 12:21)    Intake: 100%    Current Weight: 169.9# no significant change  % Weight Change    Pertinent Medications: MEDICATIONS  (STANDING):  amLODIPine   Tablet 10 milliGRAM(s) Oral daily  aspirin enteric coated 81 milliGRAM(s) Oral daily  clonazePAM  Tablet 0.5 milliGRAM(s) Oral two times a day  doxazosin 1 milliGRAM(s) Oral at bedtime  enalapril 20 milliGRAM(s) Oral two times a day  enoxaparin Injectable 40 milliGRAM(s) SubCutaneous daily  escitalopram 20 milliGRAM(s) Oral daily  hydrALAZINE 50 milliGRAM(s) Oral every 8 hours  labetalol 200 milliGRAM(s) Oral two times a day  QUEtiapine 25 milliGRAM(s) Oral <User Schedule>  tiotropium 18 MICROgram(s) Capsule 1 Capsule(s) Inhalation daily    MEDICATIONS  (PRN):  ALBUTerol    90 MICROgram(s) HFA Inhaler 2 Puff(s) Inhalation every 6 hours PRN Shortness of Breath and/or Wheezing    Pertinent Labs: 07-29 Na139 mmol/L Glu 97 mg/dL K+ 4.0 mmol/L Cr  1.10 mg/dL BUN 12 mg/dL     CAPILLARY BLOOD GLUCOSE        Skin:     Estimated Needs:   [x ] no change since previous assessment  [ ] recalculated:     Previous Nutrition Diagnosis:   [ ] Inadequate Energy Intake [ ]Inadequate Oral Intake [ ] Excessive Energy Intake   [ ] Underweight [ ] Increased Nutrient Needs [ ] Overweight/Obesity   [ ] Altered GI Function [ ] Unintended Weight Loss [ ] Food & Nutrition Related Knowledge Deficit [x ] Malnutrition     Nutrition Diagnosis is [x ] ongoing  [ ] resolved [ ] not applicable     New Nutrition Diagnosis: [ ] not applicable       Interventions:   Recommend   continue nutrition POC  [ ] Change Diet To:  [ ] Nutrition Supplement  [ ] Nutrition Support  [ ] Other:     Monitoring and Evaluation:   [x ] PO intake [ x ] Tolerance to diet prescription [ x ] weights [ x ] labs[ x ] follow up per protocol  [ ] other:

## 2019-07-31 NOTE — PROGRESS NOTE BEHAVIORAL HEALTH - SUMMARY
Patient is a 77 year-old  male, retired, living alone,  since 2016, with a history of depression since his wife passed away three years ago, with no prior / current suicidal ideation, with no history of suicidality, with no prior in-patient hospitalization, no legal history, no history violence/aggression, no substance abuse history admitted to medicine for uncontrolled hypertension. PMH HLD, HTN.      Patient presenting euthymic, reporting periodic depressed mood since the death of his wife. He denies currently feeling depressed. Patient reports being  60 years prior wife passing away and missed her very much.     Patient is not oriented to the day of the week, date of the month, year, and season. patient thought it was August, but yesterday patient thought it was november. Patient is tolerating the Seroquel and says he is sleeping much better. Patient has been smiling and laughing. Patient denies any complaints and states that this hospital takes great care of him.     Austin Darnell [phone]   Plan:  Continue   Lexapro 20mg daily  increase Seroquel 50mg po q6pm for mood and agitation at home (as per son).  Seroquel 50mg po q6hrs prn agitation  Zyprexa 2.5mg IM q 6 hrs prn severe agitation.    Continue Klonopin 0.5mg BID and 1mg PRN   MOCA testing: patient score 21, showing signs of moderate dementia with specific issues with recall (no recall for any of the five rehearsed words)  Main concern is having an aid for patient at home since his dementia is progressing.  His son, Austin Fox [phone] called back to say that his father will not listen to his sons and will not go out of the house for any activities.  He understands that his father has dementia and may not be safe at home alone and may not be able to participate in outside activities as he used to participate.  Pt lacks capacity to participate in discharge planning.

## 2019-08-01 LAB
ANION GAP SERPL CALC-SCNC: 7 MMOL/L — SIGNIFICANT CHANGE UP (ref 5–17)
BUN SERPL-MCNC: 13 MG/DL — SIGNIFICANT CHANGE UP (ref 7–23)
CALCIUM SERPL-MCNC: 10 MG/DL — SIGNIFICANT CHANGE UP (ref 8.4–10.5)
CHLORIDE SERPL-SCNC: 104 MMOL/L — SIGNIFICANT CHANGE UP (ref 96–108)
CO2 SERPL-SCNC: 27 MMOL/L — SIGNIFICANT CHANGE UP (ref 22–31)
CREAT SERPL-MCNC: 1.14 MG/DL — SIGNIFICANT CHANGE UP (ref 0.5–1.3)
GLUCOSE SERPL-MCNC: 97 MG/DL — SIGNIFICANT CHANGE UP (ref 70–99)
HCT VFR BLD CALC: 38.1 % — LOW (ref 39–50)
HGB BLD-MCNC: 12 G/DL — LOW (ref 13–17)
MCHC RBC-ENTMCNC: 24.7 PG — LOW (ref 27–34)
MCHC RBC-ENTMCNC: 31.5 GM/DL — LOW (ref 32–36)
MCV RBC AUTO: 78.6 FL — LOW (ref 80–100)
NRBC # BLD: 0 /100 WBCS — SIGNIFICANT CHANGE UP (ref 0–0)
PLATELET # BLD AUTO: 267 K/UL — SIGNIFICANT CHANGE UP (ref 150–400)
POTASSIUM SERPL-MCNC: 3.8 MMOL/L — SIGNIFICANT CHANGE UP (ref 3.5–5.3)
POTASSIUM SERPL-SCNC: 3.8 MMOL/L — SIGNIFICANT CHANGE UP (ref 3.5–5.3)
RBC # BLD: 4.85 M/UL — SIGNIFICANT CHANGE UP (ref 4.2–5.8)
RBC # FLD: 15.5 % — HIGH (ref 10.3–14.5)
SODIUM SERPL-SCNC: 138 MMOL/L — SIGNIFICANT CHANGE UP (ref 135–145)
WBC # BLD: 6.29 K/UL — SIGNIFICANT CHANGE UP (ref 3.8–10.5)
WBC # FLD AUTO: 6.29 K/UL — SIGNIFICANT CHANGE UP (ref 3.8–10.5)

## 2019-08-01 PROCEDURE — 99231 SBSQ HOSP IP/OBS SF/LOW 25: CPT

## 2019-08-01 RX ADMIN — Medication 81 MILLIGRAM(S): at 12:35

## 2019-08-01 RX ADMIN — AMLODIPINE BESYLATE 10 MILLIGRAM(S): 2.5 TABLET ORAL at 06:48

## 2019-08-01 RX ADMIN — Medication 200 MILLIGRAM(S): at 17:23

## 2019-08-01 RX ADMIN — ESCITALOPRAM OXALATE 20 MILLIGRAM(S): 10 TABLET, FILM COATED ORAL at 12:35

## 2019-08-01 RX ADMIN — Medication 50 MILLIGRAM(S): at 13:48

## 2019-08-01 RX ADMIN — TIOTROPIUM BROMIDE 1 CAPSULE(S): 18 CAPSULE ORAL; RESPIRATORY (INHALATION) at 06:47

## 2019-08-01 RX ADMIN — Medication 200 MILLIGRAM(S): at 06:48

## 2019-08-01 RX ADMIN — Medication 1 MILLIGRAM(S): at 21:30

## 2019-08-01 RX ADMIN — Medication 20 MILLIGRAM(S): at 06:48

## 2019-08-01 RX ADMIN — QUETIAPINE FUMARATE 50 MILLIGRAM(S): 200 TABLET, FILM COATED ORAL at 17:23

## 2019-08-01 RX ADMIN — Medication 50 MILLIGRAM(S): at 21:30

## 2019-08-01 RX ADMIN — ENOXAPARIN SODIUM 40 MILLIGRAM(S): 100 INJECTION SUBCUTANEOUS at 12:35

## 2019-08-01 RX ADMIN — Medication 50 MILLIGRAM(S): at 06:48

## 2019-08-01 RX ADMIN — Medication 20 MILLIGRAM(S): at 17:23

## 2019-08-01 NOTE — PROGRESS NOTE BEHAVIORAL HEALTH - SUMMARY
Patient is a 77 year-old  male, retired, living alone,  since 2016, with a history of depression since his wife passed away three years ago, with no prior / current suicidal ideation, with no history of suicidality, with no prior in-patient hospitalization, no legal history, no history violence/aggression, no substance abuse history admitted to medicine for uncontrolled hypertension. PMH HLD, HTN.      Patient presenting euthymic, reporting periodic depressed mood since the death of his wife. He denies currently feeling depressed. Patient reports being  60 years prior wife passing away and missed her very much.     Patient is not oriented to the day of the week, date of the month, year, and season. patient thought it was August, but yesterday patient thought it was november. Patient is tolerating the Seroquel and says he is sleeping much better. Patient has been smiling and laughing. Patient denies any complaints and states that this hospital takes great care of him.     Austin Darnell [phone]   Plan:  Continue   Lexapro 20mg daily  increase Seroquel 50mg po q6pm for mood and agitation at home (as per son).  Seroquel 50mg po q6hrs prn agitation  Zyprexa 2.5mg IM q 6 hrs prn severe agitation.    Continue Klonopin 0.5mg BID and 1mg PRN   MOCA testing: patient score 21, showing signs of moderate dementia with specific issues with recall (no recall for any of the five rehearsed words)  Main concern is having an aid for patient at home since his dementia is progressing.  His son, Austin Fox [phone] called back to say that his father will not listen to his sons and will not go out of the house for any activities.  He understands that his father has dementia and may not be safe at home alone and may not be able to participate in outside activities as he used to participate.  Pt lacks capacity to participate in discharge planning.

## 2019-08-02 ENCOUNTER — TRANSCRIPTION ENCOUNTER (OUTPATIENT)
Age: 79
End: 2019-08-02

## 2019-08-02 PROCEDURE — 99231 SBSQ HOSP IP/OBS SF/LOW 25: CPT

## 2019-08-02 RX ORDER — HYDRALAZINE HCL 50 MG
1 TABLET ORAL
Qty: 90 | Refills: 0
Start: 2019-08-02 | End: 2019-08-31

## 2019-08-02 RX ORDER — QUETIAPINE FUMARATE 200 MG/1
1 TABLET, FILM COATED ORAL
Qty: 30 | Refills: 0
Start: 2019-08-02 | End: 2019-08-31

## 2019-08-02 RX ADMIN — Medication 20 MILLIGRAM(S): at 05:43

## 2019-08-02 RX ADMIN — Medication 1 MILLIGRAM(S): at 21:26

## 2019-08-02 RX ADMIN — TIOTROPIUM BROMIDE 1 CAPSULE(S): 18 CAPSULE ORAL; RESPIRATORY (INHALATION) at 05:44

## 2019-08-02 RX ADMIN — Medication 50 MILLIGRAM(S): at 14:38

## 2019-08-02 RX ADMIN — QUETIAPINE FUMARATE 50 MILLIGRAM(S): 200 TABLET, FILM COATED ORAL at 17:19

## 2019-08-02 RX ADMIN — Medication 50 MILLIGRAM(S): at 05:42

## 2019-08-02 RX ADMIN — AMLODIPINE BESYLATE 10 MILLIGRAM(S): 2.5 TABLET ORAL at 05:43

## 2019-08-02 RX ADMIN — ENOXAPARIN SODIUM 40 MILLIGRAM(S): 100 INJECTION SUBCUTANEOUS at 11:31

## 2019-08-02 RX ADMIN — Medication 200 MILLIGRAM(S): at 05:42

## 2019-08-02 RX ADMIN — ESCITALOPRAM OXALATE 20 MILLIGRAM(S): 10 TABLET, FILM COATED ORAL at 11:31

## 2019-08-02 RX ADMIN — Medication 20 MILLIGRAM(S): at 17:19

## 2019-08-02 RX ADMIN — Medication 200 MILLIGRAM(S): at 17:19

## 2019-08-02 RX ADMIN — Medication 81 MILLIGRAM(S): at 11:31

## 2019-08-02 RX ADMIN — Medication 50 MILLIGRAM(S): at 21:26

## 2019-08-02 NOTE — PROGRESS NOTE BEHAVIORAL HEALTH - NSBHCONSULTMEDS_PSY_A_CORE FT
as documented above.

## 2019-08-02 NOTE — PROGRESS NOTE BEHAVIORAL HEALTH - RISK ASSESSMENT
Pt is not an acute risk to himself or others.

## 2019-08-02 NOTE — PROGRESS NOTE ADULT - PROBLEM SELECTOR PLAN 1
stable  continue current meds  cardio fu noted
stable  continue current meds  cardio fu noted
copd  loneliness  dementia mild  depression  poor relationship with sons  psych follow up noted  CM notes reviewed  cont Proventil and Spiriva for COPD  seasonal vaccination  education and counseling  nutrition  am labs pending  dc planning  will follow
copd and mild dementia  dc planning remains a challenge  CM notes reviewed  cont COPD rx regimen  increase activity  nutrition  emotional support  out of bed to chair  prognosis guarded
dc planning under way  psych follow up noted  CM notes reviewed  dc home when safe and appropriate - per Psych  copd - on room air - Spiriva and proventil PRN use for sob and or wheezing  seasonal vaccination  increase activity  cardio follow up noted  out of bed
dc planning under way  psych follow up noted  CM notes reviewed  dc home when safe and appropriate - per Psych  copd - on room air - Spiriva and proventil PRN use for sob and or wheezing  seasonal vaccination  increase activity  cardio follow up noted  out of bed.
dc planning under way  psych follow up noted  CM notes reviewed  dc home when safe and appropriate - per Psych  copd - on room air - Spiriva and proventil PRN use for sob and or wheezing  seasonal vaccination  increase activity  cardio follow up noted  out of bed.
ex smoker  copd  depression  mild dementia  doing well on Spiriva and Proventil PRN  psych eval noted  supportive measures  dc home  tolerating room air
improved with increase in medication doses  feels better  cardio noted  dc tele
stable  continue current meds
stable  continue current meds
stable  continue current meds  dc planning
stable  continue current meds  dc planning
still elevated  meds adjusted again this am  monitor  if stable dc in am tomorrow
stable  continue current meds  dc planning

## 2019-08-02 NOTE — PROGRESS NOTE BEHAVIORAL HEALTH - SUMMARY
Patient is a 77 year-old  male, retired, living alone,  since 2016, with a history of depression since his wife passed away three years ago, with no prior / current suicidal ideation, with no history of suicidality, with no prior in-patient hospitalization, no legal history, no history violence/aggression, no substance abuse history admitted to medicine for uncontrolled hypertension. PMH HLD, HTN.      Patient presenting euthymic, reporting periodic depressed mood since the death of his wife. He denies currently feeling depressed. Patient reports being  60 years prior wife passing away and missed her very much.     Patient is not oriented to the day of the week, date of the month, year, and season. patient thought it was August, but yesterday patient thought it was november. Patient is tolerating the Seroquel and says he is sleeping much better. Patient has been smiling and laughing. Patient denies any complaints and states that this hospital takes great care of him.     Austin Darnell [phone]   Plan:  Continue   Lexapro 20mg daily  increase Seroquel 50mg po q6pm for mood and agitation at home (as per son).  Seroquel 50mg po q6hrs prn agitation  Zyprexa 2.5mg IM q 6 hrs prn severe agitation.    Continue Klonopin 0.5mg BID and 1mg PRN   MOCA testing: patient score 21, showing signs of moderate dementia with specific issues with recall (no recall for any of the five rehearsed words)  Main concern is having an aid for patient at home since his dementia is progressing.  His son, Austin Fox [phone] called back to say that his father will not listen to his sons and will not go out of the house for any activities.  He understands that his father has dementia and may not be safe at home alone and may not be able to participate in outside activities as he used to participate.  Pt lacks capacity to participate in discharge planning.

## 2019-08-02 NOTE — PROGRESS NOTE BEHAVIORAL HEALTH - ORIENTATION OTHER
Patient thought it was August, not July. better than yesterday where he though it was November

## 2019-08-02 NOTE — PROGRESS NOTE BEHAVIORAL HEALTH - NSBHATTESTSEENBY_PSY_A_CORE
attending Psychiatrist without NP/Trainee
Attending Psychiatrist supervising NP/Trainee, meeting pt...

## 2019-08-02 NOTE — PROGRESS NOTE ADULT - REASON FOR ADMISSION
sob and anxiety with elevated bp

## 2019-08-02 NOTE — DISCHARGE NOTE PROVIDER - NSDCCPCAREPLAN_GEN_ALL_CORE_FT
PRINCIPAL DISCHARGE DIAGNOSIS  Diagnosis: Hypertensive urgency  Assessment and Plan of Treatment: continue meds as prescribed  fu with your pcp

## 2019-08-02 NOTE — PROGRESS NOTE BEHAVIORAL HEALTH - THOUGHT PROCESS
Linear/Normal reasoning
Normal reasoning/Linear
Linear/Normal reasoning
Normal reasoning/Linear
Linear/Normal reasoning

## 2019-08-02 NOTE — PROGRESS NOTE ADULT - PROBLEM SELECTOR PLAN 2
continue home meds  psychiatry eval noted and meds adjusted  sw eval for safe dc planning
continue current meds  psychiatry eval noted and meds adjusted  sw eval for safe dc planning done and some plan is being established
continue home meds  psychiatry eval noted and meds adjusted  sw eval for safe dc planning done and some plan is being established
continue current meds  psychiatry eval noted and meds adjusted  safe dc planning
continue home meds  psychiatry eval noted and meds adjusted  sw eval for safe dc planning
continue home meds  psychiatry eval noted and meds adjusted  sw eval for safe dc planning
due to anxiety  cxr clear  pulm eval noted
improved  continue inhalers per pulm
stable and improved  continue current meds

## 2019-08-02 NOTE — PROGRESS NOTE BEHAVIORAL HEALTH - NSBHPTASSESSDT_PSY_A_CORE
01-Aug-2019 17:00
02-Aug-2019 16:10
25-Jul-2019 11:24
29-Jul-2019 16:42
31-Jul-2019 17:40
26-Jul-2019 15:43
30-Jul-2019 16:59

## 2019-08-02 NOTE — PROGRESS NOTE BEHAVIORAL HEALTH - SECONDARY DX1
Dementia without behavioral disturbance, unspecified dementia type

## 2019-08-02 NOTE — PROGRESS NOTE BEHAVIORAL HEALTH - CASE SUMMARY
Patient is a 77 year-old  male, retired, living alone,  since 2016, with a history of depression since his wife passed away three years ago, with no prior / current suicidal ideation, with no history of suicidality, with no prior in-patient hospitalization, no legal history, no history violence/aggression, no substance abuse history admitted to medicine for uncontrolled hypertension. PMH HLD, HTN.  Patient presenting euthymic, reporting periodic depressed mood since the death of his wife. He denies currently feeling depressed. Patient reports being  60 years prior wife passing away and missed her very much. Patient is not oriented to the day of the week, date of the month, year, and season. patient thought it was August, but yesterday patient thought it was november. Patient is tolerating the Seroquel and says he is sleeping much better. Patient has been smiling and laughing. Patient denies any complaints and states that this hospital takes great care of him. Austin Darnell [phone] Plan: Continue  Lexapro 20mg daily increase Seroquel 50mg po q6pm for mood and agitation at home (as per son). Seroquel 50mg po q6hrs prn agitation Zyprexa 2.5mg IM q 6 hrs prn severe agitation.  Continue Klonopin 0.5mg BID and 1mg PRN  MOCA testing: patient score 21, showing signs of moderate dementia with specific issues with recall (no recall for any of the five rehearsed words)  Main concern is having an aid for patient at home since his dementia is progressing.  His son, Austin Fox [phone] called back to say that his father will not listen to his sons and will not go out of the house for any activities.  He understands that his father has dementia and may not be safe at home alone and may not be able to participate in outside activities as he used to participate.  Pt lacks capacity to participate in discharge planning.

## 2019-08-02 NOTE — PROGRESS NOTE BEHAVIORAL HEALTH - NSBHCONSULTFOLLOWDETAILS_PSY_A_CORE FT
Pt does not need a psychiatric admission but he will need a safe discharge and does not have capacity to participate in discharge planning.  Pt's son does have power of  and may hire an aid as he did in the past.

## 2019-08-02 NOTE — PROGRESS NOTE ADULT - PROBLEM SELECTOR PROBLEM 1
Hypertensive urgency
COPD (chronic obstructive pulmonary disease)
Dementia without behavioral disturbance, unspecified dementia type
Hypertensive urgency

## 2019-08-02 NOTE — PROGRESS NOTE ADULT - ATTENDING COMMENTS
sw for dc planning  medically improved
richard for dc planning - likely on saturday  medically stable  await son to arrive from [place] for dc home likely on saturday  discussed with sw and psych
sw for dc planning  medically stable  await son to arrive from [place] for dc home likely on saturday  discussed with sw and psych
sw for dc planning  medically improved
sw for dc planning  medically stable  await son to arrive from [place]  discussed with sw and psych
sw for dc planning - likely on saturday
sw for dc planning - likely on saturday  medically stable  await son to arrive from [place] for dc home likely on saturday
sw for dc planning  medically improved
sw for dc planning likely in next 24 hrs

## 2019-08-02 NOTE — PROGRESS NOTE BEHAVIORAL HEALTH - NSBHCHARTREVIEWVS_PSY_A_CORE FT
Vital Signs Last 24 Hrs  T(C): 36.7 (30 Jul 2019 16:57), Max: 36.8 (30 Jul 2019 00:16)  T(F): 98 (30 Jul 2019 16:57), Max: 98.3 (30 Jul 2019 00:16)  HR: 68 (30 Jul 2019 16:57) (65 - 75)  BP: 149/75 (30 Jul 2019 16:57) (115/78 - 149/75)  BP(mean): --  RR: 16 (30 Jul 2019 16:57) (16 - 18)  SpO2: 97% (30 Jul 2019 16:57) (91% - 97%)
Vital Signs Last 24 Hrs  T(C): 36.9 (29 Jul 2019 14:30), Max: 37 (29 Jul 2019 00:37)  T(F): 98.5 (29 Jul 2019 14:30), Max: 98.6 (29 Jul 2019 00:37)  HR: 67 (29 Jul 2019 14:30) (66 - 77)  BP: 132/73 (29 Jul 2019 14:30) (107/61 - 149/76)  BP(mean): --  RR: 16 (29 Jul 2019 14:30) (16 - 18)  SpO2: 94% (29 Jul 2019 14:30) (92% - 96%)
Vital Signs Last 24 Hrs  T(C): 36.9 (25 Jul 2019 09:15), Max: 37.1 (24 Jul 2019 21:33)  T(F): 98.4 (25 Jul 2019 09:15), Max: 98.7 (24 Jul 2019 21:33)  HR: 69 (25 Jul 2019 09:15) (67 - 74)  BP: 168/87 (25 Jul 2019 09:15) (136/75 - 172/84)  BP(mean): --  RR: 18 (25 Jul 2019 09:15) (18 - 20)  SpO2: 95% (25 Jul 2019 09:15) (94% - 97%)
Vital Signs Last 24 Hrs  T(C): 36.7 (01 Aug 2019 13:42), Max: 36.7 (31 Jul 2019 17:20)  T(F): 98.1 (01 Aug 2019 13:42), Max: 98.1 (31 Jul 2019 17:20)  HR: 66 (01 Aug 2019 13:42) (64 - 70)  BP: 149/80 (01 Aug 2019 13:42) (118/64 - 155/79)  BP(mean): --  RR: 16 (01 Aug 2019 13:42) (16 - 18)  SpO2: 97% (01 Aug 2019 13:42) (94% - 97%)
Vital Signs Last 24 Hrs  T(C): 36.9 (25 Jul 2019 09:15), Max: 37.1 (24 Jul 2019 21:33)  T(F): 98.4 (25 Jul 2019 09:15), Max: 98.7 (24 Jul 2019 21:33)  HR: 69 (25 Jul 2019 09:15) (67 - 74)  BP: 168/87 (25 Jul 2019 09:15) (136/75 - 172/84)  BP(mean): --  RR: 18 (25 Jul 2019 09:15) (18 - 20)  SpO2: 95% (25 Jul 2019 09:15) (94% - 97%)
Vital Signs Last 24 Hrs  T(C): 36.7 (01 Aug 2019 13:42), Max: 36.7 (31 Jul 2019 17:20)  T(F): 98.1 (01 Aug 2019 13:42), Max: 98.1 (31 Jul 2019 17:20)  HR: 66 (01 Aug 2019 13:42) (64 - 70)  BP: 149/80 (01 Aug 2019 13:42) (118/64 - 155/79)  BP(mean): --  RR: 16 (01 Aug 2019 13:42) (16 - 18)  SpO2: 97% (01 Aug 2019 13:42) (94% - 97%)
Vital Signs Last 24 Hrs  T(C): 36.7 (31 Jul 2019 17:20), Max: 37.1 (31 Jul 2019 01:06)  T(F): 98.1 (31 Jul 2019 17:20), Max: 98.8 (31 Jul 2019 01:06)  HR: 70 (31 Jul 2019 17:20) (60 - 70)  BP: 155/79 (31 Jul 2019 17:20) (124/69 - 155/79)  BP(mean): --  RR: 17 (31 Jul 2019 17:20) (16 - 18)  SpO2: 95% (31 Jul 2019 17:20) (95% - 100%)

## 2019-08-02 NOTE — DISCHARGE NOTE PROVIDER - HOSPITAL COURSE
Pt admitted for sob and accelerted htn and depression with dementia. pt seen by cardio and pulm and psych, bp meds adjusted with improvement in bp. pt evaluated by psych and his antidepressant meds adjusted, he is going home with his son in stable condition with safe dc planning as he lives alone. he will live with one of his sons. to fu with his pcp for outpt bp control.        >30 minutes spent on discharge Pt admitted for sob and accelerted htn and depression with dementia. pt seen by cardio and pulm and psych, bp meds adjusted with improvement in bp. pt evaluated by psych and his antidepressant meds adjusted, he is going home with his son in stable condition with safe dc planning as he lives alone. he will live with one of his sons. to fu with his pcp for outpt bp control. pt cleared for dc by cardio and pulm.        >30 minutes spent on discharge

## 2019-08-02 NOTE — PROGRESS NOTE BEHAVIORAL HEALTH - NSBHCHARTREVIEWLAB_PSY_A_CORE FT
CBC Full  -  ( 24 Jul 2019 12:06 )  WBC Count : 5.80 K/uL  RBC Count : 4.51 M/uL  Hemoglobin : 11.3 g/dL  Hematocrit : 35.2 %  Platelet Count - Automated : 231 K/uL  Mean Cell Volume : 78.0 fl  Mean Cell Hemoglobin : 25.1 pg  Mean Cell Hemoglobin Concentration : 32.1 gm/dL  Auto Neutrophil # : x  Auto Lymphocyte # : x  Auto Monocyte # : x  Auto Eosinophil # : x  Auto Basophil # : x  Auto Neutrophil % : x  Auto Lymphocyte % : x  Auto Monocyte % : x  Auto Eosinophil % : x  Auto Basophil % : x    07-25    140  |  107  |  11  ----------------------------<  136<H>  3.6   |  26  |  1.05    Ca    9.5      25 Jul 2019 10:16    TPro  6.9  /  Alb  3.5  /  TBili  0.6  /  DBili  x   /  AST  26  /  ALT  27  /  AlkPhos  116  07-24    Activated Partial Thromboplastin Time: 27.9: The recommended therapeutic heparin range (full dose) is 58-99 seconds.  Recommended therapeutic Argatroban range is 1.5 to 3.0 times the baseline  APTT value, not to exceed 100 seconds. Recommended therapeutic Refludan  range is 1.5 to 2.5 times thebaseline APTT.  Effective October 30, 2018 the reference range has changed. sec (07.24.19 @ 08:43)    Blood Gas Profile - Venous (07.24.19 @ 10:04)    pH, Venous: 7.41    pCO2, Venous: 29 mmHg    pO2, Venous: 53 mmHg    HCO3, Venous: 20 mmol/L    Base Excess, Venous: -5.9 mmol/L    Oxygen Saturation, Venous: 86 %    FIO2, Venous: 21    Blood Gas Source Venous: Venous
CBC Full  -  ( 24 Jul 2019 12:06 )  WBC Count : 5.80 K/uL  RBC Count : 4.51 M/uL  Hemoglobin : 11.3 g/dL  Hematocrit : 35.2 %  Platelet Count - Automated : 231 K/uL  Mean Cell Volume : 78.0 fl  Mean Cell Hemoglobin : 25.1 pg  Mean Cell Hemoglobin Concentration : 32.1 gm/dL  Auto Neutrophil # : x  Auto Lymphocyte # : x  Auto Monocyte # : x  Auto Eosinophil # : x  Auto Basophil # : x  Auto Neutrophil % : x  Auto Lymphocyte % : x  Auto Monocyte % : x  Auto Eosinophil % : x  Auto Basophil % : x    07-25    140  |  107  |  11  ----------------------------<  136<H>  3.6   |  26  |  1.05    Ca    9.5      25 Jul 2019 10:16    TPro  6.9  /  Alb  3.5  /  TBili  0.6  /  DBili  x   /  AST  26  /  ALT  27  /  AlkPhos  116  07-24    Activated Partial Thromboplastin Time: 27.9: The recommended therapeutic heparin range (full dose) is 58-99 seconds.  Recommended therapeutic Argatroban range is 1.5 to 3.0 times the baseline  APTT value, not to exceed 100 seconds. Recommended therapeutic Refludan  range is 1.5 to 2.5 times thebaseline APTT.  Effective October 30, 2018 the reference range has changed. sec (07.24.19 @ 08:43)    Blood Gas Profile - Venous (07.24.19 @ 10:04)    pH, Venous: 7.41    pCO2, Venous: 29 mmHg    pO2, Venous: 53 mmHg    HCO3, Venous: 20 mmol/L    Base Excess, Venous: -5.9 mmol/L    Oxygen Saturation, Venous: 86 %    FIO2, Venous: 21    Blood Gas Source Venous: Venous
12.0   6.29  )-----------( 267      ( 01 Aug 2019 08:05 )             38.1   08-01    138  |  104  |  13  ----------------------------<  97  3.8   |  27  |  1.14    Ca    10.0      01 Aug 2019 08:05
CBC Full  -  ( 24 Jul 2019 12:06 )  WBC Count : 5.80 K/uL  RBC Count : 4.51 M/uL  Hemoglobin : 11.3 g/dL  Hematocrit : 35.2 %  Platelet Count - Automated : 231 K/uL  Mean Cell Volume : 78.0 fl  Mean Cell Hemoglobin : 25.1 pg  Mean Cell Hemoglobin Concentration : 32.1 gm/dL  Auto Neutrophil # : x  Auto Lymphocyte # : x  Auto Monocyte # : x  Auto Eosinophil # : x  Auto Basophil # : x  Auto Neutrophil % : x  Auto Lymphocyte % : x  Auto Monocyte % : x  Auto Eosinophil % : x  Auto Basophil % : x    07-25    140  |  107  |  11  ----------------------------<  136<H>  3.6   |  26  |  1.05    Ca    9.5      25 Jul 2019 10:16    TPro  6.9  /  Alb  3.5  /  TBili  0.6  /  DBili  x   /  AST  26  /  ALT  27  /  AlkPhos  116  07-24    Activated Partial Thromboplastin Time: 27.9: The recommended therapeutic heparin range (full dose) is 58-99 seconds.  Recommended therapeutic Argatroban range is 1.5 to 3.0 times the baseline  APTT value, not to exceed 100 seconds. Recommended therapeutic Refludan  range is 1.5 to 2.5 times thebaseline APTT.  Effective October 30, 2018 the reference range has changed. sec (07.24.19 @ 08:43)    Blood Gas Profile - Venous (07.24.19 @ 10:04)    pH, Venous: 7.41    pCO2, Venous: 29 mmHg    pO2, Venous: 53 mmHg    HCO3, Venous: 20 mmol/L    Base Excess, Venous: -5.9 mmol/L    Oxygen Saturation, Venous: 86 %    FIO2, Venous: 21    Blood Gas Source Venous: Venous
CBC Full  -  ( 24 Jul 2019 12:06 )  WBC Count : 5.80 K/uL  RBC Count : 4.51 M/uL  Hemoglobin : 11.3 g/dL  Hematocrit : 35.2 %  Platelet Count - Automated : 231 K/uL  Mean Cell Volume : 78.0 fl  Mean Cell Hemoglobin : 25.1 pg  Mean Cell Hemoglobin Concentration : 32.1 gm/dL  Auto Neutrophil # : x  Auto Lymphocyte # : x  Auto Monocyte # : x  Auto Eosinophil # : x  Auto Basophil # : x  Auto Neutrophil % : x  Auto Lymphocyte % : x  Auto Monocyte % : x  Auto Eosinophil % : x  Auto Basophil % : x    07-25    140  |  107  |  11  ----------------------------<  136<H>  3.6   |  26  |  1.05    Ca    9.5      25 Jul 2019 10:16    TPro  6.9  /  Alb  3.5  /  TBili  0.6  /  DBili  x   /  AST  26  /  ALT  27  /  AlkPhos  116  07-24    Activated Partial Thromboplastin Time: 27.9: The recommended therapeutic heparin range (full dose) is 58-99 seconds.  Recommended therapeutic Argatroban range is 1.5 to 3.0 times the baseline  APTT value, not to exceed 100 seconds. Recommended therapeutic Refludan  range is 1.5 to 2.5 times thebaseline APTT.  Effective October 30, 2018 the reference range has changed. sec (07.24.19 @ 08:43)    Blood Gas Profile - Venous (07.24.19 @ 10:04)    pH, Venous: 7.41    pCO2, Venous: 29 mmHg    pO2, Venous: 53 mmHg    HCO3, Venous: 20 mmol/L    Base Excess, Venous: -5.9 mmol/L    Oxygen Saturation, Venous: 86 %    FIO2, Venous: 21    Blood Gas Source Venous: Venous
12.0   6.29  )-----------( 267      ( 01 Aug 2019 08:05 )             38.1   08-01    138  |  104  |  13  ----------------------------<  97  3.8   |  27  |  1.14    Ca    10.0      01 Aug 2019 08:05
CBC Full  -  ( 24 Jul 2019 12:06 )  WBC Count : 5.80 K/uL  RBC Count : 4.51 M/uL  Hemoglobin : 11.3 g/dL  Hematocrit : 35.2 %  Platelet Count - Automated : 231 K/uL  Mean Cell Volume : 78.0 fl  Mean Cell Hemoglobin : 25.1 pg  Mean Cell Hemoglobin Concentration : 32.1 gm/dL  Auto Neutrophil # : x  Auto Lymphocyte # : x  Auto Monocyte # : x  Auto Eosinophil # : x  Auto Basophil # : x  Auto Neutrophil % : x  Auto Lymphocyte % : x  Auto Monocyte % : x  Auto Eosinophil % : x  Auto Basophil % : x    07-25    140  |  107  |  11  ----------------------------<  136<H>  3.6   |  26  |  1.05    Ca    9.5      25 Jul 2019 10:16    TPro  6.9  /  Alb  3.5  /  TBili  0.6  /  DBili  x   /  AST  26  /  ALT  27  /  AlkPhos  116  07-24    Activated Partial Thromboplastin Time: 27.9: The recommended therapeutic heparin range (full dose) is 58-99 seconds.  Recommended therapeutic Argatroban range is 1.5 to 3.0 times the baseline  APTT value, not to exceed 100 seconds. Recommended therapeutic Refludan  range is 1.5 to 2.5 times thebaseline APTT.  Effective October 30, 2018 the reference range has changed. sec (07.24.19 @ 08:43)    Blood Gas Profile - Venous (07.24.19 @ 10:04)    pH, Venous: 7.41    pCO2, Venous: 29 mmHg    pO2, Venous: 53 mmHg    HCO3, Venous: 20 mmol/L    Base Excess, Venous: -5.9 mmol/L    Oxygen Saturation, Venous: 86 %    FIO2, Venous: 21    Blood Gas Source Venous: Venous

## 2019-08-02 NOTE — PROGRESS NOTE BEHAVIORAL HEALTH - DETAILS
Short of breathe

## 2019-08-02 NOTE — PROGRESS NOTE BEHAVIORAL HEALTH - NSBHCHARTREVIEWINVESTIGATE_PSY_A_CORE FT
Ventricular Rate 79 BPM    Atrial Rate 79 BPM    P-R Interval 198 ms    QRS Duration 104 ms    Q-T Interval 384 ms    QTC Calculation(Bezet) 440 ms    P Axis 3 degrees    R Axis 19 degrees    T Axis 29 degrees    Diagnosis Line Normal sinus rhythm  Normal ECG  When compared with ECG of 26-APR-2019 18:13,  Nonspecific T wave abnormality, improved in Anterolateral leads  Confirmed by MD JOSE M, MALCOLM ([id number]) on 6/7/2019 9:41:25 AM

## 2019-08-02 NOTE — PROGRESS NOTE ADULT - PROBLEM SELECTOR PLAN 3
continue home meds  psychiatry eval - discussed with psych - meds adjusted
continue home meds  psychiatry eval - noted, pt will require safe dc planning
continue home meds  psychiatry eval noted and meds adjusted  sw eval
continue home meds  psychiatry eval noted and meds adjusted  sw eval for safe dc planning
continue home meds  stable  for dc home tomorrow with son

## 2019-08-03 ENCOUNTER — TRANSCRIPTION ENCOUNTER (OUTPATIENT)
Age: 79
End: 2019-08-03

## 2019-08-03 VITALS
RESPIRATION RATE: 20 BRPM | OXYGEN SATURATION: 95 % | DIASTOLIC BLOOD PRESSURE: 66 MMHG | HEART RATE: 66 BPM | TEMPERATURE: 98 F | SYSTOLIC BLOOD PRESSURE: 112 MMHG

## 2019-08-03 RX ADMIN — Medication 200 MILLIGRAM(S): at 06:47

## 2019-08-03 RX ADMIN — AMLODIPINE BESYLATE 10 MILLIGRAM(S): 2.5 TABLET ORAL at 06:47

## 2019-08-03 RX ADMIN — Medication 50 MILLIGRAM(S): at 06:48

## 2019-08-03 RX ADMIN — TIOTROPIUM BROMIDE 1 CAPSULE(S): 18 CAPSULE ORAL; RESPIRATORY (INHALATION) at 06:48

## 2019-08-03 RX ADMIN — Medication 20 MILLIGRAM(S): at 06:48

## 2019-08-03 NOTE — PROGRESS NOTE ADULT - ASSESSMENT
The patient is a 78 year old male with a history of HTN, HL, depression, anxiety who presents with shortness of breath and hypertension.    Plan:  - Continue enalapril 20 mg bid  - Continue labetalol 200 mg bid  - Continue hydralazine 50 mg tid  - SW and psych follow-up
The patient is a 78 year old male with a history of HTN, HL, depression, anxiety who presents with shortness of breath and hypertension.    7/27/19   Patient lying flat, sleeping comfortably  Easily arousable  No cardiac complaints offered  No significant arrhythmias documented      Plan:  - Continue enalapril 20 mg bid  - Continue labetalol 200 mg bid  - Continue hydralazine 50 mg tid  - Continue Amlodipine-10mg OD  - Per social work, psychiatry  - Patient stable from a Cardiac stand-point and to follow-up as out-patient
The patient is a 78 year old male with a history of HTN, HL, depression, anxiety who presents with shortness of breath and hypertension.    7/27/19   Patient sitting in chair  No cardiac complaints offered  Ambulating  No significant arrhythmias documented  Patient wants to go home      Plan:  - Continue enalapril 20 mg bid  - Continue labetalol 200 mg bid  - Continue hydralazine 50 mg tid  - Continue Amlodipine-10mg OD  - Per social work, psychiatry
The patient is a 78 year old male with a history of HTN, HL, depression, anxiety who presents with shortness of breath and hypertension.    Plan:  - Continue enalapril 20 mg bid  - Continue labetalol 200 mg bid  - Continue hydralazine 50 mg tid  - SW and psych follow-up
The patient is a 78 year old male with a history of HTN, HL, depression, anxiety who presents with shortness of breath and hypertension.    Plan:  - Continue enalapril 20 mg bid  - Continue labetalol 200 mg bid  - Continue hydralazine 50 mg tid  - SW and psych follow-up  - Discharge planning
The patient is a 78 year old male with a history of HTN, HL, depression, anxiety who presents with shortness of breath and hypertension.    Plan:  - Continue enalapril 20 mg bid  - Continue labetalol 200 mg bid  - Continue hydralazine 50 mg tid  - SW and psych follow-up  - Discharge planning
The patient is a 78 year old male with a history of HTN, HL, depression, anxiety who presents with shortness of breath and hypertension.    Plan:  - ECG with no evidence of ischemia or infarction  - Acute MI ruled out with one set of cardiac enzymes  - CXR clear  - CTA chest done last month for similar symptoms and negative for PE  - Echo from 4/2019 with normal LV systolic function, no significant valve issues  - BNP normal  - Continue enalapril 20 mg bid  - Continue labetalol 200 mg bid  - Increase hydralazine to 50 mg tid  - SW. Likely needs help at home.  - Discharge planning

## 2019-08-03 NOTE — PROGRESS NOTE ADULT - PROVIDER SPECIALTY LIST ADULT
Cardiology
Internal Medicine
Pulmonology
Cardiology
Internal Medicine
Internal Medicine

## 2019-08-03 NOTE — PROGRESS NOTE ADULT - SUBJECTIVE AND OBJECTIVE BOX
Chief Complaint: Shortness of breath    Interval Events: No events overnight. No complaints.    Review of Systems:  General: No fevers, chills, weight loss or gain  Skin: No rashes, color changes  Cardiovascular: No chest pain, orthopnea  Respiratory: No shortness of breath, cough  Gastrointestinal: No nausea, abdominal pain  Genitourinary: No incontinence, pain with urination  Musculoskeletal: No pain, swelling, decreased range of motion  Neurological: No headache, weakness  Psychiatric: No depression, anxiety  Endocrine: No weight loss or gain, increased thirst  All other systems are comprehensively negative.    Physical Exam:  Vital Signs Last 24 Hrs  T(C): 36.5 (01 Aug 2019 06:07), Max: 36.8 (31 Jul 2019 14:10)  T(F): 97.7 (01 Aug 2019 06:07), Max: 98.2 (31 Jul 2019 14:10)  HR: 67 (01 Aug 2019 06:07) (61 - 70)  BP: 150/82 (01 Aug 2019 06:07) (124/77 - 155/79)  BP(mean): --  RR: 18 (01 Aug 2019 06:07) (17 - 18)  SpO2: 95% (01 Aug 2019 06:07) (94% - 100%)  General: NAD  HEENT: MMM  Neck: No JVD, no carotid bruit  Lungs: CTAB  CV: RRR, nl S1/S2, no M/R/G  Abdomen: S/NT/ND, +BS  Extremities: No LE edema, no cyanosis  Neuro: AAOx3, non-focal  Skin: No rash    Labs:                          12.0   6.29  )-----------( 267      ( 01 Aug 2019 08:05 )             38.1       Telemetry: Sinus rhythm
Patient is a 78y old  Male who presents with a chief complaint of sob and anxiety with elevated bp (30 Jul 2019 06:52)      INTERVAL HPI/OVERNIGHT EVENTS: pt stable, status unchanged, discussed with sw, son to take father home once hes back from in town.  bp stable    MEDICATIONS  (STANDING):  amLODIPine   Tablet 10 milliGRAM(s) Oral daily  aspirin enteric coated 81 milliGRAM(s) Oral daily  clonazePAM  Tablet 0.5 milliGRAM(s) Oral two times a day  doxazosin 1 milliGRAM(s) Oral at bedtime  enalapril 20 milliGRAM(s) Oral two times a day  enoxaparin Injectable 40 milliGRAM(s) SubCutaneous daily  escitalopram 20 milliGRAM(s) Oral daily  hydrALAZINE 50 milliGRAM(s) Oral every 8 hours  labetalol 200 milliGRAM(s) Oral two times a day  QUEtiapine 25 milliGRAM(s) Oral <User Schedule>  tiotropium 18 MICROgram(s) Capsule 1 Capsule(s) Inhalation daily    MEDICATIONS  (PRN):  ALBUTerol    90 MICROgram(s) HFA Inhaler 2 Puff(s) Inhalation every 6 hours PRN Shortness of Breath and/or Wheezing      Allergies    Novocain (Unknown)    Intolerances        REVIEW OF SYSTEMS:  CONSTITUTIONAL: No fever, weight loss, or fatigue  EYES: No eye pain, visual disturbances  ENMT:  No difficulty hearing, tinnitus, vertigo; No sinus or throat pain  NECK: No pain or stiffness  RESPIRATORY: No cough, wheezing, chills or hemoptysis; No shortness of breath  CARDIOVASCULAR: No chest pain, palpitations, dizziness  GASTROINTESTINAL: No abdominal or epigastric pain. No nausea, vomiting, or hematemesis; No diarrhea or constipation. No melena or hematochezia.  GENITOURINARY: No dysuria, frequency, hematuria, or incontinence  NEUROLOGICAL: No headaches, memory loss, loss of strength, numbness, or tremors  SKIN: No itching, burning  LYMPH NODES: No enlarged glands  MUSCULOSKELETAL: No joint pain or swelling; No muscle, back, or extremity pain  PSYCHIATRIC: No depression, mood swings  HEME/LYMPH: No easy bruising, or bleeding gums  ALLERGY AND IMMUNOLOGIC: No hives    Vital Signs Last 24 Hrs  T(C): 36.8 (30 Jul 2019 09:15), Max: 36.9 (29 Jul 2019 14:30)  T(F): 98.3 (30 Jul 2019 09:15), Max: 98.5 (29 Jul 2019 14:30)  HR: 67 (30 Jul 2019 09:15) (66 - 75)  BP: 129/70 (30 Jul 2019 09:15) (115/78 - 141/76)  BP(mean): --  RR: 18 (30 Jul 2019 09:15) (16 - 18)  SpO2: 96% (30 Jul 2019 09:15) (91% - 96%)    PHYSICAL EXAM:  GENERAL: NAD, well-groomed, well-developed  HEAD:  Atraumatic, Normocephalic  EYES: EOMI, PERRLA, conjunctiva and sclera clear  ENMT: No tonsillar erythema, exudates, or enlargement   NECK: Supple, No JVD  NERVOUS SYSTEM:  Alert & awake and oriented  CHEST/LUNG: Clear to auscultation bilaterally; No rales, rhonchi, wheezing  HEART: Regular rate and rhythm  ABDOMEN: Soft, Nontender, Nondistended; Bowel sounds present  EXTREMITIES:  2+ Peripheral Pulses   LYMPH: No lymphadenopathy noted  SKIN: No rashes     LABS:      Ca    9.8        29 Jul 2019 07:29          CAPILLARY BLOOD GLUCOSE                RADIOLOGY & ADDITIONAL TESTS:      Consultant(s) Notes Reviewed:  [x ] YES  [ ] NO    Care Discussed with Consultants/Other Providers [x ] YES  [ ] NO    Advanced care planning discussed with patient and family, advanced care planning forms reviewed, discussed, and completed.  20 minutes spent.
Chief Complaint: Shortness of breath    Interval Events: Admitted to hospital. Sleeping.    Review of Systems:  General: No fevers, chills, weight loss or gain  Skin: No rashes, color changes  Cardiovascular: No chest pain, orthopnea  Respiratory: No shortness of breath, cough  Gastrointestinal: No nausea, abdominal pain  Genitourinary: No incontinence, pain with urination  Musculoskeletal: No pain, swelling, decreased range of motion  Neurological: No headache, weakness  Psychiatric: No depression, anxiety  Endocrine: No weight loss or gain, increased thirst  All other systems are comprehensively negative.    Physical Exam:  Vitals:        Vital Signs Last 24 Hrs  T(C): 36.7 (25 Jul 2019 05:37), Max: 37.1 (24 Jul 2019 21:33)  T(F): 98.1 (25 Jul 2019 05:37), Max: 98.7 (24 Jul 2019 21:33)  HR: 73 (25 Jul 2019 05:37) (67 - 85)  BP: 172/84 (25 Jul 2019 05:37) (134/74 - 199/88)  BP(mean): --  RR: 18 (25 Jul 2019 05:37) (18 - 27)  SpO2: 95% (25 Jul 2019 05:37) (94% - 100%)  General: NAD  HEENT: MMM  Neck: No JVD, no carotid bruit  Lungs: CTAB  CV: RRR, nl S1/S2, no M/R/G  Abdomen: S/NT/ND, +BS  Extremities: No LE edema, no cyanosis  Neuro: AAOx3, non-focal  Skin: No rash    Labs:                        11.3   5.80  )-----------( 231      ( 24 Jul 2019 12:06 )             35.2     07-24    139  |  106  |  10  ----------------------------<  145<H>  3.0<L>   |  24  |  1.13    Ca    9.7      24 Jul 2019 12:06    TPro  6.9  /  Alb  3.5  /  TBili  0.6  /  DBili  x   /  AST  26  /  ALT  27  /  AlkPhos  116  07-24    CARDIAC MARKERS ( 24 Jul 2019 08:43 )  .019 ng/mL / x     / x     / x     / x          PT/INR - ( 24 Jul 2019 08:43 )   PT: 10.7 sec;   INR: 0.98 ratio         PTT - ( 24 Jul 2019 08:43 )  PTT:27.9 sec    Telemetry: Sinus rhythm
Chief Complaint: Shortness of breath    Interval Events: No events overnight.    Review of Systems:  General: No fevers, chills, weight loss or gain  Skin: No rashes, color changes  Cardiovascular: No chest pain, orthopnea  Respiratory: No shortness of breath, cough  Gastrointestinal: No nausea, abdominal pain  Genitourinary: No incontinence, pain with urination  Musculoskeletal: No pain, swelling, decreased range of motion  Neurological: No headache, weakness  Psychiatric: No depression, anxiety  Endocrine: No weight loss or gain, increased thirst  All other systems are comprehensively negative.    Physical Exam:  Vital Signs Last 24 Hrs  T(C): 36.5 (02 Aug 2019 05:20), Max: 36.8 (02 Aug 2019 00:13)  T(F): 97.7 (02 Aug 2019 05:20), Max: 98.3 (02 Aug 2019 00:13)  HR: 67 (02 Aug 2019 05:20) (64 - 71)  BP: 165/82 (02 Aug 2019 05:20) (117/70 - 165/82)  BP(mean): --  RR: 18 (02 Aug 2019 05:20) (16 - 18)  SpO2: 97% (02 Aug 2019 05:20) (95% - 97%)  General: NAD  HEENT: MMM  Neck: No JVD, no carotid bruit  Lungs: CTAB  CV: RRR, nl S1/S2, no M/R/G  Abdomen: S/NT/ND, +BS  Extremities: No LE edema, no cyanosis  Neuro: AAOx3, non-focal  Skin: No rash    Labs:    08-01    138  |  104  |  13  ----------------------------<  97  3.8   |  27  |  1.14    Ca    10.0      01 Aug 2019 08:05                          12.0   6.29  )-----------( 267      ( 01 Aug 2019 08:05 )             38.1
Chief Complaint: Shortness of breath    Interval Events: No events overnight.    Review of Systems:  General: No fevers, chills, weight loss or gain  Skin: No rashes, color changes  Cardiovascular: No chest pain, orthopnea  Respiratory: No shortness of breath, cough  Gastrointestinal: No nausea, abdominal pain  Genitourinary: No incontinence, pain with urination  Musculoskeletal: No pain, swelling, decreased range of motion  Neurological: No headache, weakness  Psychiatric: No depression, anxiety  Endocrine: No weight loss or gain, increased thirst  All other systems are comprehensively negative.    Physical Exam:  Vital Signs Last 24 Hrs  T(C): 36.6 (26 Jul 2019 05:52), Max: 36.9 (25 Jul 2019 09:15)  T(F): 97.9 (26 Jul 2019 05:52), Max: 98.4 (25 Jul 2019 09:15)  HR: 66 (26 Jul 2019 05:52) (66 - 72)  BP: 156/75 (26 Jul 2019 05:52) (145/75 - 168/87)  BP(mean): --  RR: 18 (26 Jul 2019 05:52) (17 - 18)  SpO2: 98% (26 Jul 2019 05:52) (95% - 98%)  General: NAD  HEENT: MMM  Neck: No JVD, no carotid bruit  Lungs: CTAB  CV: RRR, nl S1/S2, no M/R/G  Abdomen: S/NT/ND, +BS  Extremities: No LE edema, no cyanosis  Neuro: AAOx3, non-focal  Skin: No rash    Labs:             07-25    140  |  107  |  11  ----------------------------<  136<H>  3.6   |  26  |  1.05    Ca    9.5      25 Jul 2019 10:16    TPro  6.9  /  Alb  3.5  /  TBili  0.6  /  DBili  x   /  AST  26  /  ALT  27  /  AlkPhos  116  07-24                        11.3   5.80  )-----------( 231      ( 24 Jul 2019 12:06 )             35.2       Telemetry: Sinus rhythm
Chief Complaint: Shortness of breath    Interval Events: No events overnight.    Review of Systems:  General: No fevers, chills, weight loss or gain  Skin: No rashes, color changes  Cardiovascular: No chest pain, orthopnea  Respiratory: No shortness of breath, cough  Gastrointestinal: No nausea, abdominal pain  Genitourinary: No incontinence, pain with urination  Musculoskeletal: No pain, swelling, decreased range of motion  Neurological: No headache, weakness  Psychiatric: No depression, anxiety  Endocrine: No weight loss or gain, increased thirst  All other systems are comprehensively negative.    Physical Exam:  Vital Signs Last 24 Hrs  T(C): 36.6 (29 Jul 2019 05:37), Max: 37 (29 Jul 2019 00:37)  T(F): 97.8 (29 Jul 2019 05:37), Max: 98.6 (29 Jul 2019 00:37)  HR: 69 (29 Jul 2019 05:37) (69 - 77)  BP: 149/76 (29 Jul 2019 05:37) (139/80 - 149/76)  BP(mean): --  RR: 18 (29 Jul 2019 05:37) (18 - 18)  SpO2: 95% (29 Jul 2019 05:37) (92% - 96%)  General: NAD  HEENT: MMM  Neck: No JVD, no carotid bruit  Lungs: CTAB  CV: RRR, nl S1/S2, no M/R/G  Abdomen: S/NT/ND, +BS  Extremities: No LE edema, no cyanosis  Neuro: AAOx3, non-focal  Skin: No rash    Labs:             07-29    139  |  105  |  12  ----------------------------<  97  4.0   |  27  |  1.10    Ca    9.8      29 Jul 2019 07:29                          11.8   6.66  )-----------( 266      ( 29 Jul 2019 07:29 )             37.0       Telemetry: Sinus rhythm
Chief Complaint: Shortness of breath    Interval Events: No events overnight.    Review of Systems:  General: No fevers, chills, weight loss or gain  Skin: No rashes, color changes  Cardiovascular: No chest pain, orthopnea  Respiratory: No shortness of breath, cough  Gastrointestinal: No nausea, abdominal pain  Genitourinary: No incontinence, pain with urination  Musculoskeletal: No pain, swelling, decreased range of motion  Neurological: No headache, weakness  Psychiatric: No depression, anxiety  Endocrine: No weight loss or gain, increased thirst  All other systems are comprehensively negative.    Physical Exam:  Vital Signs Last 24 Hrs  T(C): 36.8 (03 Aug 2019 06:05), Max: 37 (03 Aug 2019 00:07)  T(F): 98.3 (03 Aug 2019 06:05), Max: 98.6 (03 Aug 2019 00:07)  HR: 73 (03 Aug 2019 06:05) (67 - 73)  BP: 147/73 (03 Aug 2019 06:05) (115/74 - 158/79)  BP(mean): --  RR: 18 (03 Aug 2019 06:05) (17 - 20)  SpO2: 95% (03 Aug 2019 06:05) (93% - 96%)  General: NAD  HEENT: MMM  Neck: No JVD, no carotid bruit  Lungs: CTAB  CV: RRR, nl S1/S2, no M/R/G  Abdomen: S/NT/ND, +BS  Extremities: No LE edema, no cyanosis  Neuro: AAOx3, non-focal  Skin: No rash    Labs:
Chief Complaint: Shortness of breath    Interval Events: No events overnight. No complaints.    Review of Systems:  General: No fevers, chills, weight loss or gain  Skin: No rashes, color changes  Cardiovascular: No chest pain, orthopnea  Respiratory: No shortness of breath, cough  Gastrointestinal: No nausea, abdominal pain  Genitourinary: No incontinence, pain with urination  Musculoskeletal: No pain, swelling, decreased range of motion  Neurological: No headache, weakness  Psychiatric: No depression, anxiety  Endocrine: No weight loss or gain, increased thirst  All other systems are comprehensively negative.    Physical Exam:  Vital Signs Last 24 Hrs  T(C): 36.6 (30 Jul 2019 05:43), Max: 36.9 (29 Jul 2019 09:25)  T(F): 97.8 (30 Jul 2019 05:43), Max: 98.5 (29 Jul 2019 14:30)  HR: 75 (30 Jul 2019 05:43) (66 - 75)  BP: 115/78 (30 Jul 2019 05:43) (107/61 - 141/76)  BP(mean): --  RR: 18 (30 Jul 2019 05:43) (16 - 18)  SpO2: 91% (30 Jul 2019 05:43) (91% - 96%)  General: NAD  HEENT: MMM  Neck: No JVD, no carotid bruit  Lungs: CTAB  CV: RRR, nl S1/S2, no M/R/G  Abdomen: S/NT/ND, +BS  Extremities: No LE edema, no cyanosis  Neuro: AAOx3, non-focal  Skin: No rash    Labs:    07-29    139  |  105  |  12  ----------------------------<  97  4.0   |  27  |  1.10    Ca    9.8      29 Jul 2019 07:29                          11.8   6.66  )-----------( 266      ( 29 Jul 2019 07:29 )             37.0       Telemetry: Sinus rhythm
Chief Complaint: Shortness of breath    Interval Events: No events overnight. No complaints.    Review of Systems:  General: No fevers, chills, weight loss or gain  Skin: No rashes, color changes  Cardiovascular: No chest pain, orthopnea  Respiratory: No shortness of breath, cough  Gastrointestinal: No nausea, abdominal pain  Genitourinary: No incontinence, pain with urination  Musculoskeletal: No pain, swelling, decreased range of motion  Neurological: No headache, weakness  Psychiatric: No depression, anxiety  Endocrine: No weight loss or gain, increased thirst  All other systems are comprehensively negative.    Physical Exam:  Vital Signs Last 24 Hrs  T(C): 36.9 (31 Jul 2019 06:08), Max: 37.1 (31 Jul 2019 01:06)  T(F): 98.4 (31 Jul 2019 06:08), Max: 98.8 (31 Jul 2019 01:06)  HR: 67 (31 Jul 2019 06:08) (60 - 68)  BP: 142/64 (31 Jul 2019 06:08) (124/69 - 149/75)  BP(mean): --  RR: 18 (31 Jul 2019 06:08) (16 - 18)  SpO2: 95% (31 Jul 2019 06:08) (95% - 97%)  General: NAD  HEENT: MMM  Neck: No JVD, no carotid bruit  Lungs: CTAB  CV: RRR, nl S1/S2, no M/R/G  Abdomen: S/NT/ND, +BS  Extremities: No LE edema, no cyanosis  Neuro: AAOx3, non-focal  Skin: No rash    Labs:        Telemetry: Sinus rhythm
Date/Time Patient Seen:  		  Referring MD:   Data Reviewed	       Patient is a 78y old  Male who presents with a chief complaint of sob and anxiety with elevated bp (24 Jul 2019 11:04)      Subjective/HPI     PAST MEDICAL & SURGICAL HISTORY:  Anxiety  HLD (hyperlipidemia)  HTN (hypertension)  History of tonsillectomy  No significant past surgical history        Medication list         MEDICATIONS  (STANDING):  amLODIPine   Tablet 10 milliGRAM(s) Oral daily  aspirin enteric coated 81 milliGRAM(s) Oral daily  clonazePAM  Tablet 0.5 milliGRAM(s) Oral two times a day  doxazosin 1 milliGRAM(s) Oral at bedtime  enalapril 20 milliGRAM(s) Oral two times a day  enoxaparin Injectable 40 milliGRAM(s) SubCutaneous daily  escitalopram 20 milliGRAM(s) Oral daily  hydrALAZINE 25 milliGRAM(s) Oral every 8 hours  labetalol 200 milliGRAM(s) Oral two times a day  QUEtiapine 25 milliGRAM(s) Oral <User Schedule>  tiotropium 18 MICROgram(s) Capsule 1 Capsule(s) Inhalation daily    MEDICATIONS  (PRN):  ALBUTerol    90 MICROgram(s) HFA Inhaler 2 Puff(s) Inhalation every 6 hours PRN Shortness of Breath and/or Wheezing         Vitals log        ICU Vital Signs Last 24 Hrs  T(C): 36.7 (25 Jul 2019 05:37), Max: 37.1 (24 Jul 2019 21:33)  T(F): 98.1 (25 Jul 2019 05:37), Max: 98.7 (24 Jul 2019 21:33)  HR: 73 (25 Jul 2019 05:37) (67 - 85)  BP: 172/84 (25 Jul 2019 05:37) (134/74 - 203/100)  BP(mean): --  ABP: --  ABP(mean): --  RR: 18 (25 Jul 2019 05:37) (15 - 27)  SpO2: 95% (25 Jul 2019 05:37) (94% - 100%)           Input and Output:  I&O's Detail    24 Jul 2019 07:01  -  25 Jul 2019 06:15  --------------------------------------------------------  IN:  Total IN: 0 mL    OUT:    Voided: 600 mL  Total OUT: 600 mL    Total NET: -600 mL          Lab Data                        11.3   5.80  )-----------( 231      ( 24 Jul 2019 12:06 )             35.2     07-24    139  |  106  |  10  ----------------------------<  145<H>  3.0<L>   |  24  |  1.13    Ca    9.7      24 Jul 2019 12:06    TPro  6.9  /  Alb  3.5  /  TBili  0.6  /  DBili  x   /  AST  26  /  ALT  27  /  AlkPhos  116  07-24      CARDIAC MARKERS ( 24 Jul 2019 08:43 )  .019 ng/mL / x     / x     / x     / x            Review of Systems	      Objective     Physical Examination    heart s1s2  lung dec BS      Pertinent Lab findings & Imaging      Abdi:  NO   Adequate UO     I&O's Detail    24 Jul 2019 07:01  -  25 Jul 2019 06:15  --------------------------------------------------------  IN:  Total IN: 0 mL    OUT:    Voided: 600 mL  Total OUT: 600 mL    Total NET: -600 mL               Discussed with:     Cultures:	        Radiology
Date/Time Patient Seen:  		  Referring MD:   Data Reviewed	       Patient is a 78y old  Male who presents with a chief complaint of sob and anxiety with elevated bp (25 Jul 2019 09:24)      Subjective/HPI     PAST MEDICAL & SURGICAL HISTORY:  Anxiety  HLD (hyperlipidemia)  HTN (hypertension)  History of tonsillectomy  No significant past surgical history        Medication list         MEDICATIONS  (STANDING):  amLODIPine   Tablet 10 milliGRAM(s) Oral daily  aspirin enteric coated 81 milliGRAM(s) Oral daily  clonazePAM  Tablet 0.5 milliGRAM(s) Oral two times a day  doxazosin 1 milliGRAM(s) Oral at bedtime  enalapril 20 milliGRAM(s) Oral two times a day  enoxaparin Injectable 40 milliGRAM(s) SubCutaneous daily  escitalopram 20 milliGRAM(s) Oral daily  hydrALAZINE 50 milliGRAM(s) Oral every 8 hours  labetalol 200 milliGRAM(s) Oral two times a day  QUEtiapine 25 milliGRAM(s) Oral <User Schedule>  tiotropium 18 MICROgram(s) Capsule 1 Capsule(s) Inhalation daily    MEDICATIONS  (PRN):  ALBUTerol    90 MICROgram(s) HFA Inhaler 2 Puff(s) Inhalation every 6 hours PRN Shortness of Breath and/or Wheezing         Vitals log        ICU Vital Signs Last 24 Hrs  T(C): 36.6 (26 Jul 2019 05:52), Max: 36.9 (25 Jul 2019 09:15)  T(F): 97.9 (26 Jul 2019 05:52), Max: 98.4 (25 Jul 2019 09:15)  HR: 66 (26 Jul 2019 05:52) (66 - 72)  BP: 156/75 (26 Jul 2019 05:52) (145/75 - 168/87)  BP(mean): --  ABP: --  ABP(mean): --  RR: 18 (26 Jul 2019 05:52) (17 - 18)  SpO2: 98% (26 Jul 2019 05:52) (95% - 98%)           Input and Output:  I&O's Detail    24 Jul 2019 07:01  -  25 Jul 2019 07:00  --------------------------------------------------------  IN:  Total IN: 0 mL    OUT:    Voided: 600 mL  Total OUT: 600 mL    Total NET: -600 mL      25 Jul 2019 07:01  -  26 Jul 2019 06:09  --------------------------------------------------------  IN:    Oral Fluid: 1200 mL  Total IN: 1200 mL    OUT:  Total OUT: 0 mL    Total NET: 1200 mL          Lab Data                        11.3   5.80  )-----------( 231      ( 24 Jul 2019 12:06 )             35.2     07-25    140  |  107  |  11  ----------------------------<  136<H>  3.6   |  26  |  1.05    Ca    9.5      25 Jul 2019 10:16    TPro  6.9  /  Alb  3.5  /  TBili  0.6  /  DBili  x   /  AST  26  /  ALT  27  /  AlkPhos  116  07-24      CARDIAC MARKERS ( 24 Jul 2019 08:43 )  .019 ng/mL / x     / x     / x     / x            Review of Systems	      Objective     Physical Examination    heart s1s2  lung dec BS  abd soft      Pertinent Lab findings & Imaging      Abdi:  NO   Adequate UO     I&O's Detail    24 Jul 2019 07:01  -  25 Jul 2019 07:00  --------------------------------------------------------  IN:  Total IN: 0 mL    OUT:    Voided: 600 mL  Total OUT: 600 mL    Total NET: -600 mL      25 Jul 2019 07:01  -  26 Jul 2019 06:09  --------------------------------------------------------  IN:    Oral Fluid: 1200 mL  Total IN: 1200 mL    OUT:  Total OUT: 0 mL    Total NET: 1200 mL               Discussed with:     Cultures:	        Radiology
Date/Time Patient Seen:  		  Referring MD:   Data Reviewed	       Patient is a 78y old  Male who presents with a chief complaint of sob and anxiety with elevated bp (26 Jul 2019 11:13)      Subjective/HPI     PAST MEDICAL & SURGICAL HISTORY:  Anxiety  HLD (hyperlipidemia)  HTN (hypertension)  History of tonsillectomy  No significant past surgical history        Medication list         MEDICATIONS  (STANDING):  amLODIPine   Tablet 10 milliGRAM(s) Oral daily  aspirin enteric coated 81 milliGRAM(s) Oral daily  clonazePAM  Tablet 0.5 milliGRAM(s) Oral two times a day  doxazosin 1 milliGRAM(s) Oral at bedtime  enalapril 20 milliGRAM(s) Oral two times a day  enoxaparin Injectable 40 milliGRAM(s) SubCutaneous daily  escitalopram 20 milliGRAM(s) Oral daily  hydrALAZINE 50 milliGRAM(s) Oral every 8 hours  labetalol 200 milliGRAM(s) Oral two times a day  QUEtiapine 25 milliGRAM(s) Oral <User Schedule>  tiotropium 18 MICROgram(s) Capsule 1 Capsule(s) Inhalation daily    MEDICATIONS  (PRN):  ALBUTerol    90 MICROgram(s) HFA Inhaler 2 Puff(s) Inhalation every 6 hours PRN Shortness of Breath and/or Wheezing         Vitals log        ICU Vital Signs Last 24 Hrs  T(C): 36.9 (27 Jul 2019 00:39), Max: 37.1 (26 Jul 2019 08:45)  T(F): 98.4 (27 Jul 2019 00:39), Max: 98.7 (26 Jul 2019 08:45)  HR: 74 (27 Jul 2019 00:39) (68 - 77)  BP: 142/77 (27 Jul 2019 00:39) (122/72 - 148/76)  BP(mean): --  ABP: --  ABP(mean): --  RR: 16 (27 Jul 2019 00:39) (16 - 18)  SpO2: 94% (27 Jul 2019 00:39) (94% - 96%)           Input and Output:  I&O's Detail    25 Jul 2019 07:01  -  26 Jul 2019 07:00  --------------------------------------------------------  IN:    Oral Fluid: 1200 mL  Total IN: 1200 mL    OUT:  Total OUT: 0 mL    Total NET: 1200 mL          Lab Data    07-26    139  |  106  |  9   ----------------------------<  97  3.8   |  27  |  0.94    Ca    9.6      26 Jul 2019 08:13              Review of Systems	      Objective     Physical Examination      heart s1s2  lung dec BS  abd soft    Pertinent Lab findings & Imaging      Abdi:  NO   Adequate UO     I&O's Detail    25 Jul 2019 07:01  -  26 Jul 2019 07:00  --------------------------------------------------------  IN:    Oral Fluid: 1200 mL  Total IN: 1200 mL    OUT:  Total OUT: 0 mL    Total NET: 1200 mL               Discussed with:     Cultures:	        Radiology
Date/Time Patient Seen:  		  Referring MD:   Data Reviewed	       Patient is a 78y old  Male who presents with a chief complaint of sob and anxiety with elevated bp (27 Jul 2019 11:43)      Subjective/HPI     PAST MEDICAL & SURGICAL HISTORY:  Anxiety  HLD (hyperlipidemia)  HTN (hypertension)  History of tonsillectomy  No significant past surgical history        Medication list         MEDICATIONS  (STANDING):  amLODIPine   Tablet 10 milliGRAM(s) Oral daily  aspirin enteric coated 81 milliGRAM(s) Oral daily  clonazePAM  Tablet 0.5 milliGRAM(s) Oral two times a day  doxazosin 1 milliGRAM(s) Oral at bedtime  enalapril 20 milliGRAM(s) Oral two times a day  enoxaparin Injectable 40 milliGRAM(s) SubCutaneous daily  escitalopram 20 milliGRAM(s) Oral daily  hydrALAZINE 50 milliGRAM(s) Oral every 8 hours  labetalol 200 milliGRAM(s) Oral two times a day  QUEtiapine 25 milliGRAM(s) Oral <User Schedule>  tiotropium 18 MICROgram(s) Capsule 1 Capsule(s) Inhalation daily    MEDICATIONS  (PRN):  ALBUTerol    90 MICROgram(s) HFA Inhaler 2 Puff(s) Inhalation every 6 hours PRN Shortness of Breath and/or Wheezing         Vitals log        ICU Vital Signs Last 24 Hrs  T(C): 36.6 (28 Jul 2019 05:35), Max: 37.4 (27 Jul 2019 17:05)  T(F): 97.9 (28 Jul 2019 05:35), Max: 99.4 (27 Jul 2019 17:05)  HR: 70 (28 Jul 2019 05:35) (67 - 107)  BP: 168/81 (28 Jul 2019 05:35) (115/58 - 168/81)  BP(mean): --  ABP: --  ABP(mean): --  RR: 18 (28 Jul 2019 05:35) (16 - 18)  SpO2: 97% (28 Jul 2019 05:35) (94% - 97%)           Input and Output:  I&O's Detail    27 Jul 2019 07:01  -  28 Jul 2019 05:55  --------------------------------------------------------  IN:    Oral Fluid: 790 mL  Total IN: 790 mL    OUT:  Total OUT: 0 mL    Total NET: 790 mL          Lab Data    07-26    139  |  106  |  9   ----------------------------<  97  3.8   |  27  |  0.94    Ca    9.6      26 Jul 2019 08:13              Review of Systems	      Objective     Physical Examination    heart s1s2  lung dec BS  abd soft      Pertinent Lab findings & Imaging      Abdi:  NO   Adequate UO     I&O's Detail    27 Jul 2019 07:01  -  28 Jul 2019 05:55  --------------------------------------------------------  IN:    Oral Fluid: 790 mL  Total IN: 790 mL    OUT:  Total OUT: 0 mL    Total NET: 790 mL               Discussed with:     Cultures:	        Radiology
Date/Time Patient Seen:  		  Referring MD:   Data Reviewed	       Patient is a 78y old  Male who presents with a chief complaint of sob and anxiety with elevated bp (28 Jul 2019 21:25)      Subjective/HPI     PAST MEDICAL & SURGICAL HISTORY:  Anxiety  HLD (hyperlipidemia)  HTN (hypertension)  History of tonsillectomy  No significant past surgical history        Medication list         MEDICATIONS  (STANDING):  amLODIPine   Tablet 10 milliGRAM(s) Oral daily  aspirin enteric coated 81 milliGRAM(s) Oral daily  clonazePAM  Tablet 0.5 milliGRAM(s) Oral two times a day  doxazosin 1 milliGRAM(s) Oral at bedtime  enalapril 20 milliGRAM(s) Oral two times a day  enoxaparin Injectable 40 milliGRAM(s) SubCutaneous daily  escitalopram 20 milliGRAM(s) Oral daily  hydrALAZINE 50 milliGRAM(s) Oral every 8 hours  labetalol 200 milliGRAM(s) Oral two times a day  QUEtiapine 25 milliGRAM(s) Oral <User Schedule>  tiotropium 18 MICROgram(s) Capsule 1 Capsule(s) Inhalation daily    MEDICATIONS  (PRN):  ALBUTerol    90 MICROgram(s) HFA Inhaler 2 Puff(s) Inhalation every 6 hours PRN Shortness of Breath and/or Wheezing         Vitals log        ICU Vital Signs Last 24 Hrs  T(C): 36.6 (29 Jul 2019 05:37), Max: 37 (29 Jul 2019 00:37)  T(F): 97.8 (29 Jul 2019 05:37), Max: 98.6 (29 Jul 2019 00:37)  HR: 69 (29 Jul 2019 05:37) (69 - 77)  BP: 149/76 (29 Jul 2019 05:37) (133/73 - 149/76)  BP(mean): --  ABP: --  ABP(mean): --  RR: 18 (29 Jul 2019 05:37) (16 - 18)  SpO2: 95% (29 Jul 2019 05:37) (92% - 96%)           Input and Output:  I&O's Detail    27 Jul 2019 07:01  -  28 Jul 2019 07:00  --------------------------------------------------------  IN:    Oral Fluid: 790 mL  Total IN: 790 mL    OUT:  Total OUT: 0 mL    Total NET: 790 mL          Lab Data                  Review of Systems	      Objective     Physical Examination    heart s1s2  lung dec BS    Pertinent Lab findings & Imaging      Abdi:  NO   Adequate UO     I&O's Detail    27 Jul 2019 07:01  -  28 Jul 2019 07:00  --------------------------------------------------------  IN:    Oral Fluid: 790 mL  Total IN: 790 mL    OUT:  Total OUT: 0 mL    Total NET: 790 mL               Discussed with:     Cultures:	        Radiology
Date/Time Patient Seen:  		  Referring MD:   Data Reviewed	       Patient is a 78y old  Male who presents with a chief complaint of sob and anxiety with elevated bp (29 Jul 2019 14:24)      Subjective/HPI     PAST MEDICAL & SURGICAL HISTORY:  Anxiety  HLD (hyperlipidemia)  HTN (hypertension)  History of tonsillectomy  No significant past surgical history        Medication list         MEDICATIONS  (STANDING):  amLODIPine   Tablet 10 milliGRAM(s) Oral daily  aspirin enteric coated 81 milliGRAM(s) Oral daily  clonazePAM  Tablet 0.5 milliGRAM(s) Oral two times a day  doxazosin 1 milliGRAM(s) Oral at bedtime  enalapril 20 milliGRAM(s) Oral two times a day  enoxaparin Injectable 40 milliGRAM(s) SubCutaneous daily  escitalopram 20 milliGRAM(s) Oral daily  hydrALAZINE 50 milliGRAM(s) Oral every 8 hours  labetalol 200 milliGRAM(s) Oral two times a day  QUEtiapine 25 milliGRAM(s) Oral <User Schedule>  tiotropium 18 MICROgram(s) Capsule 1 Capsule(s) Inhalation daily    MEDICATIONS  (PRN):  ALBUTerol    90 MICROgram(s) HFA Inhaler 2 Puff(s) Inhalation every 6 hours PRN Shortness of Breath and/or Wheezing         Vitals log        ICU Vital Signs Last 24 Hrs  T(C): 36.6 (30 Jul 2019 05:43), Max: 36.9 (29 Jul 2019 09:25)  T(F): 97.8 (30 Jul 2019 05:43), Max: 98.5 (29 Jul 2019 14:30)  HR: 75 (30 Jul 2019 05:43) (66 - 75)  BP: 115/78 (30 Jul 2019 05:43) (107/61 - 141/76)  BP(mean): --  ABP: --  ABP(mean): --  RR: 18 (30 Jul 2019 05:43) (16 - 18)  SpO2: 91% (30 Jul 2019 05:43) (91% - 96%)           Input and Output:  I&O's Detail    29 Jul 2019 07:01  -  30 Jul 2019 06:53  --------------------------------------------------------  IN:    Oral Fluid: 820 mL  Total IN: 820 mL    OUT:    Voided: 400 mL  Total OUT: 400 mL    Total NET: 420 mL          Lab Data                        11.8   6.66  )-----------( 266      ( 29 Jul 2019 07:29 )             37.0     07-29    139  |  105  |  12  ----------------------------<  97  4.0   |  27  |  1.10    Ca    9.8      29 Jul 2019 07:29              Review of Systems	      Objective     Physical Examination        Pertinent Lab findings & Imaging      Abdi:  NO   Adequate UO     I&O's Detail    29 Jul 2019 07:01  -  30 Jul 2019 06:53  --------------------------------------------------------  IN:    Oral Fluid: 820 mL  Total IN: 820 mL    OUT:    Voided: 400 mL  Total OUT: 400 mL    Total NET: 420 mL               Discussed with:     Cultures:	        Radiology
Patient is a 78y Male with a known history of :  COPD (chronic obstructive pulmonary disease) (J44.9)  Dementia without behavioral disturbance, unspecified dementia type (F03.90)  Moderate episode of recurrent major depressive disorder (F33.1)  Depression (F32.9)  Anxiety (F41.9)  Hypertensive urgency (I16.0)  Dyspnea (R06.00)    HPI:  77 yo male with hx of anxiety, frequent ER visits for SOB, anxiety, brought by ambulance from home for eval of SOB since last night. Pt admits to increased anxiety today. Cannot remember if he took his anxiety med or not. Denies fever, cough, CP, sputum, N, V, or other symptom. Pt in er noted to have elevated bp and is anxious. needs further bp control and psych eval for possible placement.   PCP Jackie Dickson (24 Jul 2019 11:04)      REVIEW OF SYSTEMS:    CONSTITUTIONAL: No fever, weight loss, or fatigue  EYES: No eye pain, visual disturbances, or discharge  ENMT:  No difficulty hearing, tinnitus, vertigo; No sinus or throat pain  NECK: No pain or stiffness  BREASTS: No pain, masses, or nipple discharge  RESPIRATORY: No cough, wheezing, chills or hemoptysis; No shortness of breath  CARDIOVASCULAR: No chest pain, palpitations, dizziness, or leg swelling  GASTROINTESTINAL: No abdominal or epigastric pain. No nausea, vomiting, or hematemesis; No diarrhea or constipation. No melena or hematochezia.  GENITOURINARY: No dysuria, frequency, hematuria, or incontinence  NEUROLOGICAL: No headaches, memory loss, loss of strength, numbness, or tremors  SKIN: No itching, burning, rashes, or lesions   LYMPH NODES: No enlarged glands  ENDOCRINE: No heat or cold intolerance; No hair loss  MUSCULOSKELETAL: No joint pain or swelling; No muscle, back, or extremity pain  PSYCHIATRIC: No depression, anxiety, mood swings, or difficulty sleeping  HEME/LYMPH: No easy bruising, or bleeding gums  ALLERGY AND IMMUNOLOGIC: No hives or eczema    MEDICATIONS  (STANDING):  amLODIPine   Tablet 10 milliGRAM(s) Oral daily  aspirin enteric coated 81 milliGRAM(s) Oral daily  clonazePAM  Tablet 0.5 milliGRAM(s) Oral two times a day  doxazosin 1 milliGRAM(s) Oral at bedtime  enalapril 20 milliGRAM(s) Oral two times a day  enoxaparin Injectable 40 milliGRAM(s) SubCutaneous daily  escitalopram 20 milliGRAM(s) Oral daily  hydrALAZINE 50 milliGRAM(s) Oral every 8 hours  labetalol 200 milliGRAM(s) Oral two times a day  QUEtiapine 25 milliGRAM(s) Oral <User Schedule>  tiotropium 18 MICROgram(s) Capsule 1 Capsule(s) Inhalation daily    MEDICATIONS  (PRN):  ALBUTerol    90 MICROgram(s) HFA Inhaler 2 Puff(s) Inhalation every 6 hours PRN Shortness of Breath and/or Wheezing      ALLERGIES: Novocain (Unknown)      FAMILY HISTORY:  No pertinent family history in first degree relatives      Social history:  Alochol:   Smoking:   Drug Use:   Marital Status:     PHYSICAL EXAMINATION:  -----------------------------  T(C): 36.8 (07-27-19 @ 09:35), Max: 36.9 (07-26-19 @ 21:21)  HR: 67 (07-27-19 @ 09:35) (67 - 77)  BP: 115/58 (07-27-19 @ 09:35) (115/58 - 163/84)  RR: 18 (07-27-19 @ 09:35) (16 - 20)  SpO2: 95% (07-27-19 @ 09:35) (94% - 96%)  Wt(kg): --        Constitutional: well developed, normal appearance, well groomed, well nourished, no deformities and no acute distress.   Eyes: the conjunctiva exhibited no abnormalities and the eyelids demonstrated no xanthelasmas.   HEENT: normal oral mucosa, no oral pallor and no oral cyanosis.   Neck: normal jugular venous A waves present, normal jugular venous V waves present and no jugular venous hagan A waves.   Pulmonary: no respiratory distress, normal respiratory rhythm and effort, no accessory muscle use and lungs were clear to auscultation bilaterally.   Cardiovascular: heart rate and rhythm were normal, normal S1 and S2 and no murmur, gallop, rub, heave or thrill are present.   Musculoskeletal: the gait could not be assessed.   Extremities: no clubbing of the fingernails, no localized cyanosis, no petechial hemorrhages and no ischemic changes.   Skin: normal skin color and pigmentation, no rash, no venous stasis, no skin lesions, no skin ulcer and no xanthoma was observed.   Psychiatric: oriented to person, place, and time, the affect was normal, the mood was normal and not feeling anxious.     LABS:   --------  07-26    139  |  106  |  9   ----------------------------<  97  3.8   |  27  |  0.94    Ca    9.6      26 Jul 2019 08:13                     Radiology:
Patient is a 78y Male with a known history of :  COPD (chronic obstructive pulmonary disease) (J44.9)  Dementia without behavioral disturbance, unspecified dementia type (F03.90)  Moderate episode of recurrent major depressive disorder (F33.1)  Depression (F32.9)  Anxiety (F41.9)  Hypertensive urgency (I16.0)  Dyspnea (R06.00)    HPI:  79 yo male with hx of anxiety, frequent ER visits for SOB, anxiety, brought by ambulance from home for eval of SOB since last night. Pt admits to increased anxiety today. Cannot remember if he took his anxiety med or not. Denies fever, cough, CP, sputum, N, V, or other symptom. Pt in er noted to have elevated bp and is anxious. needs further bp control and psych eval for possible placement.   PCP Jackie Dickson (24 Jul 2019 11:04)      REVIEW OF SYSTEMS:    CONSTITUTIONAL: No fever, weight loss, or fatigue  EYES: No eye pain, visual disturbances, or discharge  ENMT:  No difficulty hearing, tinnitus, vertigo; No sinus or throat pain  NECK: No pain or stiffness  BREASTS: No pain, masses, or nipple discharge  RESPIRATORY: No cough, wheezing, chills or hemoptysis; No shortness of breath  CARDIOVASCULAR: No chest pain, palpitations, dizziness, or leg swelling  GASTROINTESTINAL: No abdominal or epigastric pain. No nausea, vomiting, or hematemesis; No diarrhea or constipation. No melena or hematochezia.  GENITOURINARY: No dysuria, frequency, hematuria, or incontinence  NEUROLOGICAL: No headaches, memory loss, loss of strength, numbness, or tremors  SKIN: No itching, burning, rashes, or lesions   LYMPH NODES: No enlarged glands  ENDOCRINE: No heat or cold intolerance; No hair loss  MUSCULOSKELETAL: No joint pain or swelling; No muscle, back, or extremity pain  PSYCHIATRIC: No depression, anxiety, mood swings, or difficulty sleeping  HEME/LYMPH: No easy bruising, or bleeding gums  ALLERGY AND IMMUNOLOGIC: No hives or eczema    MEDICATIONS  (STANDING):  amLODIPine   Tablet 10 milliGRAM(s) Oral daily  aspirin enteric coated 81 milliGRAM(s) Oral daily  clonazePAM  Tablet 0.5 milliGRAM(s) Oral two times a day  doxazosin 1 milliGRAM(s) Oral at bedtime  enalapril 20 milliGRAM(s) Oral two times a day  enoxaparin Injectable 40 milliGRAM(s) SubCutaneous daily  escitalopram 20 milliGRAM(s) Oral daily  hydrALAZINE 50 milliGRAM(s) Oral every 8 hours  labetalol 200 milliGRAM(s) Oral two times a day  QUEtiapine 25 milliGRAM(s) Oral <User Schedule>  tiotropium 18 MICROgram(s) Capsule 1 Capsule(s) Inhalation daily    MEDICATIONS  (PRN):  ALBUTerol    90 MICROgram(s) HFA Inhaler 2 Puff(s) Inhalation every 6 hours PRN Shortness of Breath and/or Wheezing      ALLERGIES: Novocain (Unknown)      FAMILY HISTORY:  No pertinent family history in first degree relatives      Social history:  Alochol:   Smoking:   Drug Use:   Marital Status:     PHYSICAL EXAMINATION:  -----------------------------  T(C): 36.7 (07-28-19 @ 09:00), Max: 37.4 (07-27-19 @ 17:05)  HR: 71 (07-28-19 @ 09:00) (67 - 107)  BP: 133/73 (07-28-19 @ 09:00) (133/73 - 168/81)  RR: 16 (07-28-19 @ 09:00) (16 - 18)  SpO2: 95% (07-28-19 @ 09:00) (94% - 97%)  Wt(kg): --    07-27 @ 07:01  -  07-28 @ 07:00  --------------------------------------------------------  IN:    Oral Fluid: 790 mL  Total IN: 790 mL    OUT:  Total OUT: 0 mL    Total NET: 790 mL            Constitutional: well developed, normal appearance, well groomed, well nourished, no deformities and no acute distress.   Eyes: the conjunctiva exhibited no abnormalities and the eyelids demonstrated no xanthelasmas.   HEENT: normal oral mucosa, no oral pallor and no oral cyanosis.   Neck: normal jugular venous A waves present, normal jugular venous V waves present and no jugular venous hagan A waves.   Pulmonary: no respiratory distress, normal respiratory rhythm and effort, no accessory muscle use and lungs were clear to auscultation bilaterally.   Cardiovascular: heart rate and rhythm were normal, normal S1 and S2 and no murmur, gallop, rub, heave or thrill are present.    Musculoskeletal: the gait could not be assessed.   Extremities: no clubbing of the fingernails, no localized cyanosis, no petechial hemorrhages and no ischemic changes.   Skin: normal skin color and pigmentation, no rash, no venous stasis, no skin lesions, no skin ulcer and no xanthoma was observed.   Psychiatric: oriented to person, place, and time, the affect was normal, the mood was normal and not feeling anxious.     LABS:   --------                       Radiology:
Patient is a 78y old  Male who presents with a chief complaint of sob and anxiety with elevated bp (01 Aug 2019 11:31)      INTERVAL HPI/OVERNIGHT EVENTS: stable feels well, plan dc tomorrow, bp stable    MEDICATIONS  (STANDING):  amLODIPine   Tablet 10 milliGRAM(s) Oral daily  aspirin enteric coated 81 milliGRAM(s) Oral daily  doxazosin 1 milliGRAM(s) Oral at bedtime  enalapril 20 milliGRAM(s) Oral two times a day  enoxaparin Injectable 40 milliGRAM(s) SubCutaneous daily  escitalopram 20 milliGRAM(s) Oral daily  hydrALAZINE 50 milliGRAM(s) Oral every 8 hours  labetalol 200 milliGRAM(s) Oral two times a day  QUEtiapine 50 milliGRAM(s) Oral daily  tiotropium 18 MICROgram(s) Capsule 1 Capsule(s) Inhalation daily    MEDICATIONS  (PRN):  ALBUTerol    90 MICROgram(s) HFA Inhaler 2 Puff(s) Inhalation every 6 hours PRN Shortness of Breath and/or Wheezing      Allergies    Novocain (Unknown)    Intolerances        REVIEW OF SYSTEMS:  CONSTITUTIONAL: No fever, weight loss, or fatigue  EYES: No eye pain, visual disturbances  ENMT:  No difficulty hearing, tinnitus, vertigo; No sinus or throat pain  NECK: No pain or stiffness  RESPIRATORY: No cough, wheezing, chills or hemoptysis; No shortness of breath  CARDIOVASCULAR: No chest pain, palpitations, dizziness  GASTROINTESTINAL: No abdominal or epigastric pain. No nausea, vomiting, or hematemesis; No diarrhea or constipation. No melena or hematochezia.  GENITOURINARY: No dysuria, frequency, hematuria, or incontinence  NEUROLOGICAL: No headaches, memory loss, loss of strength, numbness, or tremors  SKIN: No itching, burning  LYMPH NODES: No enlarged glands  MUSCULOSKELETAL: No joint pain or swelling; No muscle, back, or extremity pain  PSYCHIATRIC: No depression, mood swings  HEME/LYMPH: No easy bruising, or bleeding gums  ALLERGY AND IMMUNOLOGIC: No hives    Vital Signs Last 24 Hrs  T(C): 36.3 (02 Aug 2019 10:08), Max: 36.8 (02 Aug 2019 00:13)  T(F): 97.4 (02 Aug 2019 10:08), Max: 98.3 (02 Aug 2019 00:13)  HR: 67 (02 Aug 2019 10:08) (66 - 71)  BP: 132/73 (02 Aug 2019 10:08) (117/70 - 165/82)  BP(mean): --  RR: 18 (02 Aug 2019 10:08) (16 - 18)  SpO2: 96% (02 Aug 2019 10:08) (95% - 97%)    PHYSICAL EXAM:  GENERAL: NAD, well-groomed, well-developed  HEAD:  Atraumatic, Normocephalic  EYES: EOMI, PERRLA, conjunctiva and sclera clear  ENMT: No tonsillar erythema, exudates, or enlargement   NECK: Supple, No JVD  NERVOUS SYSTEM:  Alert & Oriented   CHEST/LUNG: Clear to auscultation bilaterally; No rales, rhonchi, wheezing  HEART: Regular rate and rhythm  ABDOMEN: Soft, Nontender, Nondistended; Bowel sounds present  EXTREMITIES:  2+ Peripheral Pulses   LYMPH: No lymphadenopathy noted  SKIN: No rashes     LABS:      Ca    10.0       01 Aug 2019 08:05          CAPILLARY BLOOD GLUCOSE                RADIOLOGY & ADDITIONAL TESTS:  no new  Consultant(s) Notes Reviewed:  [ x] YES  [ ] NO    Care Discussed with Consultants/Other Providers [x ] YES  [ ] NO    Advanced care planning discussed with patient and family, advanced care planning forms reviewed, discussed, and completed.  20 minutes spent.
Patient is a 78y old  Male who presents with a chief complaint of sob and anxiety with elevated bp (2019 06:15)      INTERVAL HPI/OVERNIGHT EVENTS: chart noted, bp still elevated, meds adjusted, social work to see    MEDICATIONS  (STANDING):  amLODIPine   Tablet 10 milliGRAM(s) Oral daily  aspirin enteric coated 81 milliGRAM(s) Oral daily  clonazePAM  Tablet 0.5 milliGRAM(s) Oral two times a day  doxazosin 1 milliGRAM(s) Oral at bedtime  enalapril 20 milliGRAM(s) Oral two times a day  enoxaparin Injectable 40 milliGRAM(s) SubCutaneous daily  escitalopram 20 milliGRAM(s) Oral daily  hydrALAZINE 50 milliGRAM(s) Oral every 8 hours  labetalol 200 milliGRAM(s) Oral two times a day  QUEtiapine 25 milliGRAM(s) Oral <User Schedule>  tiotropium 18 MICROgram(s) Capsule 1 Capsule(s) Inhalation daily    MEDICATIONS  (PRN):  ALBUTerol    90 MICROgram(s) HFA Inhaler 2 Puff(s) Inhalation every 6 hours PRN Shortness of Breath and/or Wheezing      Allergies    Novocain (Unknown)    Intolerances        REVIEW OF SYSTEMS:  CONSTITUTIONAL: No fever, weight loss, or fatigue  EYES: No eye pain, visual disturbances  ENMT:  No difficulty hearing, tinnitus, vertigo; No sinus or throat pain  NECK: No pain or stiffness  RESPIRATORY: No cough, wheezing, chills or hemoptysis; No shortness of breath  CARDIOVASCULAR: No chest pain, palpitations, dizziness  GASTROINTESTINAL: No abdominal or epigastric pain. No nausea, vomiting, or hematemesis; No diarrhea or constipation. No melena or hematochezia.  GENITOURINARY: No dysuria, frequency, hematuria, or incontinence  NEUROLOGICAL: No headaches, memory loss, loss of strength, numbness, or tremors  SKIN: No itching, burning  LYMPH NODES: No enlarged glands  MUSCULOSKELETAL: No joint pain or swelling; No muscle, back, or extremity pain  PSYCHIATRIC: depression with anxiety  HEME/LYMPH: No easy bruising, or bleeding gums  ALLERGY AND IMMUNOLOGIC: No hives    Vital Signs Last 24 Hrs  T(C): 36.9 (2019 09:15), Max: 37.1 (2019 21:33)  T(F): 98.4 (2019 09:15), Max: 98.7 (2019 21:33)  HR: 69 (2019 09:15) (67 - 85)  BP: 168/87 (2019 09:15) (134/74 - 172/84)  BP(mean): --  RR: 18 (2019 09:15) (18 - 22)  SpO2: 95% (2019 09:15) (94% - 98%)    PHYSICAL EXAM:  GENERAL: NAD, well-groomed, well-developed  HEAD:  Atraumatic, Normocephalic  EYES: EOMI, PERRLA, conjunctiva and sclera clear  ENMT: No tonsillar erythema, exudates, or enlargement   NECK: Supple, No JVD  NERVOUS SYSTEM:  Alert & Oriented X3   CHEST/LUNG: Clear to auscultation bilaterally; No rales, rhonchi, wheezing  HEART: Regular rate and rhythm  ABDOMEN: Soft, Nontender, Nondistended; Bowel sounds present  EXTREMITIES:  2+ Peripheral Pulses   LYMPH: No lymphadenopathy noted  SKIN: No rashes     LABS:                        11.3   5.80  )-----------( 231      ( 2019 12:06 )             35.2     2019 12:06    139    |  106    |  10     ----------------------------<  145    3.0     |  24     |  1.13     Ca    9.7        2019 12:06      PT/INR - ( 2019 08:43 )   PT: 10.7 sec;   INR: 0.98 ratio         PTT - ( 2019 08:43 )  PTT:27.9 sec  Urinalysis Basic - ( 2019 08:51 )    Color: Yellow / Appearance: Clear / S.010 / pH: x  Gluc: x / Ketone: Trace  / Bili: Negative / Urobili: Negative mg/dL   Blood: x / Protein: 30 mg/dL / Nitrite: Negative   Leuk Esterase: Trace / RBC: 0-2 /HPF / WBC 3-5   Sq Epi: x / Non Sq Epi: Few / Bacteria: Occasional      CAPILLARY BLOOD GLUCOSE      POCT Blood Glucose.: 131 mg/dL (2019 09:32)            RADIOLOGY & ADDITIONAL TESTS:        Consultant(s) Notes Reviewed:  [x ] YES  [ ] NO    Care Discussed with Consultants/Other Providers x[ ] YES  [ ] NO    Advanced care planning discussed with patient and family, advanced care planning forms reviewed, discussed, and completed.  20 minutes spent.
Patient is a 78y old  Male who presents with a chief complaint of sob and anxiety with elevated bp (26 Jul 2019 06:08)      INTERVAL HPI/OVERNIGHT EVENTS: pt with improved bp, feels well, now to be evaluated by sw for dc planning that is safe    MEDICATIONS  (STANDING):  amLODIPine   Tablet 10 milliGRAM(s) Oral daily  aspirin enteric coated 81 milliGRAM(s) Oral daily  clonazePAM  Tablet 0.5 milliGRAM(s) Oral two times a day  doxazosin 1 milliGRAM(s) Oral at bedtime  enalapril 20 milliGRAM(s) Oral two times a day  enoxaparin Injectable 40 milliGRAM(s) SubCutaneous daily  escitalopram 20 milliGRAM(s) Oral daily  hydrALAZINE 50 milliGRAM(s) Oral every 8 hours  labetalol 200 milliGRAM(s) Oral two times a day  QUEtiapine 25 milliGRAM(s) Oral <User Schedule>  tiotropium 18 MICROgram(s) Capsule 1 Capsule(s) Inhalation daily    MEDICATIONS  (PRN):  ALBUTerol    90 MICROgram(s) HFA Inhaler 2 Puff(s) Inhalation every 6 hours PRN Shortness of Breath and/or Wheezing      Allergies    Novocain (Unknown)    Intolerances        REVIEW OF SYSTEMS:  CONSTITUTIONAL: No fever, weight loss, or fatigue  EYES: No eye pain, visual disturbances  ENMT:  No difficulty hearing, tinnitus, vertigo; No sinus or throat pain  NECK: No pain or stiffness  RESPIRATORY: No cough, wheezing, chills or hemoptysis; No shortness of breath  CARDIOVASCULAR: No chest pain, palpitations, dizziness  GASTROINTESTINAL: No abdominal or epigastric pain. No nausea, vomiting, or hematemesis; No diarrhea or constipation. No melena or hematochezia.  GENITOURINARY: No dysuria, frequency, hematuria, or incontinence  NEUROLOGICAL: No headaches, memory loss, loss of strength, numbness, or tremors  SKIN: No itching, burning  LYMPH NODES: No enlarged glands  MUSCULOSKELETAL: No joint pain or swelling; No muscle, back, or extremity pain  PSYCHIATRIC: depression  HEME/LYMPH: No easy bruising, or bleeding gums  ALLERGY AND IMMUNOLOGIC: No hives    Vital Signs Last 24 Hrs  T(C): 37.1 (26 Jul 2019 08:45), Max: 37.1 (26 Jul 2019 08:45)  T(F): 98.7 (26 Jul 2019 08:45), Max: 98.7 (26 Jul 2019 08:45)  HR: 68 (26 Jul 2019 08:45) (66 - 72)  BP: 122/72 (26 Jul 2019 08:45) (122/72 - 161/89)  BP(mean): --  RR: 17 (26 Jul 2019 08:45) (17 - 18)  SpO2: 95% (26 Jul 2019 08:45) (95% - 98%)    PHYSICAL EXAM:  GENERAL: NAD, well-groomed, well-developed  HEAD:  Atraumatic, Normocephalic  EYES: EOMI, PERRLA, conjunctiva and sclera clear  ENMT: No tonsillar erythema, exudates, or enlargement   NECK: Supple, No JVD  NERVOUS SYSTEM:  Alert & Oriented   CHEST/LUNG: Clear to auscultation bilaterally; No rales, rhonchi, wheezing  HEART: Regular rate and rhythm  ABDOMEN: Soft, Nontender, Nondistended; Bowel sounds present  EXTREMITIES:  2+ Peripheral Pulses   LYMPH: No lymphadenopathy noted  SKIN: No rashes     LABS:    26 Jul 2019 08:13    139    |  106    |  9      ----------------------------<  97     3.8     |  27     |  0.94     Ca    9.6        26 Jul 2019 08:13          CAPILLARY BLOOD GLUCOSE                RADIOLOGY &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

## 2019-08-03 NOTE — DISCHARGE NOTE NURSING/CASE MANAGEMENT/SOCIAL WORK - NSDCDPATPORTLINK_GEN_ALL_CORE
You can access the [facility] Patient Portal, offered by [facility], by registering with the following website: [email or web address]

## 2019-09-19 PROCEDURE — 93005 ELECTROCARDIOGRAM TRACING: CPT

## 2019-09-19 PROCEDURE — 85027 COMPLETE CBC AUTOMATED: CPT

## 2019-09-19 PROCEDURE — 80048 BASIC METABOLIC PNL TOTAL CA: CPT

## 2019-09-19 PROCEDURE — 71046 X-RAY EXAM CHEST 2 VIEWS: CPT

## 2019-09-19 PROCEDURE — 85730 THROMBOPLASTIN TIME PARTIAL: CPT

## 2019-09-19 PROCEDURE — 83880 ASSAY OF NATRIURETIC PEPTIDE: CPT

## 2019-09-19 PROCEDURE — 85610 PROTHROMBIN TIME: CPT

## 2019-09-19 PROCEDURE — 82803 BLOOD GASES ANY COMBINATION: CPT

## 2019-09-19 PROCEDURE — 36415 COLL VENOUS BLD VENIPUNCTURE: CPT

## 2019-09-19 PROCEDURE — 84443 ASSAY THYROID STIM HORMONE: CPT

## 2019-09-19 PROCEDURE — 80053 COMPREHEN METABOLIC PANEL: CPT

## 2019-09-19 PROCEDURE — 97161 PT EVAL LOW COMPLEX 20 MIN: CPT

## 2019-09-19 PROCEDURE — 82962 GLUCOSE BLOOD TEST: CPT

## 2019-09-19 PROCEDURE — 84484 ASSAY OF TROPONIN QUANT: CPT

## 2019-09-19 PROCEDURE — 94640 AIRWAY INHALATION TREATMENT: CPT

## 2019-09-19 PROCEDURE — 81001 URINALYSIS AUTO W/SCOPE: CPT

## 2019-09-19 PROCEDURE — 99285 EMERGENCY DEPT VISIT HI MDM: CPT | Mod: 25

## 2020-04-19 ENCOUNTER — EMERGENCY (EMERGENCY)
Facility: HOSPITAL | Age: 80
LOS: 1 days | Discharge: HOME | End: 2020-04-19
Attending: EMERGENCY MEDICINE
Payer: MEDICARE

## 2020-04-19 VITALS
WEIGHT: 184.97 LBS | HEART RATE: 87 BPM | DIASTOLIC BLOOD PRESSURE: 95 MMHG | SYSTOLIC BLOOD PRESSURE: 165 MMHG | HEIGHT: 72 IN | RESPIRATION RATE: 16 BRPM | TEMPERATURE: 99 F | OXYGEN SATURATION: 99 %

## 2020-04-19 DIAGNOSIS — Z90.89 ACQUIRED ABSENCE OF OTHER ORGANS: Chronic | ICD-10-CM

## 2020-04-19 PROCEDURE — 99283 EMERGENCY DEPT VISIT LOW MDM: CPT

## 2020-04-19 NOTE — ED PROVIDER NOTE - PSYCHIATRIC, MLM
Alert and oriented to person, place, time/situation. normal mood some upset . no apparent risk to self or others.

## 2020-04-19 NOTE — ED PROVIDER NOTE - ENMT, MLM
Airway patent, Nasal mucosa clear. Mouth with normal mucosa. Throat has no vesicles, no oropharyngeal exudates and uvula is midline. B/ l TM clear
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
## 2020-04-19 NOTE — ED PROVIDER NOTE - PROGRESS NOTE DETAILS
Zahida: DERICK spoke to pt's son and neighbor Madi, pt has money and to have cab arranged, Madi will be waiting and make sure pt gets into house safely. Madi is to help pt get car back tomorrow. pt clear for d/c.
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
## 2020-04-19 NOTE — ED PROVIDER NOTE - PATIENT PORTAL LINK FT
You can access the FollowMyHealth Patient Portal offered by [facility] by registering at the following website: [email or web address]. By joining [employer]’s FollowMyHealth portal, you will also be able to view your health information using other applications (apps) compatible with our system.

## 2020-04-20 ENCOUNTER — INPATIENT (INPATIENT)
Facility: HOSPITAL | Age: 80
LOS: 1 days | Discharge: NURSING, REHAB OR LONG-TERM CARE | DRG: 884 | End: 2020-04-22
Attending: FAMILY MEDICINE | Admitting: FAMILY MEDICINE
Payer: MEDICARE

## 2020-04-20 VITALS
DIASTOLIC BLOOD PRESSURE: 67 MMHG | WEIGHT: 169.98 LBS | TEMPERATURE: 99 F | OXYGEN SATURATION: 94 % | HEIGHT: 72 IN | HEART RATE: 72 BPM | SYSTOLIC BLOOD PRESSURE: 110 MMHG | RESPIRATION RATE: 187 BRPM

## 2020-04-20 DIAGNOSIS — Z90.89 ACQUIRED ABSENCE OF OTHER ORGANS: Chronic | ICD-10-CM

## 2020-04-20 DIAGNOSIS — N28.9 DISORDER OF KIDNEY AND URETER, UNSPECIFIED: ICD-10-CM

## 2020-04-20 LAB
ALBUMIN SERPL ELPH-MCNC: 3.9 G/DL — SIGNIFICANT CHANGE UP (ref 3.3–5)
ALP SERPL-CCNC: 109 U/L — SIGNIFICANT CHANGE UP (ref 30–120)
ALT FLD-CCNC: 18 U/L DA — SIGNIFICANT CHANGE UP (ref 10–60)
ANION GAP SERPL CALC-SCNC: 8 MMOL/L — SIGNIFICANT CHANGE UP (ref 5–17)
AST SERPL-CCNC: 14 U/L — SIGNIFICANT CHANGE UP (ref 10–40)
BASOPHILS # BLD AUTO: 0.04 K/UL — SIGNIFICANT CHANGE UP (ref 0–0.2)
BASOPHILS NFR BLD AUTO: 0.5 % — SIGNIFICANT CHANGE UP (ref 0–2)
BILIRUB SERPL-MCNC: 0.7 MG/DL — SIGNIFICANT CHANGE UP (ref 0.2–1.2)
BUN SERPL-MCNC: 13 MG/DL — SIGNIFICANT CHANGE UP (ref 7–23)
CALCIUM SERPL-MCNC: 10.5 MG/DL — SIGNIFICANT CHANGE UP (ref 8.4–10.5)
CHLORIDE SERPL-SCNC: 103 MMOL/L — SIGNIFICANT CHANGE UP (ref 96–108)
CO2 SERPL-SCNC: 28 MMOL/L — SIGNIFICANT CHANGE UP (ref 22–31)
CREAT SERPL-MCNC: 1.56 MG/DL — HIGH (ref 0.5–1.3)
EOSINOPHIL # BLD AUTO: 0.05 K/UL — SIGNIFICANT CHANGE UP (ref 0–0.5)
EOSINOPHIL NFR BLD AUTO: 0.6 % — SIGNIFICANT CHANGE UP (ref 0–6)
GLUCOSE SERPL-MCNC: 108 MG/DL — HIGH (ref 70–99)
HCT VFR BLD CALC: 40.3 % — SIGNIFICANT CHANGE UP (ref 39–50)
HGB BLD-MCNC: 13.4 G/DL — SIGNIFICANT CHANGE UP (ref 13–17)
IMM GRANULOCYTES NFR BLD AUTO: 0.3 % — SIGNIFICANT CHANGE UP (ref 0–1.5)
LIDOCAIN IGE QN: 61 U/L — LOW (ref 73–393)
LYMPHOCYTES # BLD AUTO: 1.47 K/UL — SIGNIFICANT CHANGE UP (ref 1–3.3)
LYMPHOCYTES # BLD AUTO: 19 % — SIGNIFICANT CHANGE UP (ref 13–44)
MCHC RBC-ENTMCNC: 27.2 PG — SIGNIFICANT CHANGE UP (ref 27–34)
MCHC RBC-ENTMCNC: 33.3 GM/DL — SIGNIFICANT CHANGE UP (ref 32–36)
MCV RBC AUTO: 81.7 FL — SIGNIFICANT CHANGE UP (ref 80–100)
MONOCYTES # BLD AUTO: 0.65 K/UL — SIGNIFICANT CHANGE UP (ref 0–0.9)
MONOCYTES NFR BLD AUTO: 8.4 % — SIGNIFICANT CHANGE UP (ref 2–14)
NEUTROPHILS # BLD AUTO: 5.51 K/UL — SIGNIFICANT CHANGE UP (ref 1.8–7.4)
NEUTROPHILS NFR BLD AUTO: 71.2 % — SIGNIFICANT CHANGE UP (ref 43–77)
NRBC # BLD: 0 /100 WBCS — SIGNIFICANT CHANGE UP (ref 0–0)
PLATELET # BLD AUTO: 222 K/UL — SIGNIFICANT CHANGE UP (ref 150–400)
POTASSIUM SERPL-MCNC: 3.2 MMOL/L — LOW (ref 3.5–5.3)
POTASSIUM SERPL-SCNC: 3.2 MMOL/L — LOW (ref 3.5–5.3)
PROT SERPL-MCNC: 7.1 G/DL — SIGNIFICANT CHANGE UP (ref 6–8.3)
RBC # BLD: 4.93 M/UL — SIGNIFICANT CHANGE UP (ref 4.2–5.8)
RBC # FLD: 14.7 % — HIGH (ref 10.3–14.5)
SODIUM SERPL-SCNC: 139 MMOL/L — SIGNIFICANT CHANGE UP (ref 135–145)
WBC # BLD: 7.74 K/UL — SIGNIFICANT CHANGE UP (ref 3.8–10.5)
WBC # FLD AUTO: 7.74 K/UL — SIGNIFICANT CHANGE UP (ref 3.8–10.5)

## 2020-04-20 PROCEDURE — 99223 1ST HOSP IP/OBS HIGH 75: CPT | Mod: AI

## 2020-04-20 PROCEDURE — 71045 X-RAY EXAM CHEST 1 VIEW: CPT | Mod: 26

## 2020-04-20 PROCEDURE — 99285 EMERGENCY DEPT VISIT HI MDM: CPT

## 2020-04-20 PROCEDURE — 70450 CT HEAD/BRAIN W/O DYE: CPT | Mod: 26

## 2020-04-20 RX ORDER — LABETALOL HCL 100 MG
200 TABLET ORAL
Refills: 0 | Status: DISCONTINUED | OUTPATIENT
Start: 2020-04-20 | End: 2020-04-20

## 2020-04-20 RX ORDER — ENOXAPARIN SODIUM 100 MG/ML
40 INJECTION SUBCUTANEOUS DAILY
Refills: 0 | Status: DISCONTINUED | OUTPATIENT
Start: 2020-04-20 | End: 2020-04-22

## 2020-04-20 RX ORDER — HYDRALAZINE HCL 50 MG
50 TABLET ORAL EVERY 8 HOURS
Refills: 0 | Status: DISCONTINUED | OUTPATIENT
Start: 2020-04-20 | End: 2020-04-22

## 2020-04-20 RX ORDER — QUETIAPINE FUMARATE 200 MG/1
50 TABLET, FILM COATED ORAL DAILY
Refills: 0 | Status: DISCONTINUED | OUTPATIENT
Start: 2020-04-20 | End: 2020-04-22

## 2020-04-20 RX ORDER — SODIUM CHLORIDE 9 MG/ML
3 INJECTION INTRAMUSCULAR; INTRAVENOUS; SUBCUTANEOUS ONCE
Refills: 0 | Status: COMPLETED | OUTPATIENT
Start: 2020-04-20 | End: 2020-04-20

## 2020-04-20 RX ORDER — ESCITALOPRAM OXALATE 10 MG/1
20 TABLET, FILM COATED ORAL DAILY
Refills: 0 | Status: DISCONTINUED | OUTPATIENT
Start: 2020-04-20 | End: 2020-04-22

## 2020-04-20 RX ORDER — HYDRALAZINE HCL 50 MG
50 TABLET ORAL EVERY 8 HOURS
Refills: 0 | Status: DISCONTINUED | OUTPATIENT
Start: 2020-04-20 | End: 2020-04-20

## 2020-04-20 RX ORDER — AMLODIPINE BESYLATE 2.5 MG/1
10 TABLET ORAL DAILY
Refills: 0 | Status: DISCONTINUED | OUTPATIENT
Start: 2020-04-20 | End: 2020-04-20

## 2020-04-20 RX ORDER — ASPIRIN/CALCIUM CARB/MAGNESIUM 324 MG
81 TABLET ORAL DAILY
Refills: 0 | Status: DISCONTINUED | OUTPATIENT
Start: 2020-04-20 | End: 2020-04-22

## 2020-04-20 RX ORDER — CLONAZEPAM 1 MG
0.5 TABLET ORAL
Refills: 0 | Status: DISCONTINUED | OUTPATIENT
Start: 2020-04-20 | End: 2020-04-22

## 2020-04-20 RX ORDER — AMLODIPINE BESYLATE 2.5 MG/1
10 TABLET ORAL DAILY
Refills: 0 | Status: DISCONTINUED | OUTPATIENT
Start: 2020-04-20 | End: 2020-04-22

## 2020-04-20 RX ORDER — POTASSIUM CHLORIDE 20 MEQ
40 PACKET (EA) ORAL ONCE
Refills: 0 | Status: COMPLETED | OUTPATIENT
Start: 2020-04-20 | End: 2020-04-20

## 2020-04-20 RX ORDER — DOXAZOSIN MESYLATE 4 MG
1 TABLET ORAL DAILY
Refills: 0 | Status: DISCONTINUED | OUTPATIENT
Start: 2020-04-20 | End: 2020-04-22

## 2020-04-20 RX ORDER — SODIUM CHLORIDE 9 MG/ML
1000 INJECTION INTRAMUSCULAR; INTRAVENOUS; SUBCUTANEOUS ONCE
Refills: 0 | Status: COMPLETED | OUTPATIENT
Start: 2020-04-20 | End: 2020-04-20

## 2020-04-20 RX ORDER — TIOTROPIUM BROMIDE 18 UG/1
1 CAPSULE ORAL; RESPIRATORY (INHALATION) DAILY
Refills: 0 | Status: DISCONTINUED | OUTPATIENT
Start: 2020-04-20 | End: 2020-04-22

## 2020-04-20 RX ADMIN — SODIUM CHLORIDE 1000 MILLILITER(S): 9 INJECTION INTRAMUSCULAR; INTRAVENOUS; SUBCUTANEOUS at 19:15

## 2020-04-20 RX ADMIN — Medication 40 MILLIEQUIVALENT(S): at 19:15

## 2020-04-20 RX ADMIN — SODIUM CHLORIDE 3 MILLILITER(S): 9 INJECTION INTRAMUSCULAR; INTRAVENOUS; SUBCUTANEOUS at 17:27

## 2020-04-20 RX ADMIN — Medication 50 MILLIGRAM(S): at 23:15

## 2020-04-20 NOTE — H&P ADULT - NSHPREVIEWOFSYSTEMS_GEN_ALL_CORE
REVIEW OF SYSTEMS:  CONSTITUTIONAL: No fever, weight loss, or fatigue  EYES: No eye pain, visual disturbances, or discharge  ENMT:  No difficulty hearing, tinnitus, vertigo; No sinus or throat pain  NECK: No pain or stiffness  BREASTS: No pain, masses, or nipple discharge  RESPIRATORY: No cough, wheezing, chills or hemoptysis; No shortness of breath  CARDIOVASCULAR: No chest pain, palpitations, dizziness, or leg swelling  GASTROINTESTINAL: No abdominal or epigastric pain. No nausea, vomiting, or hematemesis; No diarrhea or constipation. No melena or hematochezia.  GENITOURINARY: No dysuria, frequency, hematuria, or incontinence  NEUROLOGICAL: No headaches, noted (+)   memory loss,  noted (+) confusion,  no   loss of strength,  no  numbness, or tremors  SKIN: No itching, burning, rashes, or lesions   LYMPH NODES: No enlarged glands  ENDOCRINE: No heat or cold intolerance; No hair loss; No polydipsia or polyuria  MUSCULOSKELETAL: No joint pain or swelling; No muscle, back, or extremity pain  PSYCHIATRIC: Noted  depression,  noted  anxiety, mood swings, or difficulty sleeping  HEME/LYMPH: No easy bruising, or bleeding gums  ALLERGY AND IMMUNOLOGIC: No hives or eczema

## 2020-04-20 NOTE — H&P ADULT - NSICDXPASTMEDICALHX_GEN_ALL_CORE_FT
PAST MEDICAL HISTORY:  Anxiety     Dementia, senile with depression     HLD (hyperlipidemia)     HTN (hypertension)
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
## 2020-04-20 NOTE — H&P ADULT - ASSESSMENT
1   confused state,  and hx  dementia, Pt on home  meds and history in the past of chronic dementia. Pt lives alone and will need social work to seek        safe placment needs.     2  Admit.....to medical floor  for regular diet,  to continue present meds and get PT/OT eval  for  falls risk evaluation.     3    Hypertension .     treat and monitor BP,  continue present meds    4. psych eval in  Am.....for capacity.  pt is confused. and lives alone....and has poor judgement and insight.     5. treat with anxidepressants...lexapro, and continue other psych meds.     6. CKD  monitor  lytes , noted elevated BUN/Creatine. Probably chronic in this patient who is a  poor historian. 1   confused state,  and hx  dementia, Pt on home  meds and history in the past of chronic dementia. Pt lives alone and will need social work to seek        safe placment needs.     2  Admit.....to medical floor  for regular diet,  to continue present meds and get PT/OT eval  for  falls risk evaluation.     3    Hypertension .     treat and monitor BP,  continue present meds, To hold labetolol 200 mg bid,  pt on several strong rx  (amlodipine 10,  Hydralazine  50 tid and labetolol 200 bid  for pt with elevated BUN/Creatine and age, and risk of falls etc..     4. psych eval in  Am.....for capacity.  pt is confused. and lives alone....and has poor judgement and insight.     5. treat with anxidepressants...lexapro, and continue other psych meds.     6. CKD  monitor  lytes , noted elevated BUN/Creatine. Probably chronic in this patient who is a  poor historian. 1   confused state,  and hx  dementia, Pt on home  meds and history in the past of chronic dementia. Pt lives alone and will need social work to seek        safe placment needs.     2  Admit.....to medical floor  for regular diet,  to continue present meds and get PT/OT eval  for  falls risk evaluation.     3    Hypertension .     treat and monitor BP,  continue present meds, To hold labetolol 200 mg bid,  pt on several strong rx  (amlodipine 10,  Hydralazine  50 tid and labetolol 200 bid  for pt with elevated BUN/Creatine and age, and risk of falls etc..     4. psych eval in  Am.....for capacity.  pt is confused. and lives alone....and has poor judgement and insight.     5. treat with anxidepressants...lexapro, and continue other psych meds.     6. CKD  monitor  lytes , noted elevated BUN/Creatine. Probably chronic in this patient who is a  poor historian.      7  on Sprivia for COPD ,

## 2020-04-20 NOTE — ED PROVIDER NOTE - CARE PLAN
Principal Discharge DX:	Acute renal insufficiency  Secondary Diagnosis:	Dementia  Secondary Diagnosis:	Anxiety  Secondary Diagnosis:	Depression
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
## 2020-04-20 NOTE — H&P ADULT - NSHPLABSRESULTS_GEN_ALL_CORE
Labs:                          13.4   7.74  )-----------( 222      ( 20 Apr 2020 17:32 )             40.3       04-20    139  |  103  |  13  ----------------------------<  108<H>  3.2<L>   |  28  |  1.56<H>    Ca    10.5      20 Apr 2020 17:32    TPro  7.1  /  Alb  3.9  /  TBili  0.7  /  DBili  x   /  AST  14  /  ALT  18  /  AlkPhos  109  04-20                      Lactate Trend            CAPILLARY BLOOD GLUCOSE          EKG:   Personally Reviewed:  [ ] YES     Imaging:   Personally Reviewed:  [ ] YES

## 2020-04-20 NOTE — ED PROVIDER NOTE - CLINICAL SUMMARY MEDICAL DECISION MAKING FREE TEXT BOX
80 y/o male presenting with AMS and anxiety in the setting of diagnosed depression and dementia. Currently residing home alone. He is unable to care for himself. Plan: Will need admission for social concerns. Call placed to son with no answer. Will obtain screening labs, EKG, chest XR, and plan for admission.

## 2020-04-20 NOTE — H&P ADULT - NSHPPHYSICALEXAM_GEN_ALL_CORE
PHYSICAL EXAM:  Vital Signs Last 24 Hrs  T(C): 37.1 (20 Apr 2020 16:50), Max: 37.1 (20 Apr 2020 16:50)  T(F): 98.8 (20 Apr 2020 16:50), Max: 98.8 (20 Apr 2020 16:50)  HR: 72 (20 Apr 2020 16:50) (72 - 72)  BP: 110/67 (20 Apr 2020 16:50) (110/67 - 110/67)  BP(mean): --  RR: 18 (20 Apr 2020 16:50) (18 - 187)  SpO2: 94% (20 Apr 2020 16:50) (94% - 94%)    GENERAL: NAD, well-groomed, well-developed  HEAD:  Atraumatic, Normocephalic  EYES: EOMI, PERRLA, conjunctiva and sclera clear  ENMT: No tonsillar erythema, exudates, or enlargement; Moist mucous membranes, Good dentition, No lesions  NECK: Supple, No JVD, Normal thyroid  NERVOUS SYSTEM:  Alert & Oriented X2  , Fair  concentration; Motor Strength 5/5 B/L upper and lower extremities;  CHEST/LUNG: Clear to auscultation bilaterally; No rales, rhonchi, wheezing, or rubs  HEART: Regular rate and rhythm; No murmurs, rubs, or gallops  ABDOMEN: Soft, Nontender, Nondistended; Bowel sounds present  EXTREMITIES:  2+ Peripheral Pulses, No clubbing, cyanosis, or edema  LYMPH: No lymphadenopathy noted  SKIN: No rashes or lesion  PSYCH:   depressed affect and anxious mood,  Hx  dementia with mild confusion, and cooperative

## 2020-04-20 NOTE — ED PROVIDER NOTE - NEUROLOGICAL, MLM
Alert and oriented to person and place with intermittent confusion to year and present events. no focal deficits, no motor or sensory deficits.

## 2020-04-20 NOTE — ED ADULT NURSE REASSESSMENT NOTE - NS ED NURSE REASSESS COMMENT FT1
meal provided, tolerating po
pt resting comfortably, improvement noted, in no acute distress. Respirations are even and unlabored. pt voices no complaints at this time. pt  updated and aware of plan of care. pt repositioned in bed, pt hemodynamically stable. will cont to monitor.
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
## 2020-04-21 LAB
ANION GAP SERPL CALC-SCNC: 5 MMOL/L — SIGNIFICANT CHANGE UP (ref 5–17)
APPEARANCE UR: CLEAR — SIGNIFICANT CHANGE UP
BASOPHILS # BLD AUTO: 0.03 K/UL — SIGNIFICANT CHANGE UP (ref 0–0.2)
BASOPHILS NFR BLD AUTO: 0.6 % — SIGNIFICANT CHANGE UP (ref 0–2)
BILIRUB UR-MCNC: ABNORMAL
BUN SERPL-MCNC: 14 MG/DL — SIGNIFICANT CHANGE UP (ref 7–23)
CALCIUM SERPL-MCNC: 9.9 MG/DL — SIGNIFICANT CHANGE UP (ref 8.4–10.5)
CHLORIDE SERPL-SCNC: 105 MMOL/L — SIGNIFICANT CHANGE UP (ref 96–108)
CO2 SERPL-SCNC: 30 MMOL/L — SIGNIFICANT CHANGE UP (ref 22–31)
COLOR SPEC: YELLOW — SIGNIFICANT CHANGE UP
CREAT SERPL-MCNC: 1.26 MG/DL — SIGNIFICANT CHANGE UP (ref 0.5–1.3)
DIFF PNL FLD: NEGATIVE — SIGNIFICANT CHANGE UP
EOSINOPHIL # BLD AUTO: 0.07 K/UL — SIGNIFICANT CHANGE UP (ref 0–0.5)
EOSINOPHIL NFR BLD AUTO: 1.3 % — SIGNIFICANT CHANGE UP (ref 0–6)
FOLATE SERPL-MCNC: 4.4 NG/ML — LOW
GLUCOSE SERPL-MCNC: 112 MG/DL — HIGH (ref 70–99)
GLUCOSE UR QL: NEGATIVE MG/DL — SIGNIFICANT CHANGE UP
HCT VFR BLD CALC: 40 % — SIGNIFICANT CHANGE UP (ref 39–50)
HGB BLD-MCNC: 12.8 G/DL — LOW (ref 13–17)
HIV 1+2 AB+HIV1 P24 AG SERPL QL IA: SIGNIFICANT CHANGE UP
IMM GRANULOCYTES NFR BLD AUTO: 0.4 % — SIGNIFICANT CHANGE UP (ref 0–1.5)
KETONES UR-MCNC: ABNORMAL
LEUKOCYTE ESTERASE UR-ACNC: ABNORMAL
LYMPHOCYTES # BLD AUTO: 0.95 K/UL — LOW (ref 1–3.3)
LYMPHOCYTES # BLD AUTO: 17.8 % — SIGNIFICANT CHANGE UP (ref 13–44)
MAGNESIUM SERPL-MCNC: 1.9 MG/DL — SIGNIFICANT CHANGE UP (ref 1.6–2.6)
MCHC RBC-ENTMCNC: 26.7 PG — LOW (ref 27–34)
MCHC RBC-ENTMCNC: 32 GM/DL — SIGNIFICANT CHANGE UP (ref 32–36)
MCV RBC AUTO: 83.3 FL — SIGNIFICANT CHANGE UP (ref 80–100)
MONOCYTES # BLD AUTO: 0.52 K/UL — SIGNIFICANT CHANGE UP (ref 0–0.9)
MONOCYTES NFR BLD AUTO: 9.8 % — SIGNIFICANT CHANGE UP (ref 2–14)
NEUTROPHILS # BLD AUTO: 3.74 K/UL — SIGNIFICANT CHANGE UP (ref 1.8–7.4)
NEUTROPHILS NFR BLD AUTO: 70.1 % — SIGNIFICANT CHANGE UP (ref 43–77)
NITRITE UR-MCNC: NEGATIVE — SIGNIFICANT CHANGE UP
NRBC # BLD: 0 /100 WBCS — SIGNIFICANT CHANGE UP (ref 0–0)
PH UR: 5 — SIGNIFICANT CHANGE UP (ref 5–8)
PLATELET # BLD AUTO: 186 K/UL — SIGNIFICANT CHANGE UP (ref 150–400)
POTASSIUM SERPL-MCNC: 3.6 MMOL/L — SIGNIFICANT CHANGE UP (ref 3.5–5.3)
POTASSIUM SERPL-SCNC: 3.6 MMOL/L — SIGNIFICANT CHANGE UP (ref 3.5–5.3)
PROT UR-MCNC: 30 MG/DL
RBC # BLD: 4.8 M/UL — SIGNIFICANT CHANGE UP (ref 4.2–5.8)
RBC # FLD: 14.9 % — HIGH (ref 10.3–14.5)
SARS-COV-2 RNA SPEC QL NAA+PROBE: SIGNIFICANT CHANGE UP
SODIUM SERPL-SCNC: 140 MMOL/L — SIGNIFICANT CHANGE UP (ref 135–145)
SP GR SPEC: 1.02 — SIGNIFICANT CHANGE UP (ref 1.01–1.02)
T PALLIDUM AB TITR SER: NEGATIVE — SIGNIFICANT CHANGE UP
TSH SERPL-MCNC: 1.25 UIU/ML — SIGNIFICANT CHANGE UP (ref 0.27–4.2)
UROBILINOGEN FLD QL: 1 MG/DL
VIT B12 SERPL-MCNC: 384 PG/ML — SIGNIFICANT CHANGE UP (ref 232–1245)
WBC # BLD: 5.33 K/UL — SIGNIFICANT CHANGE UP (ref 3.8–10.5)
WBC # FLD AUTO: 5.33 K/UL — SIGNIFICANT CHANGE UP (ref 3.8–10.5)

## 2020-04-21 PROCEDURE — 99233 SBSQ HOSP IP/OBS HIGH 50: CPT

## 2020-04-21 RX ADMIN — Medication 0.5 MILLIGRAM(S): at 05:20

## 2020-04-21 RX ADMIN — TIOTROPIUM BROMIDE 1 CAPSULE(S): 18 CAPSULE ORAL; RESPIRATORY (INHALATION) at 05:19

## 2020-04-21 RX ADMIN — ENOXAPARIN SODIUM 40 MILLIGRAM(S): 100 INJECTION SUBCUTANEOUS at 10:56

## 2020-04-21 RX ADMIN — Medication 50 MILLIGRAM(S): at 05:19

## 2020-04-21 RX ADMIN — Medication 50 MILLIGRAM(S): at 21:14

## 2020-04-21 RX ADMIN — Medication 1 MILLIGRAM(S): at 05:20

## 2020-04-21 RX ADMIN — Medication 20 MILLIGRAM(S): at 05:20

## 2020-04-21 RX ADMIN — QUETIAPINE FUMARATE 50 MILLIGRAM(S): 200 TABLET, FILM COATED ORAL at 10:56

## 2020-04-21 RX ADMIN — AMLODIPINE BESYLATE 10 MILLIGRAM(S): 2.5 TABLET ORAL at 05:20

## 2020-04-21 RX ADMIN — Medication 81 MILLIGRAM(S): at 10:56

## 2020-04-21 RX ADMIN — ESCITALOPRAM OXALATE 20 MILLIGRAM(S): 10 TABLET, FILM COATED ORAL at 10:56

## 2020-04-21 RX ADMIN — Medication 20 MILLIGRAM(S): at 17:08

## 2020-04-21 RX ADMIN — Medication 0.5 MILLIGRAM(S): at 17:08
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
## 2020-04-21 NOTE — PROGRESS NOTE ADULT - ASSESSMENT
1   confused state,  and hx  dementia, Pt on home  meds and history in the past of chronic dementia. Pt lives alone and will need social work to seek        safe placment needs.     2  Admit.....to medical floor  for regular diet,  to continue present meds and get PT/OT eval  for  falls risk evaluation.     3    Hypertension .     treat and monitor BP,  continue present meds, To hold labetolol 200 mg bid,  pt on several strong rx  (amlodipine 10,  Hydralazine  50 tid and labetolol 200 bid  for pt with elevated BUN/Creatine and age, and risk of falls etc..     4. psych eval in  Am.....for capacity.  pt is confused. and lives alone....and has poor judgement and insight.     5. treat with anxidepressants...lexapro, and continue other psych meds.     6. CKD  monitor  lytes , noted elevated BUN/Creatine. Probably chronic in this patient who is a  poor historian.      7  on Sprivia for COPD , Pt is a 78 yo M w PMH of dementia, HTN, HLD who presents with worsening confusion. Social work is attempting placement and contacting APS    Dementia, depression  - pt appears to lack capacity at this time  - continue escitalopram, unclear if pt was complaint with home regimen  - TSH, B12, folate, RPR, HIV labs pending   - pending placement to SNF    HTN  - continue amlodipine 10 QD  - continue hydralazine 50 TID with hold parameters  - holding labetalol      2  Admit.....to medical floor  for regular diet,  to continue present meds and get PT/OT eval  for  falls risk evaluation.     3    Hypertension .     treat and monitor BP,  continue present meds, To hold labetolol 200 mg bid,  pt on several strong rx  (amlodipine 10,  Hydralazine  50 tid and labetolol 200 bid  for pt with elevated BUN/Creatine and age, and risk of falls etc..     4. psych eval in  Am.....for capacity.  pt is confused. and lives alone....and has poor judgement and insight.     5. treat with anxidepressants...lexapro, and continue other psych meds.     6. CKD  monitor  lytes , noted elevated BUN/Creatine. Probably chronic in this patient who is a  poor historian.      7  on Sprivia for COPD , Pt is a 78 yo M w PMH of dementia, HTN, HLD who presents with worsening confusion. Social work is attempting placement and contacting APS    Dementia, depression  - pt appears to lack capacity at this time  - continue escitalopram, unclear if pt was complaint with home regimen  - TSH, B12, folate, RPR, HIV labs pending   - PT for falls risk evaluation  - pending placement to SNF    HTN  - continue amlodipine 10 QD  - continue hydralazine 50 TID with hold parameters  - holding labetalol    DIVINE  - BUN/Cr falling, will monitor    - unknown if Hx of CKD, patient is a poor historian.      COPD  - Covid-negative  - continue home spiriva      Regular diet  Lovenox for ppx

## 2020-04-21 NOTE — PHYSICAL THERAPY INITIAL EVALUATION ADULT - ADDITIONAL COMMENTS
Pt lives alone in a house w/ 3 steps to enter with rail.  Pt bedroom/bathroom on the first floor.  Pt reports that he has no DME

## 2020-04-21 NOTE — PROGRESS NOTE ADULT - SUBJECTIVE AND OBJECTIVE BOX
Patient is a 79y old  Male who presents with a chief complaint of DIVINE, Change of mental status, Huypertension (2020 19:33)      INTERVAL HPI/OVERNIGHT EVENTS:    MEDICATIONS  (STANDING):  amLODIPine   Tablet 10 milliGRAM(s) Oral daily  aspirin enteric coated 81 milliGRAM(s) Oral daily  clonazePAM  Tablet 0.5 milliGRAM(s) Oral two times a day  doxazosin Oral Tab/Cap - Peds 1 milliGRAM(s) Oral daily  enalapril 20 milliGRAM(s) Oral two times a day  enoxaparin Injectable 40 milliGRAM(s) SubCutaneous daily  escitalopram 20 milliGRAM(s) Oral daily  hydrALAZINE 50 milliGRAM(s) Oral every 8 hours  QUEtiapine 50 milliGRAM(s) Oral daily  tiotropium 18 MICROgram(s) Capsule 1 Capsule(s) Inhalation daily    MEDICATIONS  (PRN):      Allergies    Novocain (Unknown)    Intolerances        REVIEW OF SYSTEMS:  CONSTITUTIONAL: No fever, weight loss, or fatigue  EYES: No eye pain, visual disturbances, or discharge  ENMT:  No difficulty hearing, tinnitus, vertigo; No sinus or throat pain  NECK: No pain or stiffness  RESPIRATORY: No cough, wheezing, chills or hemoptysis; No shortness of breath  CARDIOVASCULAR: No chest pain, palpitations, lightheadedness, or leg swelling  GASTROINTESTINAL: No abdominal or epigastric pain. No nausea, vomiting, or hematemesis; No diarrhea or constipation. No melena or hematochezia.  GENITOURINARY: No dysuria, frequency, hematuria, or incontinence  NEUROLOGICAL: No headaches, memory loss, vertigo, loss of strength, numbness, or tremors  SKIN: No itching, burning, rashes, or lesions   LYMPH NODES: No enlarged glands  ENDOCRINE: No heat or cold intolerance; No hair loss; No polydipsia or polyuria  MUSCULOSKELETAL: No joint pain or swelling; No muscle, back, or extremity pain  PSYCHIATRIC: No depression, anxiety, or mood swings  HEME/LYMPH: No easy bruising, or bleeding gums  ALLERGY AND IMMUNOLOGIC: No hives or eczema    PHYSICAL EXAM:  GENERAL: NAD, well-groomed, well-developed  HEAD:  Atraumatic, Normocephalic  EYES: EOMI, PERRLA, conjunctiva and sclera clear  ENMT: Moist mucous membranes, Good dentition, No lesions  NECK: Supple, No JVD appreciated  NERVOUS SYSTEM:  Alert & Oriented X3, Good concentration; All 4 extremities mobile, no gross sensory deficits.   CHEST/LUNG: Clear to auscultation bilaterally; No rales, rhonchi, wheezing, or rubs appreciated  HEART: Regular rate and rhythm; No murmurs, rubs, or gallops  ABDOMEN: Soft, Nontender, Nondistended; Bowel sounds present  EXTREMITIES:  2+ Peripheral Pulses, No clubbing, cyanosis, or edema  LYMPH: No lymphadenopathy noted  SKIN: No rashes or lesions    Vital Signs Last 24 Hrs  T(C): 36.9 (2020 16:40), Max: 37.2 (2020 21:42)  T(F): 98.5 (2020 16:40), Max: 98.9 (2020 21:42)  HR: 74 (2020 16:40) (68 - 75)  BP: 111/72 (2020 16:40) (111/72 - 166/88)  BP(mean): --  RR: 16 (2020 16:40) (16 - 18)  SpO2: 96% (2020 16:40) (96% - 98%)    LABS:                        12.8   5.33  )-----------( 186      ( 2020 12:09 )             40.0     2020 12:09    140    |  105    |  14     ----------------------------<  112    3.6     |  30     |  1.26     Ca    9.9        2020 12:09  Mg     1.9       2020 12:09        Urinalysis Basic - ( 2020 06:50 )    Color: Yellow / Appearance: Clear / S.020 / pH: x  Gluc: x / Ketone: Trace  / Bili: Small / Urobili: 1 mg/dL   Blood: x / Protein: 30 mg/dL / Nitrite: Negative   Leuk Esterase: Trace / RBC: x / WBC 0-2   Sq Epi: x / Non Sq Epi: Few / Bacteria: Few      CAPILLARY BLOOD GLUCOSE          RADIOLOGY & ADDITIONAL TESTS:    Imaging Personally Reviewed:  [ ] YES     Consultant(s) Notes Reviewed:      Care Discussed with Consultants/Other Providers:    Advanced Directives: [ ] DNR  [ ] No feeding tube  [ ] MOLST in chart  [ ] MOLST completed today  [ ] Unknown Patient is a 79y old  Male who presents with a chief complaint of DIVINE, Change of mental status, Huypertension (2020 19:33)      INTERVAL HPI/OVERNIGHT EVENTS:  Patient seen awake in bed. He denies pain, denies fever or chills, denies N/V/D, denies chest or abdominal pain. Patient appears confused and does not know where he is. He became tearful when speaking about home and reports he is lonely. Reports decreased appetite, decreased energy, decreased interest in activities, sleeping poor, denies SI.     MEDICATIONS  (STANDING):  amLODIPine   Tablet 10 milliGRAM(s) Oral daily  aspirin enteric coated 81 milliGRAM(s) Oral daily  clonazePAM  Tablet 0.5 milliGRAM(s) Oral two times a day  doxazosin Oral Tab/Cap - Peds 1 milliGRAM(s) Oral daily  enalapril 20 milliGRAM(s) Oral two times a day  enoxaparin Injectable 40 milliGRAM(s) SubCutaneous daily  escitalopram 20 milliGRAM(s) Oral daily  hydrALAZINE 50 milliGRAM(s) Oral every 8 hours  QUEtiapine 50 milliGRAM(s) Oral daily  tiotropium 18 MICROgram(s) Capsule 1 Capsule(s) Inhalation daily    MEDICATIONS  (PRN):      Allergies    Novocain (Unknown)    Intolerances        REVIEW OF SYSTEMS:  CONSTITUTIONAL: No fever, weight loss, or fatigue  EYES: No eye pain, visual disturbances, or discharge  ENMT:  No difficulty hearing, tinnitus, vertigo; No sinus or throat pain  NECK: No pain or stiffness  RESPIRATORY: No cough, wheezing, chills or hemoptysis; No shortness of breath  CARDIOVASCULAR: No chest pain, palpitations, lightheadedness, or leg swelling  GASTROINTESTINAL: No abdominal or epigastric pain. No nausea, vomiting, or hematemesis; No diarrhea or constipation. No melena or hematochezia.  GENITOURINARY: No dysuria, frequency, hematuria, or incontinence  NEUROLOGICAL: No headaches, memory loss, vertigo, loss of strength, numbness, or tremors  SKIN: No itching, burning, rashes, or lesions   LYMPH NODES: No enlarged glands  ENDOCRINE: No heat or cold intolerance; No hair loss; No polydipsia or polyuria  MUSCULOSKELETAL: No joint pain or swelling; No muscle, back, or extremity pain  PSYCHIATRIC: No depression, anxiety, or mood swings  HEME/LYMPH: No easy bruising, or bleeding gums  ALLERGY AND IMMUNOLOGIC: No hives or eczema    PHYSICAL EXAM:  GENERAL: NAD, well-groomed, well-developed  HEAD:  Atraumatic, Normocephalic  EYES: EOMI, PERRLA, conjunctiva and sclera clear  ENMT: Moist mucous membranes, Good dentition, No lesions  NECK: Supple, No JVD appreciated  NERVOUS SYSTEM:  Alert & Oriented X1, Good concentration; All 4 extremities mobile, no gross sensory deficits.   CHEST/LUNG: Clear to auscultation bilaterally; No rales, rhonchi, wheezing, or rubs appreciated  HEART: Regular rate and rhythm; No murmurs, rubs, or gallops  ABDOMEN: Soft, Nontender, Nondistended; Bowel sounds present  EXTREMITIES:  2+ Peripheral Pulses, No clubbing, cyanosis, or edema  LYMPH: No lymphadenopathy noted  SKIN: No rashes or lesions  PSYCH: tearful mood, normal affect.     Vital Signs Last 24 Hrs  T(C): 36.9 (2020 16:40), Max: 37.2 (2020 21:42)  T(F): 98.5 (2020 16:40), Max: 98.9 (2020 21:42)  HR: 74 (2020 16:40) (68 - 75)  BP: 111/72 (2020 16:40) (111/72 - 166/88)  BP(mean): --  RR: 16 (2020 16:40) (16 - 18)  SpO2: 96% (2020 16:40) (96% - 98%)    LABS:                        12.8   5.33  )-----------( 186      ( 2020 12:09 )             40.0     2020 12:09    140    |  105    |  14     ----------------------------<  112    3.6     |  30     |  1.26     Ca    9.9        2020 12:09  Mg     1.9       2020 12:09        Urinalysis Basic - ( 2020 06:50 )    Color: Yellow / Appearance: Clear / S.020 / pH: x  Gluc: x / Ketone: Trace  / Bili: Small / Urobili: 1 mg/dL   Blood: x / Protein: 30 mg/dL / Nitrite: Negative   Leuk Esterase: Trace / RBC: x / WBC 0-2   Sq Epi: x / Non Sq Epi: Few / Bacteria: Few      CAPILLARY BLOOD GLUCOSE

## 2020-04-22 ENCOUNTER — TRANSCRIPTION ENCOUNTER (OUTPATIENT)
Age: 80
End: 2020-04-22

## 2020-04-22 VITALS
DIASTOLIC BLOOD PRESSURE: 83 MMHG | TEMPERATURE: 98 F | RESPIRATION RATE: 18 BRPM | SYSTOLIC BLOOD PRESSURE: 165 MMHG | OXYGEN SATURATION: 97 % | HEART RATE: 80 BPM

## 2020-04-22 LAB
ANION GAP SERPL CALC-SCNC: 6 MMOL/L — SIGNIFICANT CHANGE UP (ref 5–17)
BUN SERPL-MCNC: 14 MG/DL — SIGNIFICANT CHANGE UP (ref 7–23)
CALCIUM SERPL-MCNC: 10.1 MG/DL — SIGNIFICANT CHANGE UP (ref 8.4–10.5)
CHLORIDE SERPL-SCNC: 103 MMOL/L — SIGNIFICANT CHANGE UP (ref 96–108)
CO2 SERPL-SCNC: 29 MMOL/L — SIGNIFICANT CHANGE UP (ref 22–31)
CREAT SERPL-MCNC: 1.21 MG/DL — SIGNIFICANT CHANGE UP (ref 0.5–1.3)
CULTURE RESULTS: SIGNIFICANT CHANGE UP
GLUCOSE SERPL-MCNC: 102 MG/DL — HIGH (ref 70–99)
HCT VFR BLD CALC: 40.2 % — SIGNIFICANT CHANGE UP (ref 39–50)
HGB BLD-MCNC: 13.1 G/DL — SIGNIFICANT CHANGE UP (ref 13–17)
MAGNESIUM SERPL-MCNC: 2 MG/DL — SIGNIFICANT CHANGE UP (ref 1.6–2.6)
MCHC RBC-ENTMCNC: 26.8 PG — LOW (ref 27–34)
MCHC RBC-ENTMCNC: 32.6 GM/DL — SIGNIFICANT CHANGE UP (ref 32–36)
MCV RBC AUTO: 82.4 FL — SIGNIFICANT CHANGE UP (ref 80–100)
NRBC # BLD: 0 /100 WBCS — SIGNIFICANT CHANGE UP (ref 0–0)
PLATELET # BLD AUTO: 191 K/UL — SIGNIFICANT CHANGE UP (ref 150–400)
POTASSIUM SERPL-MCNC: 3.4 MMOL/L — LOW (ref 3.5–5.3)
POTASSIUM SERPL-SCNC: 3.4 MMOL/L — LOW (ref 3.5–5.3)
RBC # BLD: 4.88 M/UL — SIGNIFICANT CHANGE UP (ref 4.2–5.8)
RBC # FLD: 15.1 % — HIGH (ref 10.3–14.5)
SODIUM SERPL-SCNC: 138 MMOL/L — SIGNIFICANT CHANGE UP (ref 135–145)
SPECIMEN SOURCE: SIGNIFICANT CHANGE UP
WBC # BLD: 5.21 K/UL — SIGNIFICANT CHANGE UP (ref 3.8–10.5)
WBC # FLD AUTO: 5.21 K/UL — SIGNIFICANT CHANGE UP (ref 3.8–10.5)

## 2020-04-22 PROCEDURE — 83735 ASSAY OF MAGNESIUM: CPT

## 2020-04-22 PROCEDURE — 97116 GAIT TRAINING THERAPY: CPT

## 2020-04-22 PROCEDURE — 80053 COMPREHEN METABOLIC PANEL: CPT

## 2020-04-22 PROCEDURE — 83690 ASSAY OF LIPASE: CPT

## 2020-04-22 PROCEDURE — 87389 HIV-1 AG W/HIV-1&-2 AB AG IA: CPT

## 2020-04-22 PROCEDURE — 81001 URINALYSIS AUTO W/SCOPE: CPT

## 2020-04-22 PROCEDURE — 85027 COMPLETE CBC AUTOMATED: CPT

## 2020-04-22 PROCEDURE — 71045 X-RAY EXAM CHEST 1 VIEW: CPT

## 2020-04-22 PROCEDURE — 80048 BASIC METABOLIC PNL TOTAL CA: CPT

## 2020-04-22 PROCEDURE — 99285 EMERGENCY DEPT VISIT HI MDM: CPT

## 2020-04-22 PROCEDURE — 97110 THERAPEUTIC EXERCISES: CPT

## 2020-04-22 PROCEDURE — 82607 VITAMIN B-12: CPT

## 2020-04-22 PROCEDURE — 94640 AIRWAY INHALATION TREATMENT: CPT

## 2020-04-22 PROCEDURE — 84443 ASSAY THYROID STIM HORMONE: CPT

## 2020-04-22 PROCEDURE — 86780 TREPONEMA PALLIDUM: CPT

## 2020-04-22 PROCEDURE — 99238 HOSP IP/OBS DSCHRG MGMT 30/<: CPT

## 2020-04-22 PROCEDURE — 70450 CT HEAD/BRAIN W/O DYE: CPT

## 2020-04-22 PROCEDURE — 87635 SARS-COV-2 COVID-19 AMP PRB: CPT

## 2020-04-22 PROCEDURE — 97161 PT EVAL LOW COMPLEX 20 MIN: CPT

## 2020-04-22 PROCEDURE — 36415 COLL VENOUS BLD VENIPUNCTURE: CPT

## 2020-04-22 PROCEDURE — 87086 URINE CULTURE/COLONY COUNT: CPT

## 2020-04-22 PROCEDURE — 82746 ASSAY OF FOLIC ACID SERUM: CPT

## 2020-04-22 RX ORDER — AMLODIPINE BESYLATE 2.5 MG/1
1 TABLET ORAL
Qty: 0 | Refills: 0 | DISCHARGE
Start: 2020-04-22

## 2020-04-22 RX ORDER — ASPIRIN/CALCIUM CARB/MAGNESIUM 324 MG
1 TABLET ORAL
Qty: 0 | Refills: 0 | DISCHARGE

## 2020-04-22 RX ORDER — DOXAZOSIN MESYLATE 4 MG
1 TABLET ORAL
Qty: 0 | Refills: 0 | DISCHARGE

## 2020-04-22 RX ORDER — POTASSIUM CHLORIDE 20 MEQ
40 PACKET (EA) ORAL ONCE
Refills: 0 | Status: COMPLETED | OUTPATIENT
Start: 2020-04-22 | End: 2020-04-22

## 2020-04-22 RX ORDER — TIOTROPIUM BROMIDE 18 UG/1
1 CAPSULE ORAL; RESPIRATORY (INHALATION)
Qty: 0 | Refills: 0 | DISCHARGE
Start: 2020-04-22

## 2020-04-22 RX ORDER — DOXAZOSIN MESYLATE 4 MG
1 TABLET ORAL
Qty: 0 | Refills: 0 | DISCHARGE
Start: 2020-04-22

## 2020-04-22 RX ORDER — ESCITALOPRAM OXALATE 10 MG/1
1 TABLET, FILM COATED ORAL
Qty: 0 | Refills: 0 | DISCHARGE
Start: 2020-04-22

## 2020-04-22 RX ORDER — CLONAZEPAM 1 MG
1 TABLET ORAL
Qty: 0 | Refills: 0 | DISCHARGE
Start: 2020-04-22

## 2020-04-22 RX ORDER — CLONAZEPAM 1 MG
1 TABLET ORAL
Qty: 0 | Refills: 0 | DISCHARGE

## 2020-04-22 RX ORDER — ESCITALOPRAM OXALATE 10 MG/1
1 TABLET, FILM COATED ORAL
Qty: 0 | Refills: 0 | DISCHARGE

## 2020-04-22 RX ORDER — QUETIAPINE FUMARATE 200 MG/1
1 TABLET, FILM COATED ORAL
Qty: 0 | Refills: 0 | DISCHARGE
Start: 2020-04-22

## 2020-04-22 RX ORDER — ASPIRIN/CALCIUM CARB/MAGNESIUM 324 MG
1 TABLET ORAL
Qty: 0 | Refills: 0 | DISCHARGE
Start: 2020-04-22

## 2020-04-22 RX ORDER — AMLODIPINE BESYLATE 2.5 MG/1
1 TABLET ORAL
Qty: 0 | Refills: 0 | DISCHARGE

## 2020-04-22 RX ORDER — HYDRALAZINE HCL 50 MG
1 TABLET ORAL
Qty: 0 | Refills: 0 | DISCHARGE
Start: 2020-04-22

## 2020-04-22 RX ADMIN — AMLODIPINE BESYLATE 10 MILLIGRAM(S): 2.5 TABLET ORAL at 05:51

## 2020-04-22 RX ADMIN — Medication 20 MILLIGRAM(S): at 05:51

## 2020-04-22 RX ADMIN — Medication 1 MILLIGRAM(S): at 05:51

## 2020-04-22 RX ADMIN — TIOTROPIUM BROMIDE 1 CAPSULE(S): 18 CAPSULE ORAL; RESPIRATORY (INHALATION) at 05:51

## 2020-04-22 RX ADMIN — Medication 50 MILLIGRAM(S): at 13:41

## 2020-04-22 RX ADMIN — Medication 81 MILLIGRAM(S): at 11:48

## 2020-04-22 RX ADMIN — Medication 40 MILLIEQUIVALENT(S): at 11:48

## 2020-04-22 RX ADMIN — ESCITALOPRAM OXALATE 20 MILLIGRAM(S): 10 TABLET, FILM COATED ORAL at 11:48

## 2020-04-22 RX ADMIN — Medication 50 MILLIGRAM(S): at 05:51

## 2020-04-22 RX ADMIN — Medication 0.5 MILLIGRAM(S): at 05:51

## 2020-04-22 RX ADMIN — ENOXAPARIN SODIUM 40 MILLIGRAM(S): 100 INJECTION SUBCUTANEOUS at 11:48

## 2020-04-22 NOTE — DISCHARGE NOTE NURSING/CASE MANAGEMENT/SOCIAL WORK - PATIENT PORTAL LINK FT
You can access the FollowMyHealth Patient Portal offered by [facility] by registering at the following website: [email or web address]. By joining [facility]’s FollowMyHealth portal, you will also be able to view your health information using other applications (apps) compatible with our system.

## 2020-04-22 NOTE — DISCHARGE NOTE PROVIDER - NSDCCPCAREPLAN_GEN_ALL_CORE_FT
PRINCIPAL DISCHARGE DIAGNOSIS  Diagnosis: Dementia  Assessment and Plan of Treatment:       SECONDARY DISCHARGE DIAGNOSES  Diagnosis: Acute kidney injury  Assessment and Plan of Treatment:     Diagnosis: Depression  Assessment and Plan of Treatment:     Diagnosis: Anxiety  Assessment and Plan of Treatment:     Diagnosis: Dementia  Assessment and Plan of Treatment:

## 2020-04-22 NOTE — DISCHARGE NOTE PROVIDER - NSDCMRMEDTOKEN_GEN_ALL_CORE_FT
amLODIPine 10 mg oral tablet: 1 tab(s) orally once a day  aspirin 81 mg oral delayed release tablet: 1 tab(s) orally once a day  clonazePAM 0.5 mg oral tablet: 1 tab(s) orally 2 times a day  doxazosin 1 mg oral tablet: 1 tab(s) orally once a day  enalapril 20 mg oral tablet: 1 tab(s) orally 2 times a day  escitalopram 20 mg oral tablet: 1 tab(s) orally once a day  hydrALAZINE 50 mg oral tablet: 1 tab(s) orally every 8 hours  QUEtiapine 50 mg oral tablet: 1 tab(s) orally once a day  tiotropium 18 mcg inhalation capsule: 1 cap(s) inhaled once a day

## 2020-04-22 NOTE — DISCHARGE NOTE PROVIDER - HOSPITAL COURSE
Pt is a 78 yo M w PMH of dementia, HTN, HLD who presented to ED with worsening confusion. Pt called crisis center and EMS transported pt to ED for evaluation. Pt noted by EMS to have an unkempt dwelling, with feces in multiple rooms. Pt was confused and alone in a home in total disarray. Pt presented aware of his name and location (ER)  but not month. Pt states he drives a car.    [facility] contacted.         PHYSICAL EXAM:    GENERAL: NAD, well-groomed, well-developed    HEAD:  Atraumatic, Normocephalic    EYES: EOMI, PERRLA, conjunctiva and sclera clear    ENMT: Moist mucous membranes, Good dentition, No lesions    NECK: Supple, No JVD appreciated    NERVOUS SYSTEM:  Alert & Oriented X1, Good concentration; All 4 extremities mobile, no gross sensory deficits.     CHEST/LUNG: Clear to auscultation bilaterally; No rales, rhonchi, wheezing, or rubs appreciated    HEART: Regular rate and rhythm; No murmurs, rubs, or gallops    ABDOMEN: Soft, Nontender, Nondistended; Bowel sounds present    EXTREMITIES:  2+ Peripheral Pulses, No clubbing, cyanosis, or edema    LYMPH: No lymphadenopathy noted    SKIN: No rashes or lesions    PSYCH: tearful mood, normal affect            Dementia, depression    - pt appears to lack capacity at this time    - continued escitalopram, seroquel, unclear if pt was complaint with home regimen    - TSH, B12, folate, RPR, HIV labs unremarkable    - PT consulted for falls risk evaluation, recommended rehab         HTN    - continued amlodipine 10 QD    - continue hydralazine 50 TID with hold parameters    - held labetalol        DIVINE    - BUN/Cr trended down    - unknown if Hx of CKD, patient is a poor historian.          COPD    - Covid-negative    - continue home spiriva        On day of discharge, patient was seen and deemed stable for transfer to rehab. Goals of care included falls risk, dementia management, hypertension management, and monitoring of renal function.

## 2020-04-23 RX ORDER — CLONAZEPAM 1 MG
1 TABLET ORAL
Qty: 14 | Refills: 0
Start: 2020-04-23 | End: 2020-04-29

## 2020-04-28 RX ORDER — CLONAZEPAM 1 MG
1 TABLET ORAL
Qty: 14 | Refills: 0
Start: 2020-04-28 | End: 2020-05-04

## 2020-05-13 RX ORDER — CLONAZEPAM 1 MG
1 TABLET ORAL
Qty: 28 | Refills: 0
Start: 2020-05-13 | End: 2020-05-26

## 2020-07-18 NOTE — ED ADULT NURSE NOTE - EENT ASSESSMENT, MLM
[FreeTextEntry1] : The patient has a symptomatic incisional hernia however his age and medical condition increase the risk of surgery significantly. Since his recent symptoms have improved surgery will be postponed indefinitely. THe son and patient were in agreement with this recommendation.
WDL

## 2020-09-25 NOTE — ED BEHAVIORAL HEALTH ASSESSMENT NOTE - KNOWN PSYCHIATRIC ADMISSION WITHIN THE PAST 30 DAYS
pt w concern for nasal abscess, no meningitic symptoms, no septal fluctuance, I&D completed by ENT. disc admission, pt preferring to go home fu in clinic tomorrow with Dr. Davis, abx given here in ED, return for worsening sx advised. No

## 2020-10-14 NOTE — DIETITIAN INITIAL EVALUATION ADULT. - NS FNS WEIGHT USED FOR CALC
166#/ideal Introduction Text (Please End With A Colon): The following procedure was deferred: This gets irritated and he would like to schedule to have it removed Detail Level: Detailed Instructions (Optional): Candida injections Ирина

## 2021-09-13 NOTE — PHYSICAL THERAPY INITIAL EVALUATION ADULT - PREDICTED DURATION OF THERAPY (DAYS/WKS), PT EVAL
[No Acute Distress] : no acute distress [Normal Sclera/Conjunctiva] : normal sclera/conjunctiva [Normal Outer Ear/Nose] : the outer ears and nose were normal in appearance [No Respiratory Distress] : no respiratory distress  [No Accessory Muscle Use] : no accessory muscle use [Normal Rate] : normal rate  [Regular Rhythm] : with a regular rhythm [Coordination Grossly Intact] : coordination grossly intact [No Focal Deficits] : no focal deficits [Normal Gait] : normal gait [Normal Affect] : the affect was normal [Normal Insight/Judgement] : insight and judgment were intact [de-identified] : no tonsular exudates, no tonsular erythema and no tonsular swelling, no maxillary tenderness  [de-identified] : no wheezing, no rhonci and no crackles  3-5 sessions

## 2021-09-20 NOTE — ED PROVIDER NOTE - NS ED MD DISPO DISCHARGE CCDA
PLEASE FOLLOW UP WITH YOUR PRIMARY CARE PHYSICIAN IN REGARD TO THIS URGENT CARE VISIT.    TREATMENT PLAN FOR TODAY'S VISIT:      Follow up with Dr. Castro this Wednesday 1.  Push oral fluids      Orthostatic hypotension is defined as a decrease in systolic blood pressure of 20 mm Hg or a decrease in diastolic blood pressure of 10 mm Hg within three minutes of standing when compared with blood pressure from the sitting or supine position.   It results from an inadequate physiologic response to postural changes in blood pressure.   Orthostatic hypotension may be acute or chronic, as well as symptomatic or asymptomatic.     Common symptoms include dizziness, lightheadedness, blurred vision, weakness, fatigue, nausea, palpitations, and headache. Less common symptoms include syncope, dyspnea, chest pain, and neck and shoulder pain.     Causes include dehydration or blood loss; disorders of the neurologic, cardiovascular, or endocrine systems; and several classes of medications.    Patient/Caregiver provided printed discharge information.

## 2021-12-27 NOTE — ED ADULT TRIAGE NOTE - PRO INTERPRETER NEED 2
Chief Complaint   Patient presents with    6 Month Follow-Up       HPI: Patient is here today to follow-up diabetes hypertension and other medical problems recently had surgery on his right shoulder doing okay.   He had right rotator cuff repair    Past Medical History:   Diagnosis Date    Allergic rhinitis     Bacterial folliculitis 2/30/0896    Erectile dysfunction 5/4/2018    Essential hypertension 7/31/2017    Gastroesophageal reflux disease without esophagitis 07/31/2017    Hyperglycemia     Hyperlipidemia     Hypertension     Hypokalemia     Osteoarthritis     Recurrent bronchospasm 7/31/2017    Seasonal allergies 7/31/2017    Shoulder pain     Spondylosis of lumbar region without myelopathy or radiculopathy 7/31/2017    Type 2 diabetes mellitus without complication, without long-term current use of insulin ([facility] [address] 7/31/2017    BORDERLINE       Past Surgical History:   Procedure Laterality Date    CHOLECYSTECTOMY  5/12/14    CLEFT LIP REPAIR  1956, 1962    COLONOSCOPY  06/23/2011    COLONOSCOPY  2017    ENDOSCOPY, COLON, DIAGNOSTIC      JOINT REPLACEMENT      [place] EGD TRANSORAL BIOPSY SINGLE/MULTIPLE N/A 10/10/2016    Dr DANIAL Cai-Chemical gastropathy/gastritis    SHOULDER ARTHROSCOPY Right 11/18/2021    RIGHT SHOULDER ARTHROSCOPY ROTATOR CUFF REPAIR/ SUBACROMIAL DECOMPRESSION/DISTAL CLAVICLE RESECTION/BICEPS TENODESIS performed by Abdulaziz Argueta MD at [place] [address] Right 4/1/2021    RIGHT TOTAL KNEE ARTHROPLASTY performed by Abdulaziz Argueta MD at [address] [place] [address] ENDOSCOPY N/A 2/4/2019    Dr Cecilia Pepe distal esophagitis, gastritis       Family History   Problem Relation Age of Onset    Diabetes Sister     High Blood Pressure Sister     High Blood Pressure Maternal Grandmother     High Blood Pressure Mother     High Blood Pressure Father     Cancer Other     Colon Cancer Neg Hx     Colon Polyps Neg Hx     Liver Cancer Neg Hx     Liver Disease Neg Hx     Rectal Cancer Neg Hx     Stomach Cancer Neg Hx     Esophageal Cancer Neg Hx        Social History     Socioeconomic History    Marital status:      Spouse name: Not on file    Number of children: Not on file    Years of education: Not on file    Highest education level: Not on file   Occupational History    Not on file   Tobacco Use    Smoking status: Former Smoker     Packs/day: 0.25     Years: 10.00     Pack years: 2.50     Types: Cigarettes     Quit date:      Years since quittin.0    Smokeless tobacco: Never Used    Tobacco comment: Retired from [employer] Use    Vaping Use: Never used   Substance and Sexual Activity    Alcohol use: No    Drug use: No    Sexual activity: Not on file   Other Topics Concern    Not on file   Social History Narrative    Not on file     Social Determinants of Health     Financial Resource Strain: Low Risk     Difficulty of Paying Living Expenses: Not very hard   Food Insecurity: No Food Insecurity    Worried About Running Out of Food in the Last Year: Never true    Luis of Food in the Last Year: Never true   Transportation Needs:     Lack of Transportation (Medical): Not on file    Lack of Transportation (Non-Medical):  Not on file   Physical Activity:     Days of Exercise per Week: Not on file    Minutes of Exercise per Session: Not on file   Stress:     Feeling of Stress : Not on file   Social Connections:     Frequency of Communication with Friends and Family: Not on file    Frequency of Social Gatherings with Friends and Family: Not on file    Attends [employer] Services: Not on file    Active Member of Clubs or Organizations: Not on file    Attends Club or Organization Meetings: Not on file    Marital Status: Not on file   Intimate Partner Violence:     Fear of Current or Ex-Partner: Not on file    Emotionally Abused: Not on file   Karin Bradshaw Physically Abused: Not on file    Sexually Abused: Not on file   Housing Stability:     Unable to Pay for Housing in the Last Year: Not on file    Number of Places Lived in the Last Year: Not on file    Unstable Housing in the Last Year: Not on file       Allergies   Allergen Reactions    Pcn [Penicillins] Rash     Can take Keflex (adulthood)    Tamiflu [Oseltamivir Phosphate] Rash       Current Outpatient Medications   Medication Sig Dispense Refill    cetirizine (ZYRTEC) 10 MG tablet Take 1 tablet by mouth daily 90 tablet 3    pantoprazole sodium (PROTONIX) 40 MG PACK packet Take 40 mg by mouth every morning (before breakfast) Indications: Reflux Gastritis      sildenafil (VIAGRA) 50 MG tablet Take 50 mg by mouth as needed for Erectile Dysfunction      sucralfate (CARAFATE) 1 GM tablet TAKE 1 TABLET THREE TIMES A DAY BEFORE MEALS (Patient taking differently: Take 1 g by mouth 3 times daily ) 90 tablet 11    NIFEdipine (ADALAT CC) 60 MG extended release tablet TAKE 1 TABLET DAILY AS DIRECTED (Patient taking differently: Take 60 mg by mouth daily TAKE 1 TABLET DAILY AS DIRECTED) 90 tablet 3    simvastatin (ZOCOR) 20 MG tablet TAKE 1 TABLET AT BEDTIME (Patient taking differently: Take 20 mg by mouth nightly TAKE 1 TABLET AT BEDTIME) 90 tablet 3    montelukast (SINGULAIR) 10 MG tablet TAKE 1 TABLET NIGHTLY (Patient taking differently: Take 10 mg by mouth nightly TAKE 1 TABLET NIGHTLY) 90 tablet 3    ADVAIR DISKUS 100-50 MCG/DOSE diskus inhaler USE 1 INHALATION EVERY 12 HOURS (Patient taking differently: Inhale 1 puff into the lungs daily ) 180 each 3    valsartan-hydroCHLOROthiazide (DIOVAN-HCT) 160-12.5 MG per tablet Take 1 tablet by mouth daily      Cholecalciferol (VITAMIN D3) 50 MCG (2000 UT) CAPS Take 1 capsule by mouth daily      potassium chloride (KLOR-CON M) 20 MEQ extended release tablet TAKE 1 TABLET DAILY (Patient taking differently: Take 20 mEq by mouth daily ) 90 tablet 3    ascorbic acid (VITAMIN C) 500 MG tablet Take 1,000 mg by mouth daily       Garlic 5341 MG CAPS Take 1,000 mg by mouth daily        No current facility-administered medications for this visit. Review of Systems    /64   Pulse 64   Temp 97.5 °F (36.4 °C)   Ht 5' 3\" (1.6 m)   Wt 183 lb (83 kg)   SpO2 94%   BMI 32.42 kg/m²   BP Readings from Last 7 Encounters:   12/27/21 134/64   11/18/21 135/66   11/18/21 139/64   06/29/21 120/64   04/12/21 (!) 128/56   04/04/21 (!) 143/64   04/01/21 (!) 89/51     Wt Readings from Last 7 Encounters:   12/27/21 183 lb (83 kg)   11/18/21 175 lb (79.4 kg)   11/10/21 175 lb (79.4 kg)   06/29/21 178 lb (80.7 kg)   04/12/21 188 lb (85.3 kg)   04/01/21 188 lb (85.3 kg)   03/30/21 188 lb (85.3 kg)     BMI Readings from Last 7 Encounters:   12/27/21 32.42 kg/m²   11/18/21 31.00 kg/m²   11/10/21 31.00 kg/m²   06/29/21 31.53 kg/m²   04/12/21 33.30 kg/m²   04/01/21 33.30 kg/m²   03/30/21 33.30 kg/m²     Resp Readings from Last 7 Encounters:   11/18/21 18   04/04/21 18   04/01/21 (!) 0   12/26/19 18   10/21/19 18   09/22/19 16   09/08/19 16       Physical Exam  Constitutional:       General: He is not in acute distress. Eyes:      General: No scleral icterus. Cardiovascular:      Heart sounds: Normal heart sounds. Pulmonary:      Breath sounds: Normal breath sounds. Musculoskeletal:      Cervical back: Neck supple. Comments: Right arm in sling with lift. Lymphadenopathy:      Cervical: No cervical adenopathy. Skin:     Findings: No rash.          Results for orders placed or performed in visit on 12/20/21   CBC   Result Value Ref Range    WBC 9.1 4.8 - 10.8 K/uL    RBC 4.78 4.70 - 6.10 M/uL    Hemoglobin 13.9 (L) 14.0 - 18.0 g/dL    Hematocrit 43.3 42.0 - 52.0 %    MCV 90.6 80.0 - 94.0 fL    MCH 29.1 27.0 - 31.0 pg    MCHC 32.1 (L) 33.0 - 37.0 g/dL    RDW 13.1 11.5 - 14.5 %    Platelets 948 882 - 531 K/uL    MPV 10.7 9.4 - 12.4 fL   Comprehensive Metabolic Panel   Result cuff arthropathy of right shoulder  Status post repair of right rotator cuff tear continue management as per Dr. Kaushik Lanier    We highly recommend the COVID-19 vaccine we have discussed this with the patient and his wife in the past they declined the vaccine they are high risk really encouraged him to consider this. English

## 2022-01-01 NOTE — ED ADULT TRIAGE NOTE - SPO2 (%)
98
Patient is a 21yo female tested positive for covid today. Patient states that she had a coughing fit and could not catch her breath. Patient states that she feels tightness in her throat and shortness of breath

## 2022-02-25 NOTE — ED PROVIDER NOTE - CONSTITUTIONAL MENTATION
[place]  Department of Pulmonary, Critical Care and Sleep Medicine  [address] [facility]  Department of Internal Medicine  Progress Note    SUBJECTIVE:    Patient seen and examined this morning while eating breakfast. He reports that from a breathing standpoint his symptoms have not really changed since admission he does feel that there is less swelling in his legs however still quite a bit of swelling in his feet. He did tell me that he does not understand what is causing the fluid buildup and feels that this initially started back in December when his antidiabetic medication had been changed to [address]. OBJECTIVE:  Vitals:    02/24/22 1033 02/24/22 1340 02/24/22 1619 02/25/22 0045   BP: 90/81  121/74 114/72   Pulse: 114  107 109   Resp: 18  18 17   Temp: 98.2 °F (36.8 °C)  97.7 °F (36.5 °C) 98.5 °F (36.9 °C)   TempSrc: Axillary  Oral Oral   SpO2: 98% 97% 95%    Weight:       Height:         Constitutional: Alert,     EENT: EOMI YOSEF. MMM. No icterus. No thrush. Neck: No thyromegaly. No JVD on my exam.  Respiratory: Breath sounds are diminished without use of accessory muscles. Cardiovascular: Regular, No murmur. No rubs. Pulses:  Equal bilaterally. Abdomen: Soft without organomegaly. No rebound, rigidity. No guarding. Lymphatic: No lymphadenopathy. Musculoskeletal: Without weakness or gross deficits  Extremities: Anasarca present patient with presacral edema. Skin:  Warm and dry. No skin rashes. Neurological/Psychiatric: No acute psychosis. Cranial nerves are intact. DATA:    Monitor Strips:  Reviewed & discusses with technical team. No changes noted.     RADIOLOGY:  Films were read/reviewed/discussed with radiology shows no new imaging today      CBC with Differential:    Lab Results   Component Value Date    WBC 8.2 02/25/2022    RBC 3.82 02/25/2022    HGB 10.9 02/25/2022    HCT 35.8 02/25/2022     02/25/2022    MCV 93.7 02/25/2022    MCH 28.5 02/25/2022    MCHC 30.4 02/25/2022    RDW 16.7 02/25/2022    LYMPHOPCT 19.9 02/23/2022    MONOPCT 11.6 02/23/2022    BASOPCT 0.3 02/23/2022    MONOSABS 1.10 02/23/2022    LYMPHSABS 1.89 02/23/2022    EOSABS 0.30 02/23/2022    BASOSABS 0.03 02/23/2022     CMP:    Lab Results   Component Value Date     02/25/2022    K 4.2 02/25/2022    K 4.1 02/23/2022     02/25/2022    CO2 29 02/25/2022    BUN 33 02/25/2022    CREATININE 2.3 02/25/2022    GFRAA 33 02/25/2022    LABGLOM 28 02/25/2022    GLUCOSE 109 02/25/2022    PROT 6.9 01/21/2022    LABALBU 3.5 02/25/2022    CALCIUM 8.8 02/25/2022    BILITOT 0.6 01/21/2022    ALKPHOS 127 01/21/2022    AST 32 01/21/2022    ALT 42 01/21/2022          Assessment:   1. Bilateral pleural effusion  2. Volume overload  3. CKD  4. Anasarca  5. Diabetes  6. Chronic hypoxemic respiratory insufficiency  7. SPEEDY     Plan:   1. Pleural effusions likely secondary to volume overload. Patient had previously responded well to aggressive diuresis. Pleural effusion is greater on the right side than the left would not recommend thoracentesis at this time  2. Continue to wean FiO2 as tolerated. Patient was not requiring oxygen prior to his initial admission in January. We will add humidification. 3. Albumin and prealbumin ordered  4. Recommend euglycemia  5. Continue home CPAP  6.  Diuretics as per nephrology     Thank you for the consult we will continue to follow           Polly Bocanegra DO   Pulmonary, Critical Care and Sleep Medicine awake/alert/ALTERED LOC/alert to person and place with intermittent confusion to year and present events

## 2023-01-11 NOTE — BEHAVIORAL HEALTH ASSESSMENT NOTE - ABUSE / TRAUMA HISTORY
Requested Prescriptions     Pending Prescriptions Disp Refills    budesonide-formoterol (SYMBICORT) 160-4.5 MCG/ACT AERO 10.2 g 3     Sig: INHALE 2 PUFFS BY MOUTH TWICE DAILY       Next appt is Visit date not found  Last appt was 12/7/2022
No

## 2023-08-04 NOTE — ED ADULT NURSE NOTE - CARDIO WDL
Oriented - self; Oriented - place; Oriented - time
Normal rate, regular rhythm, normal S1, S2 heart sounds heard.

## 2023-12-21 NOTE — ED ADULT NURSE NOTE - CHPI ED NUR TIMING2
[Cough] : cough [Heartburn] : heartburn [Muscle Weakness] : muscle weakness [Fever] : no fever [Chills] : no chills [Pain] : no pain [Vision Problems] : no vision problems [Nasal Discharge] : no nasal discharge [Sore Throat] : no sore throat [Chest Pain] : no chest pain [Palpitations] : no palpitations [Lower Ext Edema] : no lower extremity edema [Shortness Of Breath] : no shortness of breath [Dyspnea on Exertion] : no dyspnea on exertion [Abdominal Pain] : no abdominal pain [Constipation] : no constipation [Diarrhea] : diarrhea [Vomiting] : no vomiting [Melena] : no melena [Dysuria] : no dysuria [Hematuria] : no hematuria [Frequency] : no frequency [Muscle Pain] : no muscle pain [Itching] : no itching [Skin Rash] : no skin rash [Headache] : no headache [Fainting] : no fainting [Easy Bleeding] : no easy bleeding [Easy Bruising] : no easy bruising gradual onset

## 2024-04-12 NOTE — ED PROVIDER NOTE - CONSTITUTIONAL, MLM
electronic normal... Well appearing, well nourished, awake, alert, oriented to person, place, time/situation and in no apparent distress.

## 2024-09-27 NOTE — DISCHARGE NOTE PROVIDER - [id number]_NEU_ALL_CORE
[de-identified] : Ms. Chinchilla Walker is improving, albeit slowly. She is 14 weeks post-op at this stage. 
[de-identified] : Ms. Chinchilla Walker is improving, albeit slowly. She is 14 weeks post-op at this stage. 
No

## 2024-10-04 NOTE — DIETITIAN INITIAL EVALUATION ADULT. - ORAL INTAKE PTA
MEDICINE ADMISSION HISTORY AND PHYSICAL    Pino Sargent  : 1951 Sex: male MRN: [id number]  Date of Service: 10/4/2024       Chief Complaint:       Mechanical fall with traumatic subarachnoid hemorrhage    History of Present Illness:         73-year-old man with a past medical history significant for coronary artery disease, status post coronary artery bypass grafting, along with obstructive airway disease and mechanical aortic valve replacement with Dr. Asif on 2022 with left internal thoracic artery to the LAD and SVG graft to the first marginal.  He also has a history of peripheral vascular disease and has undergone lower extremity angiograms which have been unremarkable.  He has a history of a left inguinal hernia for which he underwent herniorrhaphy in . The patient has a remote history of Hodgkin's lymphoma in  and had been treated with 35 fractions of radiation to the neck and tonsil at that point.     The patient was admitted to the hospital 10/24/22 for elective robotic repair of incarcerated incisional hernia with bilateral posterior rectus sheath release and placement of Parietene mesh.  He suffered from a rectus sheath hematoma subsequently with Lovenox bridging.  He was able to be discharged home.    The patient is at home with his wife.  He has been having a number of falls after recent increase in his diuretics with a symptoms of dizziness.  He was brought into the emergency room on 10/1/2024.    Patient was noted to have an INR of 5.6 and a creatinine of 4.4.  CT scan of the head showed:    IMPRESSION:   Moderate bilateral areas of subarachnoid hemorrhage, left greater than  right. Findings are most pronounced in the frontal lobes bilaterally.     Patient was observed in the neuro ICU.  Follow-up MRI of the brain and MRA have been unremarkable.  Patient underwent an MRI of the thoracic spine which shows a T1 compression fracture.  Patient is surprisingly not having any back  pain.  His creatinine has improved with IV hydration and is down to his baseline at 2.04.  He has not been started back on a heparin drip.  Patient is being transferred to the ICU.     Medical/Surgical History :  Past Medical History:   Diagnosis Date    Aortic valve replaced 2012    19 mm [facility] mechanical    Arthritis     ALL OVER    Atrial fibrillation  (CMD)     BPH (benign prostatic hyperplasia)     CAD (coronary artery disease) 2012    2 vessel CABG    Cardiomyopathy (CMD)     Cataracts, bilateral     Cervical myelopathy  (CMD) 07/10/2015    Chronic diastolic heart failure  (CMD)     Claudication (CMD)     \"both legs get heavy feeling and tired/ cramping mainly  in right leg\"    Colon polyp 03/11/2022    dr ames, 6 tubular adenomas. recall 3 years    Colon polyp 05/08/2023    dr ames, tubular adenoma x4, recall 3 years    COPD (chronic obstructive pulmonary disease)  (CMD)     no oxygen    Difficulty swallowing solids 07/02/2017    coming for EGD    Diverticulosis 05/08/2023    Dr. Darling MONTELONGO (dyspnea on exertion)     Dyslipidemia     ED (erectile dysfunction)     Esophagitis 12/2014    Esophagitis, reflux 03/11/2022    Dr. Ames    Essential (primary) hypertension     Former smoker, stopped smoking in distant past     Gastritis 03/11/2022    Dr. Ames    Gastroesophageal reflux disease     Gout     Hallux limitus of right foot     Hemoptysis     High cholesterol     History of aortic valve stenosis     Hodgkin's disease in remission 1982    HODGKINS (35 Radiation treatments)    Hypersomnia     Hypothyroidism     hypothyroidism    Incisional hernia, incarcerated     Internal hemorrhoids 05/08/2023    Dr. Ames    Intervertebral cervical disc disorder with myelopathy, cervical region     Long term (current) use of anticoagulants     Mitral valve insufficiency     Non-rheumatic mitral regurgitation     Non-rheumatic tricuspid valve insufficiency     Pacemaker 07/00/2008    st.artem single chamber ([place]/[facility]); left chest    Plantar fasciitis     right foot    Pulmonary emphysema  (CMD)     Pulmonary hypertension  (CMD)     Sinus node dysfunction  (CMD)     Sleep apnea     BiPAP at night    SOB (shortness of breath) on exertion     has inhaler prn    Wears glasses 2018     Past Surgical History:   Procedure Laterality Date    Anes staging celiotomy-hodgkin's disease      RADIATION TREATMENT    Cardiac catherization  09/2012    RCA total occlusion, LAD 80%--> CABG    Cardiac catherization  02/23/2017    grafts patent    Cardiac surgery  2012    2 bypasses, Aortic Valve Replacement    Cataract extraction, bilateral Bilateral 09/2022    Colonoscopy w/ polypectomy  03/11/2022    dr ames    Colonoscopy w/ polypectomy  05/08/2023    dr ames    Echo heart resting  09/20/2012    LVEF 64%    Echo heart resting  10/25/2013    LVEF 60%    Echo heart resting  11/04/2014    LVEF 59%    Echo nargis  09/28/2012    No PFO. Aorta is normal caliber. There is trivial tricuspid regrugitaion. There is trivial pulmonic insufficiency. There is no mass in the left atrial appendage. Right ventricle is moderately dilated. The right and left ventricular systolic function is within normal limits. Right and left atrium are increased in size.    Echo nargis  04/26/2018    Echocardiogram  04/13/2009    LVEF 68%, moderate bi-atrial enlargement, mild aortic stenosis    Echocardiogram  03/27/2010    LVEF 72%, moderate bi-atrial enlargement, mild aortic stenosis, moderatetricuspid insufficiency    Echocardiogram  05/22/2012    LVEF 35-40%, mild to moderate LV systolic dysfunction with regional wall motion abnormality as above. Moderate mitral regurgitation. Moderate to severe calcific aortic stenosis    Ep pacer  07/2008    [facility] -single chamber    Esophageal motility study      Esophagogastroduodenoscopy  07/31/2017    dr ames    Esophagogastroduodenoscopy transoral flex w/bx single or mult  03/11/2022    dr ames    Extracapsular cataract removal w  insert io lens prosth wo ecp Bilateral     Fl myelogram 2 + regions spine  2015    Cervical & Lumbar spine    Inguinal hernia repair Bilateral     right and left inguinal, separate surgeries 1 year apart    Inguinal hernia repair Left     Nm myocardial perfusion rest or stress multi  10/25/2013    Positive for ST depresstion Asymptomatic    Pacer generator change  2017    Right heart cath  2018    Service to gastroenterology      colonoscopy    Splenectomy, total  1982    as part of the hodgkins's treatment    Tonsillectomy and adenoidectomy  age 30       Psychosocial History:  Social History     Socioeconomic History    Marital status: /Civil Union     Spouse name: Jessica    Number of children: 3    Years of education: Not on file    Highest education level: Not on file   Occupational History    Occupation: retired      Employer: [employer]     Comment: 2017   Tobacco Use    Smoking status: Former     Current packs/day: 0.00     Average packs/day: 2.0 packs/day for 35.0 years (70.0 ttl pk-yrs)     Types: Cigarettes     Start date: 1967     Quit date: 2002     Years since quittin.3     Passive exposure: Past    Smokeless tobacco: Never   Vaping Use    Vaping status: never used   Substance and Sexual Activity    Alcohol use: Not Currently     Comment: Occasionally     Drug use: No    Sexual activity: Yes     Partners: Female     Birth control/protection: None     Comment:    Other Topics Concern     Service Not Asked    Blood Transfusions Not Asked    Caffeine Concern Yes    Occupational Exposure Not Asked    Hobby Hazards Not Asked    Sleep Concern Not Asked    Stress Concern Not Asked    Weight Concern Not Asked    Special Diet Not Asked    Back Care Not Asked    Exercise Not Asked    Bike Helmet Not Asked    Seat Belt Not Asked    Self-Exams Not Asked   Social History Narrative    Lives with spouse, 1 cat     Social Determinants of Health      Financial Resource Strain: Low Risk  (10/1/2024)    Financial Resource Strain     Unable to Get: None   Food Insecurity: Low Risk  (10/1/2024)    Food Insecurity     Worried about Food: Never true     Food is Gone: Never true   Transportation Needs: Not At Risk (10/1/2024)    Transportation Needs     Lack of Reliable Transportation: No   Physical Activity: Not on file   Stress: Not on file   Social Connections: Low Risk  (10/1/2024)    Social Connections     Social Connectivity: 5 or more times a week   Interpersonal Safety: Low Risk  (10/1/2024)    Interpersonal Safety     How often physically hurt: Never     How often insulted or talked down to: Never     How often threatened with harm: Never     How often scream or curse at: Never       Family History:  Family History   Problem Relation Age of Onset    Hypertension Mother     Other Mother          of old age at age 87 yrs    Hypertension Father     Cancer Father         ? (abdomen)    Hypertension Sister     Diabetes Sister     Atrial Fibrilliation Sister     Osteoarthritis Sister     Sleep Apnea Son     Anxiety disorder Son        Medications:  Medications Prior to Admission   Medication Sig Dispense Refill    dilTIAZem (CARDIZEM CD) 180 MG 24 hr capsule Take 2 capsules by mouth daily. 180 capsule 3    potassium CHLORIDE (Klor-Con M) 20 MEQ betsy ER tablet Take 1 tablet by mouth daily. 540 tablet 3    spironolactone (ALDACTONE) 100 MG tablet Take 1 tablet by mouth daily. 90 tablet 3    metoPROLOL succinate (TOPROL-XL) 25 MG 24 hr tablet TAKE 1 AND 1/2 TABLET BY MOUTH DAILY 145 tablet 3    torsemide (DEMADEX) 20 MG tablet Take 2 tablets by mouth 2 times daily. Take two tablet two times a day with one 100mg tablet for a total dose of 140mg two times a day. 360 tablet 3    torsemide (DEMADEX) 100 MG tablet Take 1 tablet by mouth 2 times daily. Take one 100mg tablet along with two 20mg tablet two times a day for a total dose of 140mg two times a day. 180  tablet 3    warfarin (COUMADIN) 3 MG tablet 6 mg every Wednesday; 3 mg all other days 180 tablet 0    gabapentin (NEURONTIN) 300 MG capsule TAKE ONE CAPSULE BY MOUTH EVERY MORNING , 1 AT NOON,  3 AT BEDTIME 450 capsule 1    pantoprazole (PROTONIX) 40 MG tablet Take 1 tablet by mouth in the morning and 1 tablet in the evening. 180 tablet 3    levothyroxine 125 MCG tablet TAKE 1 TABLET BY MOUTH DAILY 90 tablet 3    rosuvastatin (CRESTOR) 5 MG tablet Take 1 tablet by mouth daily. 90 tablet 3    allopurinol (ZYLOPRIM) 100 MG tablet Take 1 tablet by mouth in the morning and 1 tablet in the evening. 180 tablet 3    baclofen (LIORESAL) 10 MG tablet Take 1 tablet by mouth nightly. 90 tablet 3    polyethylene glycol (MIRALAX) 17 g packet Take 17 g by mouth in the morning and 17 g in the evening. Stir and dissolve powder in any 4 to 8 ounces of beverage, then drink.      sildenafil (VIAGRA) 100 MG tablet TAKE ONE TABLET BY MOUTH DAILY AS NEEDED FOR ERECTILE DYSFUNCTION 30 tablet 0    Nutritional Supplements (VITAMIN D BOOSTER PO) Take 1 tablet by mouth daily.       Ascorbic Acid (VITAMIN C) 1000 MG tablet Take 1,000 mg by mouth daily.       aspirin 81 MG chewable tablet Chew 1 tablet by mouth daily. 30 tablet 2    acetaminophen (TYLENOL) 650 MG CR tablet Take 650-1,300 mg by mouth every 8 hours as needed.         Allergies:    ALLERGIES:   Allergen Reactions    Metolazone RASH     Needed prednisone       Review of systems:    Constitutional:  no weight change or fever  Eyes:  no vision loss or pain  Ears, Nose, Mouth, Throat:  Ears:   no hearing loss or ringing  Cardiovascular:   no chest pain or palpitations  Respiratory:  no shortness of breath or cough  Gastrointestinal:  no abdominal pain or change in bowel habits  Genitourinary:    Urinary:  no flank pain or urinary difficulty.  Musculoskeletal:  no joint pain or weakness  Integumentary (skin and/or breast):   Skin:  no rashes or skin changes  Neurologic: As  above  Psychiatric: no anxiety or depression   Endocrine:   Hematologic/Lymphatic:  no  enlarged lymph nodes  Allergic/Immunologic:  normal      Physical Exam:  Visit Vitals  /58   Pulse 93   Temp 97.8 °F (36.6 °C) (Oral)   Resp (!) 21   Ht 6' 1\" (1.854 m)   Wt 86.1 kg (189 lb 13.1 oz)   SpO2 98%   BMI 25.04 kg/m²      Constitutional: General appearance:  alert, cooperative, and in no distress  Ears, nose, mouth, and throat:   Ears:  external ears normal  Oropharynx: lips, mucosa, and tongue normal.  Cardiovascular:   Heart: regular rate and rhythm, S1, S2, no murmurs/rubs/gallops, PMI midline  Peripheral vascular: bilateral carotid, radial, femoral, DP and PT pulses are normal.  Respiratory:    chest symmetric, lungs clear bilaterally and no crackles, wheezes or rales  Gastrointestinal:  Abdominal:   normal active bowel sounds, no hepatosplenomegaly, no tenderness, no bruits and no pulsatile mass.  Musculoskeletal:  no cyanosis, clubbing or edema  Skin:  normal skin color, texture, and turgor.  No rashes or lesions.  Ecchymosis left upper extremity  Neurological:    Normal strength, reflexes symmetric, sensation intact, coordination normal, cranial nerves II-XII normal  Heme/lymph/immunologic:    no enlargement of cervical, supraclavicular, axillary or inguinal lymph nodes.  Psychiatric:  alert, oriented, cooperative, normal affect.    Labs:   CBC  WBC (K/mcL)   Date Value   10/04/2024 11.9 (H)      RBC (mil/mcL)   Date Value   10/04/2024 3.42 (L)      HCT (%)   Date Value   10/04/2024 34.3 (L)      HGB (g/dL)   Date Value   10/04/2024 11.3 (L)      PLT (K/mcL)   Date Value   10/04/2024 263        BMP  Sodium (mmol/L)   Date Value   10/04/2024 138      Potassium (mmol/L)   Date Value   10/04/2024 3.7      Chloride (mmol/L)   Date Value   10/04/2024 102      Glucose (mg/dL)   Date Value   10/04/2024 104 (H)      Calcium (mg/dL)   Date Value   10/04/2024 9.4      Carbon Dioxide (mmol/L)   Date Value    10/04/2024 31      BUN (mg/dL)   Date Value   10/04/2024 70 (H)      Creatinine (mg/dL)   Date Value   10/04/2024 2.04 (H)        Other Diagnostic Studies:   MRI THORACIC SPINE WO CONTRAST   Final Result   IMPRESSION:      *  Mild superior endplate compression fracture at T1 with associated marrow   edema but no significant retropulsion or posttraumatic spinal canal   stenosis.   *  Age-related multilevel degenerative change throughout the thoracic   spine, notable for areas of mild and moderate multilevel degenerative   neural foraminal narrowing without significant spinal canal stenosis.   *  Normal appearance of the thoracic spinal cord.         Electronically Signed by: Marbin Canela DO   Signed on: 10/3/2024 5:45 PM   Created on Workstation ID: [id number]   Signed on Workstation ID: [id number]      MRA NECK WO CONTRAST   Final Result   IMPRESSION:      MRA head:    1.  Patent intracranial vasculature.   2.  1.5 mm conical outpouching of the proximal cavernous left ICA   indicative of infundibulum versus less likely extradural aneurysm.   3.  No intradural aneurysm identified to account for subarachnoid   hemorrhage.      MRA neck:    1.  Patent extracranial vasculature without evidence for occlusion or near   occlusion.   2.  Atherosclerotic irregularity at the carotid bifurcations with up to 40%   stenosis of the right ICA and less than 30% stenosis of the left ICA.         Electronically Signed by: Marbin Canela DO   Signed on: 10/3/2024 5:36 PM   Created on Workstation ID: [id number]   Signed on Workstation ID: [id number]      MRA HEAD WO CONTRAST   Final Result   IMPRESSION:      MRA head:    1.  Patent intracranial vasculature.   2.  1.5 mm conical outpouching of the proximal cavernous left ICA   indicative of infundibulum versus less likely extradural aneurysm.   3.  No intradural aneurysm identified to account for subarachnoid   hemorrhage.      MRA neck:    1.  Patent extracranial vasculature without  evidence for occlusion or near   occlusion.   2.  Atherosclerotic irregularity at the carotid bifurcations with up to 40%   stenosis of the right ICA and less than 30% stenosis of the left ICA.         Electronically Signed by: Marbin Canela DO   Signed on: 10/3/2024 5:36 PM   Created on Workstation ID: [id number]   Signed on Workstation ID: [id number]      CT CERVICAL SPINE WO CONTRAST   Final Result   IMPRESSION:        1. T1 superior endplate mild anterior wedge compression. This is a new   finding. No fracture elsewhere.      2. Multilevel degenerative changes. Multilevel mild canal narrowing. Mild,   moderate, and/or severe foraminal stenosis throughout the cervical spine,   described in detail in the body of the report.         Electronically Signed by: Ghassan Cai MD   Signed on: 10/1/2024 10:39 PM   Created on Workstation ID: [id number]   Signed on Workstation ID: [id number]      CT HEAD WO CONTRAST   Final Result   IMPRESSION:   Unchanged subarachnoid hemorrhage. There is no new or progressive   hemorrhage.         Electronically Signed by: Ghassan Cai MD   Signed on: 10/1/2024 10:33 PM   Created on Workstation ID: [id number]   Signed on Workstation ID: [id number]      CT HEAD WO CONTRAST   Final Result   IMPRESSION:    Moderate bilateral areas of subarachnoid hemorrhage, left greater than   right. Findings are most pronounced in the frontal lobes bilaterally.      I called results to Dr. DARIO HENSLEY on  10/1/2024 4:09 PM.            Electronically Signed by: Mallory Olivarez DO   Signed on: 10/1/2024 4:12 PM   Created on Workstation ID: [id number]   Signed on Workstation ID: [id number]      XR CHEST PA OR AP 1 VIEW   Final Result   IMPRESSION:       No acute cardiopulmonary findings.      I, Attending Radiologist Mallory Olivarez DO, have reviewed the images and   report and concur with these findings interpreted by Resident Radiologist,   Stephen Fall DO.      Electronically Signed by: Mallory Olivarez DO    Signed on: 10/1/2024 3:10 PM   Created on Workstation ID: [id number]   Signed on Workstation ID: [id number]      XR KNEE 3 VIEWS RIGHT   Final Result   IMPRESSION:      No acute fracture or dislocation. No sizable knee joint effusion. Vascular   calcifications. The joint spaces are preserved. Metallic hyperdensities are   present along the medial right lower leg.      I, Attending Radiologist Mallory Olivarez DO, have reviewed the images and   report and concur with these findings interpreted by Resident Radiologist,   Stephen Fall DO.      Electronically Signed by: Mallory Olivarez DO   Signed on: 10/1/2024 3:17 PM   Created on Workstation ID: [id number]   Signed on Workstation ID: [id number]      XR FOOT 3 OR MORE VIEWS RIGHT   Final Result   IMPRESSION:      No acute fracture or dislocation. Severe hypertrophic changes at the first   proximal interphalangeal joint with mild hallux valgus deformity. Vascular   calcifications. Plantar fascial enthesopathy. The remaining joint spaces   are preserved.      I, Attending Radiologist Mallory Olivarez DO, have reviewed the images and   report and concur with these findings interpreted by Resident Radiologist,   Stephen Fall DO.      Electronically Signed by: Mallory Olivarez DO   Signed on: 10/1/2024 3:19 PM   Created on Workstation ID: [id number]   Signed on Workstation ID: [id number]          Assessment /Recommendations    Traumatic subarachnoid hemorrhage    Holding anticoagulation.  Resume when cleared by neurosurgery.  Follow-up MRI unremarkable for any large aneurysms.  Transferred to telemetry.  Therapy follow-up.  Manage headaches  Blood pressure control    Mechanical aortic valve    Heparin drip when cleared by neurosurgery.  Transthoracic echocardiogram 9/18/2024:    Result Text   Final Impressions    * Normal left ventricular size and systolic function, EF 56 %.    * Moderately increased right ventricular chamber size with normal systolic  function.   TAPSE 20 mm.     * S/p AVR 19 mm [facility]em [place] Valve (09/28/12).  Aortic valve mean  gradient 10 mmHg.  Trivial aortic valve regurgitation.    * Tricuspid valve annular dilatation. Moderate tricuspid valve  regurgitation.    * Severe mitral annular calcification. Mean gradient 5 mm Hg at 63 bpm. Mild  mitral regurgitation.    * Moderate pulmonary hypertension; RVSP 52 mmHg.    * Flattening of the septum in systole consistent with right ventricular  pressure overload.    * No pericardial effusion.    * Compared to the study the focused right heart TTE from 05/23/24 there is  no significant change.             Acute on chronic renal insufficiency     Likely prerenal.  Secondary to orthostatic hypotension.  Improved with IV hydration.  Nephrology service follow-up    Coronary artery disease    Risk factor modification.  Transthoracic echocardiogram as above  Antiplatelet agents when cleared by neurosurgery.    T1 compression fracture    Asymptomatic.  MRI noted.  Given absence of pain, no benefit of kyphoplasty    Thank you for the kind consultation   good

## 2024-10-31 NOTE — ED ADULT NURSE NOTE - NEURO MENTATION
How Severe Are They?: mild Is This A New Presentation, Or A Follow-Up?: Skin Lesions Additional History: Skin tags for removal confused/at times

## 2025-04-29 NOTE — PHYSICAL THERAPY INITIAL EVALUATION ADULT - MANUAL MUSCLE TESTING RESULTS, REHAB EVAL
What Type Of Note Output Would You Prefer (Optional)?: Standard Output What Is The Reason For Today's Visit?: Full Body Skin Examination with No Concerns What Is The Reason For Today's Visit? (Being Monitored For X): the development of new lesions 4/5 throughout/grossly assessed due to